# Patient Record
Sex: FEMALE | Race: WHITE
[De-identification: names, ages, dates, MRNs, and addresses within clinical notes are randomized per-mention and may not be internally consistent; named-entity substitution may affect disease eponyms.]

---

## 2017-03-27 ENCOUNTER — HOSPITAL ENCOUNTER (INPATIENT)
Dept: HOSPITAL 74 - FER | Age: 62
LOS: 10 days | Discharge: HOME | DRG: 872 | End: 2017-04-06
Attending: NURSE PRACTITIONER | Admitting: INTERNAL MEDICINE
Payer: COMMERCIAL

## 2017-03-27 VITALS — BODY MASS INDEX: 26.2 KG/M2

## 2017-03-27 DIAGNOSIS — R63.1: ICD-10-CM

## 2017-03-27 DIAGNOSIS — E86.0: ICD-10-CM

## 2017-03-27 DIAGNOSIS — G43.809: ICD-10-CM

## 2017-03-27 DIAGNOSIS — E83.39: ICD-10-CM

## 2017-03-27 DIAGNOSIS — D69.6: ICD-10-CM

## 2017-03-27 DIAGNOSIS — E11.65: ICD-10-CM

## 2017-03-27 DIAGNOSIS — R65.20: ICD-10-CM

## 2017-03-27 DIAGNOSIS — B96.29: ICD-10-CM

## 2017-03-27 DIAGNOSIS — N39.0: ICD-10-CM

## 2017-03-27 DIAGNOSIS — J40: ICD-10-CM

## 2017-03-27 DIAGNOSIS — A41.59: Primary | ICD-10-CM

## 2017-03-27 DIAGNOSIS — E83.42: ICD-10-CM

## 2017-03-27 DIAGNOSIS — R35.8: ICD-10-CM

## 2017-03-27 DIAGNOSIS — K59.09: ICD-10-CM

## 2017-03-27 DIAGNOSIS — C20: ICD-10-CM

## 2017-03-27 DIAGNOSIS — N17.9: ICD-10-CM

## 2017-03-27 DIAGNOSIS — E87.1: ICD-10-CM

## 2017-03-27 DIAGNOSIS — K21.9: ICD-10-CM

## 2017-03-27 LAB
ALBUMIN SERPL-MCNC: 2.7 G/DL (ref 3.5–5)
ALP SERPL-CCNC: 168 U/L (ref 32–92)
ALT SERPL-CCNC: 28 U/L (ref 10–40)
ANION GAP SERPL CALC-SCNC: 10 MMOL/L (ref 8–16)
AST SERPL-CCNC: 53 U/L (ref 10–42)
BACTERIA #/AREA URNS HPF: (no result) /HPF
BILIRUB SERPL-MCNC: 0.8 MG/DL (ref 0.2–1)
CALCIUM SERPL-MCNC: 8.7 MG/DL (ref 8.4–10.2)
CK SERPL-CCNC: 19 IU/L (ref 26–140)
CO2 SERPL-SCNC: 18 MMOL/L (ref 22–28)
COLOR UR: YELLOW
CREAT SERPL-MCNC: 2.3 MG/DL (ref 0.6–1.3)
DEPRECATED RDW RBC AUTO: 14.5 % (ref 11.6–15.6)
GLUCOSE SERPL-MCNC: 270 MG/DL (ref 74–106)
LEUKOCYTE ESTERASE UR QL STRIP.AUTO: (no result)
MAGNESIUM SERPL-MCNC: 1.8 MG/DL (ref 1.8–2.4)
MCH RBC QN AUTO: 30.4 PG (ref 25.7–33.7)
MCHC RBC AUTO-ENTMCNC: 33.5 G/DL (ref 32–36)
MCV RBC: 90.6 FL (ref 80–96)
NEUTROPHILS # BLD: 77 % (ref 42.8–82.8)
PH UR: 7 [PH] (ref 4.5–8)
PHOSPHATE SERPL-MCNC: 2.9 MG/DL (ref 2.5–4.6)
PLATELET # BLD AUTO: 42 K/MM3 (ref 134–434)
PLATELET # BLD EST: (no result) 10*3/UL
PLATELET COMMENT2: (no result)
PLATELET COMMENT3: (no result)
PMV BLD: 9.4 FL (ref 7.5–11.1)
PROT SERPL-MCNC: 6.5 G/DL (ref 6.4–8.3)
PROT UR QL STRIP: (no result)
RBC # BLD AUTO: (no result) /HPF (ref 0–3)
RBC # UR STRIP: (no result) /UL
SP GR UR: 1.01 (ref 1–1.02)
TROPONIN I SERPL-MCNC: < 0.03 NG/ML (ref 0.03–0.5)
UROBILINOGEN UR STRIP-MCNC: (no result) MG/DL (ref 0.2–1)
WBC # BLD AUTO: 17.6 K/MM3 (ref 4–10)
WBC # UR AUTO: (no result) /UL (ref 3–5)

## 2017-03-27 RX ADMIN — POLYETHYLENE GLYCOL 3350 SCH GRAMS: 17 POWDER, FOR SOLUTION ORAL at 23:16

## 2017-03-27 NOTE — HP
CHIEF COMPLAINT: polydipsia, polyuria, fevers





PCP: Dr. Mora





HISTORY OF PRESENT ILLNESS:


62 y/o F w/PMH of GERD, prediabetes, rectal carcinoid ca, migraines presents to 

ER w/ c/o polydipsia, polyuria, intermittent fevers for last 1 month.  She also 

c/o intermittent chills, non-productive cough from allergies, and 

intermittently has night sweats.  She saw her PCP one week ago who hydrated her 

with 2 L NS in the office. She also c/o some heaviness in breathing and 

weakness today.  She denies any sick contacts, any recent travel. She denies CP

, SOB, abd pain, N/V, diarrhea.  She was diagnosed with pre-diabetes last 

summer and was trying to work on it with lifestyle modifications.  She had 

recently seen neuro and diagnosed with brachial plexus inflammation and was 

started on prednisone 10mg bid for 7 days. Today is day 4/7 of prednisone.   

She has also c/o darker urine and change in smell of urine over last few days.  

She denies any dysuria.  She has not had a normal BM in 1 week, she feels 

constipated.





Sprinolactone listed as home med but she uses lasix instead but is unsure of 

dose.








ER course was notable for:


(1) vanc, zosyn, levaquin, cxr


(2)


(3)





Recent Travel: denies





PAST MEDICAL HISTORY: GERD, prediabetes, rectal carcinoid ca, migraines





PAST SURGICAL HISTORY: knee surgery





Social History:


Smoking: denies


Alcohol: denies


Drugs: denies





Family History: Mother: CLL.   Father: "heart issues" but currently alive at 

age 95


Allergies





everolimus [From Afinitor] Adverse Reaction (Severe, Verified 03/27/17 11:58)


 SOB, Myalgias, Arthralgias,Edema


erythromycin base [Erythromycin Base] Adverse Reaction (Verified 03/27/17 11:58)


 


 NAUSEA 








HOME MEDICATIONS:


 Home Medications











 Medication  Instructions  Recorded


 


  


 


Montelukast Na [Singulair -] 10 mg PO HS 03/27/17


 


Octreotide Acetate,Mi-Spheres 30 mg IM MONTHLY 03/27/17





[Sandostatin Lar Depot]  


 


Prednisone [Deltasone] 20 mg PO DAILY 03/27/17


 


Topiramate [Topamax] 50 mg PO DAILY 03/27/17








REVIEW OF SYSTEMS


CONSTITUTIONAL: 


fever, chills, night sweats, weakness


Absent: malaise, loss of appetite, weight change


HEENT: 


Absent:  rhinorrhea, nasal congestion, throat pain, throat swelling, difficulty 

swallowing, mouth swelling, ear pain, eye pain, visual changes


CARDIOVASCULAR: 


Absent: chest pain, syncope, palpitations, irregular heart rate, lightheadedness

, peripheral edema


RESPIRATORY: 


cough


Absent: shortness of breath, dyspnea with exertion, orthopnea, wheezing, stridor

, hemoptysis


GASTROINTESTINAL:


constipation


Absent: abdominal pain, abdominal distension, nausea, vomiting, diarrhea, melena

, hematochezia


GENITOURINARY: 


Absent: dysuria, frequency, urgency, hesitancy, hematuria, flank pain, genital 

pain


MUSCULOSKELETAL: 


Absent: myalgia, arthralgia, joint swelling, back pain, neck pain


SKIN: 


Absent: rash, itching, pallor


HEMATOLOGIC/IMMUNOLOGIC: 


Absent: easy bleeding, easy bruising, lymphadenopathy, frequent infections


ENDOCRINE:


Absent: unexplained weight gain, unexplained weight loss, heat intolerance, 

cold intolerance


NEUROLOGIC: 


Absent: headache, focal weakness or paresthesias, dizziness, unsteady gait, 

seizure, mental status changes, bladder or bowel incontinence


PSYCHIATRIC: 


Absent: anxiety, depression, suicidal or homicidal ideation, hallucinations.








PHYSICAL EXAMINATION


 Vital Signs - 24 hr











  03/27/17 03/27/17 03/27/17





  15:45 15:50 16:24


 


Temperature 100.8 F H 101.8 F H 


 


Pulse Rate [  108 H 142 H





Apical]   


 


Respiratory  30 H 34 H





Rate   


 


Blood Pressure  139/71 151/89





[Right Arm]   


 


O2 Sat by Pulse  96 99





Oximetry (%)   














  03/27/17 03/27/17





  16:55 18:16


 


Temperature 102.1 F H 102.7 F H


 


Pulse Rate [ 134 H 





Apical]  


 


Respiratory 20 





Rate  


 


Blood Pressure  





[Right Arm]  


 


O2 Sat by Pulse 99 





Oximetry (%)  











GENERAL: Awake, alert, and fully oriented, in no acute distress.


HEAD: Normal with no signs of trauma.


EYES: extraocular movements intact, sclera anicteric, conjunctiva clear. No lid 

lag.


EARS, NOSE, THROAT: Ears normal, nares patent, Moist mucous membranes.


NECK: Normal range of motion, supple without lymphadenopathy, JVD, or masses.


LUNGS: Breath sounds equal, clear to auscultation bilaterally. No wheezes, and 

no crackles. No accessory muscle use.


HEART: Tachycardic, normal S1 and S2 without murmur, rub or gallop.


ABDOMEN: Soft, mild LLQ tenderness, not distended, normoactive bowel sounds, no 

guarding, no rebound, no masses.  No hepatomegaly or  splenomegaly. 


MUSCULOSKELETAL: No CVA tenderness.


LOWER EXTREMITIES: 2+ pulses, warm, well-perfused. No calf tenderness. Trace 

pitting edema.


NEUROLOGICAL: Normal speech. Gait not observed.


PSYCHIATRIC: Cooperative. Good eye contact. Appropriate mood and affect.


SKIN: Warm, dry, normal turgor, no rashes or lesions noted, normal capillary 

refill. 








 Laboratory Results - last 24 hr








 CBCD











WBC  17.6 K/mm3 (4.0-10.0)  H D 03/27/17  12:30    


 


RBC  3.60 M/mm3 (3.60-5.2)   03/27/17  12:30    


 


Hgb  10.9 GM/dl (10.7-15.3)  D 03/27/17  12:30    


 


Hct  32.6 % (32.4-45.2)   03/27/17  12:30    


 


MCV  90.6 fl (80-96)   03/27/17  12:30    


 


MCHC  33.5 g/dl (32.0-36.0)   03/27/17  12:30    


 


RDW  14.5 % (11.6-15.6)  D 03/27/17  12:30    


 


Plt Count  42 K/MM3 (134-434)  L* D 03/27/17  12:30    


 


MPV  9.4 fl (7.5-11.1)  D 03/27/17  12:30    








 CMP











Sodium  128 mmol/L (136-145)  L D 03/27/17  12:30    


 


Potassium  4.2 mmol/L (3.5-5.1)   03/27/17  12:30    


 


Chloride  100 mmol/L ()  D 03/27/17  12:30    


 


Carbon Dioxide  18 mmol/L (22-28)  L D 03/27/17  12:30    


 


Anion Gap  10  (8-16)   03/27/17  12:30    


 


BUN  53 mg/dl (7-18)  H D 03/27/17  12:30    


 


Creatinine  2.3 mg/dl (0.6-1.3)  H D 03/27/17  12:30    


 


Creat Clearance w eGFR  21.56  (>60)   03/27/17  12:30    


 


Random Glucose  270 mg/dl ()  H D 03/27/17  12:30    


 


Calcium  8.7 mg/dl (8.4-10.2)   03/27/17  12:30    


 


Total Bilirubin  0.8 mg/dl (0.2-1.0)  D 03/27/17  12:30    


 


AST  53 U/L (10-42)  H D 03/27/17  12:30    


 


ALT  28 U/L (10-40)  D 03/27/17  12:30    


 


Alkaline Phosphatase  168 U/L (32-92)  H D 03/27/17  12:30    


 


Total Protein  6.5 g/dl (6.4-8.3)   03/27/17  12:30    


 


Albumin  2.7 g/dl (3.5-5.0)  L D 03/27/17  12:30    








 CARDIAC ENZYMES











Creatine Kinase  19 IU/L ()  L  03/27/17  12:30    


 


Troponin I  < 0.03 ng/ml (0.03-0.50)  L  03/27/17  12:30    




















  03/27/17





  16:10


 


Lactic Acid  5.594 H*








 Laboratory Tests











  03/27/17





  21:00


 


Lactic Acid  2.093 H*








 Urine Test Results











Urine Color  Yellow   03/27/17  12:05    


 


Urine Appearance  Slightly   03/27/17  12:05    


 


Urine pH  7.0  (4.5-8)   03/27/17  12:05    


 


Ur Specific Gravity  1.010  (1.005-1.025)   03/27/17  12:05    


 


Urine Protein  1+  (NEGATIVE)  H  03/27/17  12:05    


 


Urine Glucose (UA)  Negative  (NEGATIVE)   03/27/17  12:05    


 


Urine Ketones  Negative  (NEGATIVE)   03/27/17  12:05    


 


Urine Blood  3+  (NEGATIVE)  H  03/27/17  12:05    


 


Urine Nitrite  Negative  (NEGATIVE)   03/27/17  12:05    


 


Urine Bilirubin  Negative  (NEGATIVE)   03/27/17  12:05    


 


Ur Leukocyte Esterase  3+  (NEGATIVE)  H  03/27/17  12:05    


 


Urine RBC  5-10 /hpf (0-3)   03/27/17  12:05    


 


Urine WBC  30-50  (3-5)   03/27/17  12:05    


 


Ur Epithelial Cells  3-5 /HPF  03/27/17  12:05    


 


Urine Bacteria  Moderate /hpf (NEGATIVE)   03/27/17  12:05    











Imaging:


CXR: no evidence of active pulm disease








ASSESSMENT/PLAN:


62 y/o F w/PMH of rectal ca, GERD, migraines, pre-diabetes presented to ER w/ c/

o polydipsia, polyuria, and intermittent fevers for 4 weeks. Admitted for 

severe sepsis secondary to UTI.





-Severe sepsis secondary to UTI


   -SIRS 4/4, UA 3+ LE


   -c/w zosyn, ID consulted (Bobde)


   -NS @ 100 ml/hr, bolused intially with 250ml/hr x 2 L after being brought up 

to floors.


   -trend LA,  5.594 -> 2


   -f/u BCx, UCx


   -Tylenol 650 mg PO q6h PRN for fever, monitor LFTs


   -part of leukocytosis may be from recent prednisone use





-ADAM secondary to severe sepsis


   -BUN/Cr 53/2.3, baseline Cr 0.8


   -NS @ 125 ml/hr


   -monitor BUN/Cr


   -urine lytes to calculate FeNa





-Thrombocytopenia secondary to severe sepsis


   -monitor, no signs of active bleeding at this time





-Abd pain secondary to constipation


   -Miralax, colace





-Hyperglycemia, polydipsia, polyuria


   -f/u A1C, BGM, ISS BIDAC


   -recent prednisone use





-Brachial plexus inflammation


   -today is day 4/7 of prednisone, will taper off prednisone


   -20 mg today, 10 tomorrow, 10 the day after for final dose.





-Rectal Ca, carcinoid


   -octreotide monthly, follows up with specialist as outpatient





-migraines


   -c/w topamax





-DVT ppx


   -SCDs





-FEN


   -NS @ 100 ml/hr


   -Hyponatremia - c/w NS @ 100 ml/hr, monitor, do not increase by more than 10 

in 24 hours


   -Diabetic diet





-Dispo:


   -Admit to m/s





Problem List





- Problem


(1) Acute kidney injury


Code(s): N17.9 - ACUTE KIDNEY FAILURE, UNSPECIFIED





(2) Diabetes mellitus, new onset


Code(s): E11.9 - TYPE 2 DIABETES MELLITUS WITHOUT COMPLICATIONS





(3) Sepsis


Code(s): A41.9 - SEPSIS, UNSPECIFIED ORGANISM   





(4) UTI (urinary tract infection)


Code(s): N39.0 - URINARY TRACT INFECTION, SITE NOT SPECIFIED   





(5) Migraines


Code(s): G43.909 - MIGRAINE, UNSP, NOT INTRACTABLE, WITHOUT STATUS MIGRAINOSUS 

  








Visit type





- Emergency Visit


Emergency Visit: Yes


ED Registration Date: 03/27/17


Care time: The patient presented to the Emergency Department on the above date 

and was hospitalized for further evaluation of their emergent condition.





- New Patient


This patient is new to me today: Yes


Date on this admission: 03/28/17





- Critical Care


Critical Care patient: No

## 2017-03-27 NOTE — PN
<Humble Shirleybrandee - Last Filed: 03/27/17 19:22>





Teaching Attending Note


Name of Resident: Cornelio Kaur





ATTENDING PHYSICIAN STATEMENT





I saw and evaluated the patient.


I reviewed the resident's note and discussed the case with the resident.


I agree with the resident's findings and plan as documented.








SUBJECTIVE:








OBJECTIVE:








ASSESSMENT AND PLAN:








<Js Moraleske - Last Filed: 03/27/17 21:36>





Teaching Attending Note


ATTENDING PHYSICIAN STATEMENT





I saw and evaluated the patient.


I reviewed the resident's note and discussed the case with the resident.


I agree with the resident's findings and plan as documented.





SUBJECTIVE:


61 year old female with a past medical history of rectal cancer, gerd, pre-

diabetes, and migraines  presents with excessive thirst and urination for four 

weeks. The patient noticed associated night sweats and intermittent fevers. 

Patient stated that she recently started a regimen of prednisone for a 

bacterial plexus inflammation. Patient denies shortness of breath and chest pain





OBJECTIVE:


Vital Signs:


 Last Vital Signs











Temp Pulse Resp BP Pulse Ox


 


 102.7 F H  134 H  20   151/89   99 


 


 03/27/17 18:16  03/27/17 16:55  03/27/17 16:55  03/27/17 16:24  03/27/17 16:55











Physical Exam:


GEN: NAD 


HEENT: NCAT, PERRL


CARD: RRR, S1 S2


RESP: CTAB


ABD: NT, BWS x4


EXT: - CCE





Labs:


 CBCD











WBC  17.6 K/mm3 (4.0-10.0)  H D 03/27/17  12:30    


 


RBC  3.60 M/mm3 (3.60-5.2)   03/27/17  12:30    


 


Hgb  10.9 GM/dl (10.7-15.3)  D 03/27/17  12:30    


 


Hct  32.6 % (32.4-45.2)   03/27/17  12:30    


 


MCV  90.6 fl (80-96)   03/27/17  12:30    


 


MCHC  33.5 g/dl (32.0-36.0)   03/27/17  12:30    


 


RDW  14.5 % (11.6-15.6)  D 03/27/17  12:30    


 


Plt Count  42 K/MM3 (134-434)  L* D 03/27/17  12:30    


 


MPV  9.4 fl (7.5-11.1)  D 03/27/17  12:30    








 CMP











Sodium  128 mmol/L (136-145)  L D 03/27/17  12:30    


 


Potassium  4.2 mmol/L (3.5-5.1)   03/27/17  12:30    


 


Chloride  100 mmol/L ()  D 03/27/17  12:30    


 


Carbon Dioxide  18 mmol/L (22-28)  L D 03/27/17  12:30    


 


Anion Gap  10  (8-16)   03/27/17  12:30    


 


BUN  53 mg/dl (7-18)  H D 03/27/17  12:30    


 


Creatinine  2.3 mg/dl (0.6-1.3)  H D 03/27/17  12:30    


 


Creat Clearance w eGFR  21.56  (>60)   03/27/17  12:30    


 


Calcium  8.7 mg/dl (8.4-10.2)   03/27/17  12:30    


 


Total Bilirubin  0.8 mg/dl (0.2-1.0)  D 03/27/17  12:30    


 


AST  53 U/L (10-42)  H D 03/27/17  12:30    


 


ALT  28 U/L (10-40)  D 03/27/17  12:30    


 


Alkaline Phosphatase  168 U/L (32-92)  H D 03/27/17  12:30    


 


Total Protein  6.5 g/dl (6.4-8.3)   03/27/17  12:30    


 


Albumin  2.7 g/dl (3.5-5.0)  L D 03/27/17  12:30    











Imaging:


EXAM#: CHEST X-RAY 


History: Cough and fever. 


Comparison study: May 13, 2015 


Findings: Patient slightly rotated toward the left side. The trachea is normal 

in size. Unremarkable contour of the cardiomediastinal silhouette. The lungs 

are well aerated. No evidence of airspace opacities, atelectasis,  pleural 

effusion, or pneumothorax. No bulky hilar adenopathy is noted. No lung mass is 

seen within the limitation of examination Demineralized osseous structures. 

Mild dextroscoliosis is seen at  the thoracolumbar junction region. Loss of 

vertical height of T12 unchanged from 2015. Intact visualized osseous rectal 

cancer, gerd, pre-diabetes, and migraine structures Impression: No evidence of 

active pulmonary disease. Reported By: Willian Nguyen MD 03/27/17 [id number]    





ASSESSMENT AND PLAN:


61 year old female with a past medical history of rectal cancer, gerd, pre-

diabetes, and migraines who presents with polyuria, polydipsia, and 

intermittent fever, being admitted for sepsis secondary to UTI





1. Sepsis- secondary to UTI


-Continue Zosyn


-Blood Cultures 


-Urine Cultures


-Repeat Lactic Acid


-IVF


-ID consult


-Acetaminophen PRN fever 





2. Acute renal failure


-Urine lytes


-Trend creatinine 


-IVF





3. Hiponatremia-Most likely due to hypovolemia


-Avoid inc sodium >10 in 24 hr





4. Thrombocytopenia- most likely due to sepsis


-Trend 





5. Rectal cancer


-Carcinoid 


-On Octreotide monthly 





6. Migraine


-Continue Topamax 





7. Polyuria/Polydipsia  


Check A1C


Insulin Sliding scale 


Possibly due to prednisone use.





8 Brachial Plexus Inflammation 


-On day 4 out 7 of prednisone 20 mg 


-Since short term course will hold off on stress course steroids





9. DVT PPX -low risk


-SCDs





Admit to med surg. 


Documentation prepared by Alejandro Morales, acting as medical scribe for Dr. Greer Shirley MD.

## 2017-03-27 NOTE — PDOC
History of Present Illness





- General


Chief Complaint: Respiratory


Stated Complaint: FLU LIKE ILLNESS X 4 WEEKS


Time Seen by Provider: 03/27/17 12:06


History Source: Patient, Old Records


Exam Limitations: No Limitations





- History of Present Illness


Initial Comments: 


03/27/17 12:10


62 y/o female with h/o rectal CA, GERD and migraine headaches presents to the 

ED with c/o excessive thirst and urination and feeling dehydrated.  The patient 

states that these symptoms have been present for about 4 weeks.  She has also 

had intermittent fevers, chills and coughing (non-productive), as well as night 

sweats.  She denies chest pain, SOB, abdominal pain, nausea, vomiting and 

diarrhea.  She has no prior h/o DM but says that she was told that she has "pre-

diabetes."  The patient states that she recently started a regimen of 

prednisone for a brachial plexus inflammation.








Past History





- Past Medical History


Allergies/Adverse Reactions: 


 Allergies











Allergy/AdvReac Type Severity Reaction Status Date / Time


 


everolimus [From Afinitor] AdvReac Severe SOB, Verified 03/27/17 11:58





   Myalgias,  





   Arthralgias,Edema  


 


erythromycin base AdvReac   Verified 03/27/17 11:58





[Erythromycin Base]     











Home Medications: 


Ambulatory Orders





Spironolactone [Aldactone] 25 mg PO ASDIR PRN 05/13/15 


Montelukast Na [Singulair -] 10 mg PO HS 03/27/17 


Octreotide Acetate,Mi-Spheres [Sandostatin Lar Depot] 30 mg IM MONTHLY 03/27/17 


Prednisone [Deltasone] 20 mg PO DAILY 03/27/17 


Topiramate [Topamax] 50 mg PO DAILY 03/27/17 








Cancer: Yes (RECTAL)


GI Disorders: Yes (GERD)





- Psycho/Social/Smoking Cessation Hx


Anxiety: No


Suicidal Ideation: No


Smoking History: Never smoked


Have you smoked in the past 12 months: No


Hx Alcohol Use: Yes (SOCIAL)


Drug/Substance Use Hx: No


Substance Use Type: None





**Review of Systems





- Review of Systems


Able to Perform ROS?: Yes


Is the patient limited English proficient: No


Constitutional: Yes: See HPI


HEENTM: Yes: See HPI


Respiratory: Yes: See HPI


Cardiac (ROS): No: Symptoms Reported


ABD/GI: No: Symptoms Reported


: Yes: See HPI, Frequency.  No: Burning, Dysuria, Discharge, Hematuria


Musculoskeletal: Yes: Joint Pain


Integumentary: No: Symptoms Reported


Neurological: No: Symptoms reported





*Physical Exam





- Vital Signs


 Last Vital Signs











Temp Pulse Resp BP Pulse Ox


 


 97.6 F   100 H  16   104/62   100 


 


 03/27/17 11:56  03/27/17 11:56  03/27/17 11:56  03/27/17 11:56  03/27/17 11:56














- Physical Exam


Comments: 





03/27/17 12:13


GENERAL:


Well developed, well nourished. Awake and alert. No acute distress.


HEENT:


Normocephalic, atraumatic. PERRLA, EOMI. No conjunctival pallor. Sclera are non-

icteric. Dry oral mucosa. Oropharynx is clear.


NECK: 


Supple. Full ROM. No JVD.  No lymphadenopathy.


CARDIOVASCULAR:


Regular rate and rhythm. No murmurs, rubs, or gallops. Distal pulses are 2+ and 

symmetric. 


PULMONARY: 


No evidence of respiratory distress. Lungs clear to auscultation bilaterally. 

No wheezing, rales or rhonchi.


ABDOMINAL:


Soft. Non-tender. Non-distended. No rebound or guarding. No organomegaly. 

Normoactive bowel sounds. 


MUSCULOSKELETAL 


Normal range of motion at all joints. No bony deformities or tenderness. No CVA 

tenderness.


EXTREMITIES: 


No cyanosis. No clubbing. No edema. No calf tenderness.


SKIN: 


Warm and dry. Normal capillary refill. No rashes. No jaundice. 


NEUROLOGICAL: 


Alert, awake, appropriate. Cranial nerves 2-12 intact. Grossly non-focal exam.


PSYCHIATRIC: 


Cooperative. Good eye contact. Appropriate mood and affect.





ED Treatment Course





- LABORATORY


CBC & Chemistry Diagram: 


 03/27/17 12:30





 03/27/17 12:30





Medical Decision Making





- Medical Decision Making


03/27/17 12:14


62 y/o female with rectal CA, migraines and pre-diabetes who presents to the ED 

with c/o excessive thirst and urination x 4 weeks and intermittent cough and 

fevers/chills.  DDx includes but is not loimited to:  new onset diabetes, DKA, 

electrolyte abnormality, dehydration, toxic/metabolic derangement, infection (

PNA, influenza, UTI), ACS .


Plan:


1. Labs


2. Urine analysis


3. EKG


4. CXR


5. IVF for hydration


6. Observe and re-evaluate





03/27/17 13:27


Addendum:  All labs were reviewed and are noted in the EMR.  The WBC is 17.4k 

with 18% bandemia, BUN/Cr are markedly elevated from baseline and the glucose 

is 270 (AG is 10).  The urine also shows +3 LE.  Will obtain blood and urine 

cultures as well as lactate.  Continue IVF hydration, IV abx for UTI/urosepsis 

and will admit to medicine service for IVF hydration and work-up of ADAM, UTI, 

sepsis.














*DC/Admit/Observation/Transfer


Diagnosis at time of Disposition: 


 Acute kidney injury, UTI (urinary tract infection), Diabetes mellitus, new 

onset, Sepsis





- Discharge Dispostion


Condition at time of disposition: Stable


Admit: Yes





- Referrals


Referrals: 


Jorge Taylor MD [Primary Care Provider] -

## 2017-03-28 LAB
ALBUMIN SERPL-MCNC: 2 G/DL (ref 3.4–5)
ALP SERPL-CCNC: 180 U/L (ref 45–117)
ALT SERPL-CCNC: 29 U/L (ref 12–78)
ANION GAP SERPL CALC-SCNC: 11 MMOL/L (ref 8–16)
AST SERPL-CCNC: 24 U/L (ref 15–37)
BASOPHILS # BLD: 0 % (ref 0–2)
BILIRUB SERPL-MCNC: 0.6 MG/DL (ref 0.2–1)
CALCIUM SERPL-MCNC: 7.7 MG/DL (ref 8.5–10.1)
CHLORIDE,RANDOM URINE: 31 MMOL/L
CO2 SERPL-SCNC: 17 MMOL/L (ref 21–32)
CREAT SERPL-MCNC: 1.7 MG/DL (ref 0.55–1.02)
DEPRECATED RDW RBC AUTO: 15.6 % (ref 11.6–15.6)
EOSINOPHIL # BLD: 1.6 % (ref 0–4.5)
GLUCOSE SERPL-MCNC: 181 MG/DL (ref 74–106)
MCH RBC QN AUTO: 30.1 PG (ref 25.7–33.7)
MCHC RBC AUTO-ENTMCNC: 32.7 G/DL (ref 32–36)
MCV RBC: 92.3 FL (ref 80–96)
NEUTROPHILS # BLD: 92.8 % (ref 42.8–82.8)
PLATELET # BLD AUTO: 37 K/MM3 (ref 134–434)
PMV BLD: 9.6 FL (ref 7.5–11.1)
PROT SERPL-MCNC: 5.6 G/DL (ref 6.4–8.2)
SODIUM UR-SCNC: 36 MMOL/L
WBC # BLD AUTO: 14.8 K/MM3 (ref 4–10)

## 2017-03-28 RX ADMIN — MEROPENEM SCH MLS/HR: 1 INJECTION, POWDER, FOR SOLUTION INTRAVENOUS at 18:33

## 2017-03-28 RX ADMIN — TOPIRAMATE SCH MG: 25 TABLET, FILM COATED ORAL at 10:10

## 2017-03-28 RX ADMIN — SODIUM CHLORIDE SCH MLS/HR: 9 INJECTION, SOLUTION INTRAVENOUS at 17:23

## 2017-03-28 RX ADMIN — SALINE NASAL SPRAY PRN SPRAYS: 1.5 SOLUTION NASAL at 10:13

## 2017-03-28 RX ADMIN — MONTELUKAST SODIUM SCH MG: 10 TABLET, COATED ORAL at 21:38

## 2017-03-28 RX ADMIN — INSULIN ASPART SCH UNITS: 100 INJECTION, SOLUTION INTRAVENOUS; SUBCUTANEOUS at 17:22

## 2017-03-28 RX ADMIN — POLYETHYLENE GLYCOL 3350 SCH: 17 POWDER, FOR SOLUTION ORAL at 10:10

## 2017-03-28 RX ADMIN — SALINE NASAL SPRAY PRN SPRAYS: 1.5 SOLUTION NASAL at 06:35

## 2017-03-28 RX ADMIN — SALINE NASAL SPRAY PRN SPRAYS: 1.5 SOLUTION NASAL at 21:39

## 2017-03-28 RX ADMIN — POLYETHYLENE GLYCOL 3350 SCH GRAMS: 17 POWDER, FOR SOLUTION ORAL at 22:00

## 2017-03-28 RX ADMIN — ALBUTEROL SULFATE SCH AMP: 2.5 SOLUTION RESPIRATORY (INHALATION) at 17:13

## 2017-03-28 RX ADMIN — INSULIN ASPART SCH UNITS: 100 INJECTION, SOLUTION INTRAVENOUS; SUBCUTANEOUS at 06:35

## 2017-03-28 RX ADMIN — ALBUTEROL SULFATE SCH AMP: 2.5 SOLUTION RESPIRATORY (INHALATION) at 23:05

## 2017-03-28 RX ADMIN — SODIUM CHLORIDE SCH MLS/HR: 9 INJECTION, SOLUTION INTRAVENOUS at 05:15

## 2017-03-28 NOTE — PN
Physical Exam: 


SUBJECTIVE: Patient seen and examined. She says her body feels sore. She ate 

breakfast today for first time. She denies urinary hesitancy and dysuria. She 

did note to being thirsty. 





Events: 


- No fevers overnight to this AM 





OBJECTIVE:





 Vital Signs











 Period  Temp  Pulse  Resp  BP Sys/Bethea  Pulse Ox


 


 Last 24 Hr  97.6 F-102.7 F    20-34  /48-89  90-99





PE


Neuro: alert, awake, cn 2-12intact


Pulm: bibasilar crackles + nc


CV: s1 s2 rrr no mrg


Abd: mild abd tenderness to palpation, s + bs


Ext: warm, no le edema 


 CBCD











WBC  14.8 K/mm3 (4.0-10.0)  H  03/28/17  06:55    


 


RBC  3.17 M/mm3 (3.60-5.2)  L  03/28/17  06:55    


 


Hgb  9.6 GM/dL (10.7-15.3)  L  03/28/17  06:55    


 


Hct  29.3 % (32.4-45.2)  L  03/28/17  06:55    


 


MCV  92.3 fl (80-96)   03/28/17  06:55    


 


MCHC  32.7 g/dl (32.0-36.0)   03/28/17  06:55    


 


RDW  15.6 % (11.6-15.6)   03/28/17  06:55    


 


Plt Count  37 K/MM3 (134-434)  L  03/28/17  06:55    


 


MPV  9.6 fl (7.5-11.1)   03/28/17  06:55    








 CMP











Sodium  143 mmol/L (136-145)   03/28/17  06:55    


 


Potassium  4.2 mmol/L (3.5-5.1)   03/28/17  06:55    


 


Chloride  115 mmol/L ()  H  03/28/17  06:55    


 


Carbon Dioxide  17 mmol/L (21-32)  L  03/28/17  06:55    


 


Anion Gap  11  (8-16)   03/28/17  06:55    


 


BUN  33 mg/dL (7-18)  H  03/28/17  06:55    


 


Creatinine  1.7 mg/dL (0.55-1.02)  H  03/28/17  06:55    


 


Creat Clearance w eGFR  30.56  (>60)   03/28/17  06:55    


 


Calcium  7.7 mg/dL (8.5-10.1)  L  03/28/17  06:55    


 


Total Bilirubin  0.6 mg/dL (0.2-1.0)   03/28/17  06:55    


 


AST  24 U/L (15-37)   03/28/17  06:55    


 


ALT  29 U/L (12-78)   03/28/17  06:55    


 


Alkaline Phosphatase  180 U/L ()  H  03/28/17  06:55    


 


Total Protein  5.6 g/dl (6.4-8.2)  L  03/28/17  06:55    


 


Albumin  2.0 g/dl (3.4-5.0)  L  03/28/17  06:55    














  03/27/17 03/27/17 03/27/17





  12:30 12:30 21:00


 


Hemoglobin A1c %    Pending


 


Lactic Acid   


 


Phosphorus  2.9  


 


Magnesium  1.8  


 


Creatine Kinase   19 L 


 


Troponin I   < 0.03 L 














  03/27/17 03/28/17





  21:00 06:55


 


Hemoglobin A1c %  


 


Lactic Acid  2.093 H*  1.083


 


Phosphorus  


 


Magnesium  


 


Creatine Kinase  


 


Troponin I  








Active Medications











Generic Name Dose Route Start Last Admin





  Trade Name Freq  PRN Reason Stop Dose Admin


 


Acetaminophen  650 mg 03/27/17 21:00  





  Tylenol -  PO   





  Q6H PRN   





  FEVER OR PAIN   


 


Docusate Sodium  100 mg 03/27/17 22:02  





  Colace -  PO   





  BID PRN   





  CONSTIPATION   


 


Sodium Chloride  1,000 mls @ 100 mls/hr 03/28/17 05:15 03/28/17 05:15





  Normal Saline -  IV   100 mls/hr





  ASDIR RAJ   Administration


 


Meropenem 1 gm/ Dextrose  100 mls @ 100 mls/hr 03/28/17 12:00  





  IVPB   





  Q8H-IV RAJ   





  Protocol   


 


Insulin Aspart  1 vial 03/28/17 07:00 03/28/17 06:35





  Novolog Vial Sliding Scale -  SQ   2 units





  BIDAC RAJ   Administration





  Protocol   


 


Montelukast Sodium  10 mg 03/28/17 22:00  





  Singulair -  PO   





  HS RAJ   


 


Polyethylene Glycol  17 gm 03/27/17 22:03 03/28/17 10:10





  Miralax (For Daily Use) -  PO   Not Given





  DAILY RAJ   


 


Prednisone  10 mg 03/28/17 14:00  





  Deltasone -  PO 03/28/17 14:01  





  ONCE ONE   


 


Prednisone  10 mg 03/29/17 14:00  





  Deltasone -  PO 03/29/17 14:01  





  ONCE ONE   


 


Sodium Chloride  2 spray 03/28/17 05:08 03/28/17 10:13





  [place] Spray Nasal Spray -  NS   2 sprays





  TID PRN   Administration





  NASAL CONGESTION   


 


Topiramate  50 mg 03/28/17 10:00 03/28/17 10:10





  Topamax -  PO   50 mg





  DAILY RAJ   Administration








 Microbiology











 03/27/17 13:25 Urine Culture - Preliminary





 Urine - Urine Clean Catch    Non Lactose Fermenting Gnb


 


 03/27/17 13:25 Blood Culture - Preliminary





 Blood - Peripheral Venous    Pending Organism


 


 03/27/17 13:30 Blood Culture - Preliminary





 Blood - Peripheral Venous    Pending Organism


 


 03/27/17 12:25 Influenza Types A,B Antigen (TRICIA) - Final





 Nasopharyngeal Swab  - Final











Assessment: 61 year old female with PMHx of GERD, prediabetes, rectal carcinoid 

ca, migraines admitted with polydipsia, polyuria, intermittent fevers/chills 

for 4 weeks, treated with tamiflu. 





Plan: 





1. Severe sepsis likely gram negative bacteremia and ESBL UTI 


- Start Meropenem 1gm q8 


- Lactic acid wnl 


- Continue IVF 


- Repeat BC tomorrow


- ID aware 





2. UTI 


- Follow cultures


- Abx as above 





3. Hyperlgycemia/?DM II


- Hgb a1c pending 


- ISS, BGM ACHS 


- On short course of prednisone per neuro 





4. Hyponatrmia


- Likely dehydration from polyuria from uncontrolled DM, and sepsis 


- Now resolved


- No acute mental changes 





5. ADAM 


- FENa 1.11, indicative of ATN, however may need to repeat urine electrolytes 

as pt states she takes lasix/aldacton prn 


- Will ask if takes NSAIDs 


- Cr down trending


- BMP in AM 


- Continue IVF 





6. Thrombocytopenia 


- Likely secondary to severe sepsis


- No active signs of bleeding


- Hold chemical AC


- Will monitor 





7. Brachial plexus inflammation


- 7 day course of pednisone, taper ordered 





8. Rectal Ca, carcinoid


- Octreotide monthly, follows up with specialist as outpatient





9. Migraines


- Continue Topamax





10. DVT ppx


- Hold chemical AC d/t above


- B/l SCDs








Visit type





- Emergency Visit


Emergency Visit: Yes


ED Registration Date: 03/27/17


Care time: The patient presented to the Emergency Department on the above date 

and was hospitalized for further evaluation of their emergent condition.





- New Patient


This patient is new to me today: Yes


Date on this admission: 03/28/17





- Critical Care


Critical Care patient: No

## 2017-03-28 NOTE — CONSULT
Consult


Consult Specialty:: infectious diseases


Reason for Consultation:: bacteremia





- History of Present Illness


Chief Complaint: weakness fatigue,fever


History of Present Illness: 


60 y/o female with h/o rectal CA, GERD and migraine headaches  c/o excessive 

thirst and urination and feeling dehydrated.  The patient states that these 

symptoms have been present for about 4 weeks.  She has also had intermittent 

fevers, chills and coughing (non-productive), as well as night sweats.  She 

denies chest pain, SOB, abdominal pain, nausea, vomiting and diarrhea.  She has 

no prior h/o DM but says that she was told that she has "pre-diabetes."  The 

patient states that she recently started a regimen of prednisone for a brachial 

plexus inflammation.


she was not able to move her arm and was diagnosed with brachial plexus 

inflammation


patient could not finally take the weakness and came to the hospital


patient is a teacher


patient was worked up and found to have bacteremia and uti





- History Source


History Provided By: Patient


Limitations to Obtaining History: No Limitations





- Alcohol/Substance Use


Hx Alcohol Use: Yes (SOCIAL)





- Smoking History


Smoking history: Never smoked


Have you smoked in the past 12 months: No





Home Medications





- Allergies


Allergies/Adverse Reactions: 


 Allergies











Allergy/AdvReac Type Severity Reaction Status Date / Time


 


everolimus [From Afinitor] AdvReac Severe SOB, Verified 03/27/17 11:58





   Myalgias,  





   Arthralgias,Edema  


 


erythromycin base AdvReac   Verified 03/27/17 11:58





[Erythromycin Base]     














- Home Medications


Home Medications: 


Ambulatory Orders





Spironolactone [Aldactone] 25 mg PO ASDIR PRN 05/13/15 


Montelukast Na [Singulair -] 10 mg PO HS 03/27/17 


Octreotide Acetate,Mi-Spheres [Sandostatin [place]] 30 mg IM MONTHLY 03/27/17 


Prednisone [Deltasone] 20 mg PO DAILY 03/27/17 


Topiramate [Topamax] 50 mg PO DAILY 03/27/17 











Review of Systems





- Review of Systems


Constitutional: reports: Fever, Lethargy, Loss of Appetite, Weakness


Eyes: reports: No Symptoms


HENT: reports: No Symptoms


Neck: reports: No Symptoms


Cardiovascular: reports: No Symptoms


Respiratory: reports: Cough


Gastrointestinal: reports: Nausea, Other (increased thirst)


Genitourinary: reports: No Symptoms


Musculoskeletal: reports: No Symptoms


Integumentary: reports: No Symptoms


Neurological: reports: No Symptoms


Endocrine: reports: No Symptoms


Hematology/Lymphatic: reports: No Symptoms


Psychiatric: reports: No Symptoms





Physical Exam


Vital Signs: 


 Vital Signs











Temperature  98.2 F   03/28/17 14:09


 


Pulse Rate  81   03/28/17 14:09


 


Respiratory Rate  20   03/28/17 14:09


 


Blood Pressure  122/71   03/28/17 14:09


 


O2 Sat by Pulse Oximetry (%)  97   03/28/17 10:14











Constitutional: Yes: No Distress, Calm


Eyes: Yes: Conjunctiva Clear


HENT: Yes: Atraumatic


Neck: Yes: Supple, Trachea Midline


Cardiovascular: Yes: Regular Rate and Rhythm


Respiratory: Yes: Regular


Gastrointestinal: Yes: Normal Bowel Sounds, Soft


Musculoskeletal: Yes: WNL


Extremities: Yes: WNL


Integumentary: Yes: WNL


Neurological: Yes: Alert, Oriented


Psychiatric: Yes: Alert, Oriented


Labs: 


 CBC, BMP





 03/28/17 06:55 





 03/28/17 06:55 











Imaging





- Results


Chest X-ray: Report Reviewed, Image Reviewed





Assessment/Plan


60 y/o F w/PMH of rectal ca, GERD, migraines, pre-diabetes presented to ER w/ c/

o polydipsia, polyuria, and intermittent fevers for 4 weeks. Admitted for 

severe sepsis secondary to UTI.





- sepsis secondary to UTI


-ADAM secondary to severe sepsis


-Thrombocytopenia 


-Abd pain 


-Hyperglycemia, polydipsia, polyuria


-Brachial plexus inflammation


-Rectal Ca, carcinoid


-migraines





plan


will start patient on abx


will recheck blood cx tomorrow


rest ct hydration


watch for fevers

## 2017-03-28 NOTE — EKG
Test Reason : 

Blood Pressure : ***/*** mmHG

Vent. Rate : 087 BPM     Atrial Rate : 087 BPM

   P-R Int : 162 ms          QRS Dur : 082 ms

    QT Int : 364 ms       P-R-T Axes : 060 063 028 degrees

   QTc Int : 438 ms

 

NORMAL SINUS RHYTHM

NORMAL ECG

NO PREVIOUS ECGS AVAILABLE

Confirmed by EILEEN CHOUDHARY MD ([id number]) on 3/28/2017 1:40:24 PM

 

Referred By:  CANDIS           Confirmed By:EILEEN CHOUDHARY MD

## 2017-03-29 LAB
ALBUMIN SERPL-MCNC: 1.8 G/DL (ref 3.4–5)
ALP SERPL-CCNC: 161 U/L (ref 45–117)
ALT SERPL-CCNC: 25 U/L (ref 12–78)
ANION GAP SERPL CALC-SCNC: 13 MMOL/L (ref 8–16)
AST SERPL-CCNC: 12 U/L (ref 15–37)
BASOPHILS # BLD: 0.3 % (ref 0–2)
BILIRUB SERPL-MCNC: 0.5 MG/DL (ref 0.2–1)
CALCIUM SERPL-MCNC: 7.7 MG/DL (ref 8.5–10.1)
CO2 SERPL-SCNC: 17 MMOL/L (ref 21–32)
CREAT SERPL-MCNC: 1.4 MG/DL (ref 0.55–1.02)
DEPRECATED RDW RBC AUTO: 15.2 % (ref 11.6–15.6)
EOSINOPHIL # BLD: 0.5 % (ref 0–4.5)
GLUCOSE SERPL-MCNC: 187 MG/DL (ref 74–106)
MAGNESIUM SERPL-MCNC: 1.7 MG/DL (ref 1.8–2.4)
MCH RBC QN AUTO: 30.2 PG (ref 25.7–33.7)
MCHC RBC AUTO-ENTMCNC: 33.1 G/DL (ref 32–36)
MCV RBC: 91.2 FL (ref 80–96)
NEUTROPHILS # BLD: 88.2 % (ref 42.8–82.8)
PHOSPHATE SERPL-MCNC: 1.9 MG/DL (ref 2.5–4.9)
PLATELET # BLD AUTO: 42 K/MM3 (ref 134–434)
PMV BLD: 9.1 FL (ref 7.5–11.1)
PROT SERPL-MCNC: 5.1 G/DL (ref 6.4–8.2)
WBC # BLD AUTO: 11.3 K/MM3 (ref 4–10)

## 2017-03-29 RX ADMIN — SODIUM CHLORIDE SCH MLS/HR: 9 INJECTION, SOLUTION INTRAVENOUS at 05:42

## 2017-03-29 RX ADMIN — ALBUTEROL SULFATE SCH AMP: 2.5 SOLUTION RESPIRATORY (INHALATION) at 11:47

## 2017-03-29 RX ADMIN — TOPIRAMATE SCH MG: 25 TABLET, FILM COATED ORAL at 09:58

## 2017-03-29 RX ADMIN — INSULIN ASPART SCH UNITS: 100 INJECTION, SOLUTION INTRAVENOUS; SUBCUTANEOUS at 05:42

## 2017-03-29 RX ADMIN — MEROPENEM SCH MLS/HR: 1 INJECTION, POWDER, FOR SOLUTION INTRAVENOUS at 17:37

## 2017-03-29 RX ADMIN — POTASSIUM & SODIUM PHOSPHATES POWDER PACK 280-160-250 MG SCH PACKET: 280-160-250 PACK at 14:19

## 2017-03-29 RX ADMIN — POLYETHYLENE GLYCOL 3350 SCH: 17 POWDER, FOR SOLUTION ORAL at 21:38

## 2017-03-29 RX ADMIN — INSULIN ASPART SCH UNIT: 100 INJECTION, SOLUTION INTRAVENOUS; SUBCUTANEOUS at 12:28

## 2017-03-29 RX ADMIN — ALBUTEROL SULFATE SCH AMP: 2.5 SOLUTION RESPIRATORY (INHALATION) at 06:35

## 2017-03-29 RX ADMIN — ALBUTEROL SULFATE SCH AMP: 2.5 SOLUTION RESPIRATORY (INHALATION) at 18:30

## 2017-03-29 RX ADMIN — MEROPENEM SCH MLS/HR: 1 INJECTION, POWDER, FOR SOLUTION INTRAVENOUS at 09:58

## 2017-03-29 RX ADMIN — INSULIN ASPART SCH UNIT: 100 INJECTION, SOLUTION INTRAVENOUS; SUBCUTANEOUS at 17:42

## 2017-03-29 RX ADMIN — POTASSIUM & SODIUM PHOSPHATES POWDER PACK 280-160-250 MG SCH PACKET: 280-160-250 PACK at 21:40

## 2017-03-29 RX ADMIN — INSULIN ASPART SCH UNIT: 100 INJECTION, SOLUTION INTRAVENOUS; SUBCUTANEOUS at 21:38

## 2017-03-29 RX ADMIN — INSULIN ASPART SCH UNITS: 100 INJECTION, SOLUTION INTRAVENOUS; SUBCUTANEOUS at 06:13

## 2017-03-29 RX ADMIN — MONTELUKAST SODIUM SCH MG: 10 TABLET, COATED ORAL at 21:38

## 2017-03-29 RX ADMIN — ACETAMINOPHEN PRN MG: 325 TABLET ORAL at 03:09

## 2017-03-29 RX ADMIN — MEROPENEM SCH MLS/HR: 1 INJECTION, POWDER, FOR SOLUTION INTRAVENOUS at 02:08

## 2017-03-29 NOTE — PN
Progress Note, Physician


History of Present Illness: 


today patient feels weak and tired


but otherwise doing well


no fevers





- Current Medication List


Current Medications: 


Active Medications





Acetaminophen (Tylenol -)  650 mg PO Q6H PRN


   PRN Reason: FEVER OR PAIN


   Last Admin: 03/29/17 03:09 Dose:  650 mg


Albuterol Sulfate (Ventolin 0.083% Nebulizer Soln -)  1 amp NEB QIDR RAJ


   Last Admin: 03/29/17 11:47 Dose:  1 amp


Docusate Sodium (Colace -)  100 mg PO BID PRN


   PRN Reason: CONSTIPATION


Sodium Chloride (Normal Saline -)  1,000 mls @ 100 mls/hr IV ASDIR RAJ


   Last Admin: 03/29/17 05:42 Dose:  100 mls/hr


Meropenem 1 gm/ Dextrose  100 mls @ 100 mls/hr IVPB Q8H-IV RAJ


   PRN Reason: Protocol


   Last Admin: 03/29/17 09:58 Dose:  100 mls/hr


Insulin Aspart (Novolog Vial Sliding Scale -)  1 vial SQ ACHS RAJ


   PRN Reason: Protocol


   Last Admin: 03/29/17 12:28 Dose:  2 unit


Montelukast Sodium (Singulair -)  10 [facility] PO [facility]


   Last Admin: 03/28/17 21:38 Dose:  10 mg


Polyethylene Glycol (Miralax (For Daily Use) -)  17 gm PO HS [facility]


Potassium Phos/Sodium Phos (Phos-Nak Packet -)  1 packet PO TID RAJ


   Stop: 03/31/17 13:59


   Last Admin: 03/29/17 14:19 Dose:  1 packet


Sodium Chloride ([place] Spray Nasal Spray -)  2 spray NS TID PRN


   PRN Reason: NASAL CONGESTION


   Last Admin: 03/28/17 21:39 Dose:  2 sprays


Topiramate (Topamax -)  50 mg PO DAILY [facility]


   Last Admin: 03/29/17 09:58 Dose:  50 mg











- Objective


Vital Signs: 


 Vital Signs











Temperature  98 F   03/29/17 14:08


 


Pulse Rate  68   03/29/17 14:08


 


Respiratory Rate  16   03/29/17 14:08


 


Blood Pressure  111/63   03/29/17 14:08


 


O2 Sat by Pulse Oximetry (%)  97   03/29/17 10:00











Constitutional: Yes: No Distress, Calm


Cardiovascular: Yes: Regular Rate and Rhythm


Respiratory: Yes: Regular, CTA Bilaterally


Gastrointestinal: Yes: Normal Bowel Sounds, Soft


Musculoskeletal: Yes: WNL


Extremities: Yes: WNL


Neurological: Yes: Alert, Oriented


Psychiatric: Yes: Alert, Oriented


Labs: 


 CBC, BMP





 03/29/17 07:06 





 03/29/17 07:06 











Assessment/Plan


60 y/o F w/PMH of rectal ca, GERD, migraines, pre-diabetes presented to ER w/ c/

o polydipsia, polyuria, and intermittent fevers for 4 weeks. Admitted for 

severe sepsis secondary to UTI.





- sepsis secondary to UTI


-ADAM secondary to severe sepsis


-Thrombocytopenia 


-Abd pain 


-Hyperglycemia, polydipsia, polyuria


-Brachial plexus inflammation


-Rectal Ca, carcinoid


-migraines





plan


continue abx


await for identification of the organism


await for repeat blood cx report


hydration

## 2017-03-29 NOTE — PN
Physical Exam: 


SUBJECTIVE: Patient seen and examined. Afebrile overnight. She feels tired. She 

denies fever, sob, cp, chills. She sat in the chair and ambulated. 








OBJECTIVE:





 Vital Signs











 Period  Temp  Pulse  Resp  BP Sys/Bethea  Pulse Ox


 


 Last 24 Hr  96.3 F-98.5 F    16-20  106-130/61-71  95-97








PE


Neuro: alert, awake, cn 2-12intact


Pulm: CTAB, mild tachypnea 


CV: s1 s2 rrr no mrg


Abd: s nt nd + bs


Ext: warm, no le edema 


 CBCD











WBC  11.3 K/mm3 (4.0-10.0)  H  03/29/17  07:06    


 


RBC  2.97 M/mm3 (3.60-5.2)  L  03/29/17  07:06    


 


Hgb  9.0 GM/dL (10.7-15.3)  L  03/29/17  07:06    


 


Hct  27.1 % (32.4-45.2)  L  03/29/17  07:06    


 


MCV  91.2 fl (80-96)   03/29/17  07:06    


 


MCHC  33.1 g/dl (32.0-36.0)   03/29/17  07:06    


 


RDW  15.2 % (11.6-15.6)   03/29/17  07:06    


 


Plt Count  42 K/MM3 (134-434)  L  03/29/17  07:06    


 


MPV  9.1 fl (7.5-11.1)   03/29/17  07:06    








 CMP











Sodium  141 mmol/L (136-145)   03/29/17  07:06    


 


Potassium  3.5 mmol/L (3.5-5.1)   03/29/17  07:06    


 


Chloride  111 mmol/L ()  H  03/29/17  07:06    


 


Carbon Dioxide  17 mmol/L (21-32)  L  03/29/17  07:06    


 


Anion Gap  13  (8-16)   03/29/17  07:06    


 


BUN  31 mg/dL (7-18)  H  03/29/17  07:06    


 


Creatinine  1.4 mg/dL (0.55-1.02)  H  03/29/17  07:06    


 


Creat Clearance w eGFR  38.23  (>60)   03/29/17  07:06    


 


Calcium  7.7 mg/dL (8.5-10.1)  L  03/29/17  07:06    


 


Total Bilirubin  0.5 mg/dL (0.2-1.0)   03/29/17  07:06    


 


AST  12 U/L (15-37)  L D 03/29/17  07:06    


 


ALT  25 U/L (12-78)   03/29/17  07:06    


 


Alkaline Phosphatase  161 U/L ()  H  03/29/17  07:06    


 


Total Protein  5.1 g/dl (6.4-8.2)  L  03/29/17  07:06    


 


Albumin  1.8 g/dl (3.4-5.0)  L  03/29/17  07:06    














  03/27/17 03/28/17





  21:00 06:55


 


Hemoglobin A1c %  6.6 H D 


 


Lactic Acid   1.083








Active Medications











Generic Name Dose Route Start Last Admin





  Trade Name Freq  PRN Reason Stop Dose Admin


 


Acetaminophen  650 mg 03/27/17 21:00 03/29/17 03:09





  Tylenol -  PO   650 mg





  Q6H PRN   Administration





  FEVER OR PAIN   


 


Albuterol Sulfate  1 amp 03/28/17 18:00 03/29/17 11:47





  Ventolin 0.083% Nebulizer Soln -  NEB   1 amp





  QIDR RAJ   Administration


 


Docusate Sodium  100 mg 03/27/17 22:02  





  Colace -  PO   





  BID PRN   





  CONSTIPATION   


 


Sodium Chloride  1,000 mls @ 100 mls/hr 03/28/17 05:15 03/29/17 05:42





  Normal Saline -  IV   100 mls/hr





  ASDIR RAJ   Administration


 


Meropenem 1 gm/ Dextrose  100 mls @ 100 mls/hr 03/28/17 19:00 03/29/17 09:58





  IVPB   100 mls/hr





  Q8H-IV RAJ   Administration





  Protocol   


 


Insulin Aspart  1 vial 03/29/17 11:00 03/29/17 12:28





  Novolog Vial Sliding Scale -  SQ   2 unit





  ACHS RAJ   Administration





  Protocol   


 


Montelukast Sodium  10 mg 03/28/17 22:00 03/28/17 21:38





  Singulair -  PO   10 mg





  HS RAJ   Administration


 


Polyethylene Glycol  17 gm 03/29/17 00:15  





  Miralax (For Daily Use) -  PO   





  HS RAJ   


 


Potassium Phos/Sodium Phos  1 packet 03/29/17 14:00 03/29/17 14:19





  Phos-Nak Packet -  PO 03/31/17 13:59  1 packet





  TID RAJ   Administration


 


Sodium Chloride  2 spray 03/28/17 05:08 03/28/17 21:39





  [place] Spray Nasal Spray -  NS   2 sprays





  TID PRN   Administration





  NASAL CONGESTION   


 


Topiramate  50 mg 03/28/17 10:00 03/29/17 09:58





  Topamax -  PO   50 mg





  DAILY RAJ   Administration








 Microbiology











 03/27/17 13:25 Blood Culture - Preliminary





 Blood - Peripheral Venous    Non Lactose Fermenting Gnb


 


 03/27/17 13:30 Blood Culture - Preliminary





 Blood - Peripheral Venous    Non Lactose Fermenting Gnb


 


 03/27/17 13:25 Urine Culture - Preliminary





 Urine - Urine Clean Catch    Proteus Mirabilis


 


 03/27/17 12:25 Respiratory Virus Panel - Preliminary





 Nasopharyngeal Swab 














Assessment: 61 year old female with PMHx of GERD, prediabetes, rectal carcinoid 

ca, migraines admitted with polydipsia, polyuria, intermittent fevers/chills 

for 4 weeks, treated with tamiflu. 





Plan: 





1. Severe sepsis likely gram negative bacteremia and UTI 


- Meropenem 1gm q8 (day 2) 


- BC non lactose permenting GBn


- Repeat BC today are pending


- Continue IVF 





2. UTI d/t Proteus Mirabilis


- Final speciation resulted


- Abx per ID, currently on meropenem (sensitive)





3. Hyperlgycemia/borderline uncontrolled DM II 


- Hgba1c  6.6 


- ISS, BGM ACHS 


- Prednisone taper completed today  





4. Hyponatrmia


- Likely dehydration from polyuria from uncontrolled DM, and sepsis 


- Now resolved 





5. ADAM 


- Continues to improve 


- Continue IVF 





6. Thrombocytopenia 


- Improving slowly 


- Likely secondary to severe sepsis


- No active signs of bleeding


- Hold chemical AC





7. Brachial plexus inflammation


- 7 day course of pednisone, completed today 





8. Rectal Ca, carcinoid


- Octreotide monthly, follows up with specialist as outpatient





9. Migraines


- Continue Topamax





10. DVT ppx


- Hold chemical AC d/t above


- B/l SCDs 





Visit type





- Emergency Visit


Emergency Visit: Yes


ED Registration Date: 03/27/17


Care time: The patient presented to the Emergency Department on the above date 

and was hospitalized for further evaluation of their emergent condition.





- New Patient


This patient is new to me today: No





- Critical Care


Critical Care patient: No

## 2017-03-30 LAB
ALBUMIN SERPL-MCNC: 1.8 G/DL (ref 3.4–5)
ALP SERPL-CCNC: 180 U/L (ref 45–117)
ALT SERPL-CCNC: 28 U/L (ref 12–78)
ANION GAP SERPL CALC-SCNC: 11 MMOL/L (ref 8–16)
AST SERPL-CCNC: 12 U/L (ref 15–37)
BASOPHILS # BLD: 0.3 % (ref 0–2)
BILIRUB SERPL-MCNC: 0.4 MG/DL (ref 0.2–1)
CALCIUM SERPL-MCNC: 7.7 MG/DL (ref 8.5–10.1)
CO2 SERPL-SCNC: 19 MMOL/L (ref 21–32)
CREAT SERPL-MCNC: 1.2 MG/DL (ref 0.55–1.02)
DEPRECATED RDW RBC AUTO: 15.3 % (ref 11.6–15.6)
EOSINOPHIL # BLD: 0.5 % (ref 0–4.5)
GLUCOSE SERPL-MCNC: 161 MG/DL (ref 74–106)
MAGNESIUM SERPL-MCNC: 2.2 MG/DL (ref 1.8–2.4)
MCH RBC QN AUTO: 30.5 PG (ref 25.7–33.7)
MCHC RBC AUTO-ENTMCNC: 33.8 G/DL (ref 32–36)
MCV RBC: 90.4 FL (ref 80–96)
NEUTROPHILS # BLD: 86.4 % (ref 42.8–82.8)
PHOSPHATE SERPL-MCNC: 3.4 MG/DL (ref 2.5–4.9)
PLATELET # BLD AUTO: 45 K/MM3 (ref 134–434)
PMV BLD: 9.1 FL (ref 7.5–11.1)
PROT SERPL-MCNC: 5.3 G/DL (ref 6.4–8.2)
WBC # BLD AUTO: 10.7 K/MM3 (ref 4–10)

## 2017-03-30 RX ADMIN — POTASSIUM & SODIUM PHOSPHATES POWDER PACK 280-160-250 MG SCH PACKET: 280-160-250 PACK at 21:05

## 2017-03-30 RX ADMIN — ALBUTEROL SULFATE SCH AMP: 2.5 SOLUTION RESPIRATORY (INHALATION) at 00:02

## 2017-03-30 RX ADMIN — ALBUTEROL SULFATE SCH AMP: 2.5 SOLUTION RESPIRATORY (INHALATION) at 11:55

## 2017-03-30 RX ADMIN — ALBUTEROL SULFATE SCH AMP: 2.5 SOLUTION RESPIRATORY (INHALATION) at 06:18

## 2017-03-30 RX ADMIN — SODIUM CHLORIDE SCH: 9 INJECTION, SOLUTION INTRAVENOUS at 06:14

## 2017-03-30 RX ADMIN — MONTELUKAST SODIUM SCH MG: 10 TABLET, COATED ORAL at 21:04

## 2017-03-30 RX ADMIN — MEROPENEM SCH MLS/HR: 1 INJECTION, POWDER, FOR SOLUTION INTRAVENOUS at 13:14

## 2017-03-30 RX ADMIN — SODIUM CHLORIDE SCH MLS/HR: 9 INJECTION, SOLUTION INTRAVENOUS at 09:56

## 2017-03-30 RX ADMIN — POLYETHYLENE GLYCOL 3350 SCH: 17 POWDER, FOR SOLUTION ORAL at 21:05

## 2017-03-30 RX ADMIN — INSULIN ASPART SCH: 100 INJECTION, SOLUTION INTRAVENOUS; SUBCUTANEOUS at 13:15

## 2017-03-30 RX ADMIN — INSULIN ASPART SCH: 100 INJECTION, SOLUTION INTRAVENOUS; SUBCUTANEOUS at 17:32

## 2017-03-30 RX ADMIN — ALBUTEROL SULFATE SCH AMP: 2.5 SOLUTION RESPIRATORY (INHALATION) at 17:15

## 2017-03-30 RX ADMIN — MEROPENEM SCH MLS/HR: 1 INJECTION, POWDER, FOR SOLUTION INTRAVENOUS at 17:51

## 2017-03-30 RX ADMIN — ALBUTEROL SULFATE SCH: 2.5 SOLUTION RESPIRATORY (INHALATION) at 23:15

## 2017-03-30 RX ADMIN — MEROPENEM SCH MLS/HR: 1 INJECTION, POWDER, FOR SOLUTION INTRAVENOUS at 01:46

## 2017-03-30 RX ADMIN — POTASSIUM & SODIUM PHOSPHATES POWDER PACK 280-160-250 MG SCH PACKET: 280-160-250 PACK at 06:23

## 2017-03-30 RX ADMIN — TOPIRAMATE SCH MG: 25 TABLET, FILM COATED ORAL at 09:55

## 2017-03-30 RX ADMIN — SODIUM CHLORIDE SCH MLS/HR: 9 INJECTION, SOLUTION INTRAVENOUS at 21:08

## 2017-03-30 RX ADMIN — POTASSIUM & SODIUM PHOSPHATES POWDER PACK 280-160-250 MG SCH PACKET: 280-160-250 PACK at 16:02

## 2017-03-30 RX ADMIN — INSULIN ASPART SCH UNIT: 100 INJECTION, SOLUTION INTRAVENOUS; SUBCUTANEOUS at 06:23

## 2017-03-30 RX ADMIN — INSULIN ASPART SCH: 100 INJECTION, SOLUTION INTRAVENOUS; SUBCUTANEOUS at 21:05

## 2017-03-30 NOTE — PN
Progress Note, Physician


History of Present Illness: 


patient feeling much better


no new issues


still very tired








- Current Medication List


Current Medications: 


Active Medications





Acetaminophen (Tylenol -)  650 mg PO Q6H PRN


   PRN Reason: FEVER OR PAIN


   Last Admin: 03/29/17 03:09 Dose:  650 mg


Albuterol Sulfate (Ventolin 0.083% Nebulizer Soln -)  1 amp NEB QIDR RAJ


   Last Admin: 03/30/17 11:55 Dose:  1 amp


Docusate Sodium (Colace -)  100 mg PO BID PRN


   PRN Reason: CONSTIPATION


Sodium Chloride (Normal Saline -)  1,000 mls @ 100 mls/hr IV ASDIR RAJ


   Last Admin: 03/30/17 09:56 Dose:  100 mls/hr


Meropenem 1 gm/ Dextrose  100 mls @ 100 mls/hr IVPB Q8H-IV RAJ


   PRN Reason: Protocol


   Last Admin: 03/30/17 13:14 Dose:  100 mls/hr


Insulin Aspart (Novolog Vial Sliding Scale -)  1 vial SQ ACHS RAJ


   PRN Reason: Protocol


   Last Admin: 03/30/17 13:15 Dose:  Not Given


Montelukast Sodium (Singulair -)  10 mg PO HS [facility]


   Last Admin: 03/29/17 21:38 Dose:  10 mg


Polyethylene Glycol (Miralax (For Daily Use) -)  17 gm PO HS [facility]


   Last Admin: 03/29/17 21:38 Dose:  Not Given


Potassium Phos/Sodium Phos (Phos-Nak Packet -)  1 packet PO TID RAJ


   Stop: 03/31/17 13:59


   Last Admin: 03/30/17 06:23 Dose:  1 packet


Sodium Chloride ([place] Spray Nasal Spray -)  2 spray NS TID PRN


   PRN Reason: NASAL CONGESTION


   Last Admin: 03/28/17 21:39 Dose:  2 sprays


Topiramate (Topamax -)  50 mg PO DAILY [facility]


   Last Admin: 03/30/17 09:55 Dose:  50 mg











- Objective


Vital Signs: 


 Vital Signs











Temperature  98.2 F   03/30/17 14:17


 


Pulse Rate  100 H  03/30/17 14:17


 


Respiratory Rate  20   03/30/17 14:17


 


Blood Pressure  100/63   03/30/17 14:17


 


O2 Sat by Pulse Oximetry (%)  95   03/30/17 10:00











Constitutional: Yes: No Distress, Calm


Cardiovascular: Yes: Regular Rate and Rhythm


Respiratory: Yes: Regular, CTA Bilaterally


Gastrointestinal: Yes: Normal Bowel Sounds, Soft


Musculoskeletal: Yes: WNL


Extremities: Yes: WNL


Neurological: Yes: Alert, Oriented


Psychiatric: Yes: Alert


Labs: 


 CBC, BMP





 03/30/17 06:00 





 03/30/17 06:00 











Assessment/Plan


62 y/o F w/PMH of rectal ca, GERD, migraines, pre-diabetes presented to ER w/ c/

o polydipsia, polyuria, and intermittent fevers for 4 weeks. Admitted for 

severe sepsis secondary to UTI.





- sepsis secondary to UTI


-ADAM secondary to severe sepsis


-Thrombocytopenia 


-Abd pain 


-Hyperglycemia, polydipsia, polyuria


-Brachial plexus inflammation


-Rectal Ca, carcinoid


-migraines





plan


continue abx


await for identification of the organism


repeat blood cx not growing yet


hydration

## 2017-03-30 NOTE — HOSP
Subjective





- Review of Symptoms


Events since last encounter: 


called to see pt by nurse for redness on left hand.


Subjective: 


pt reports pain and redness to left mid dorsal hand.





Physical Examination


Vital Signs: 


 Vital Signs











Temperature  99.2 F   03/30/17 18:00


 


Pulse Rate  99 H  03/30/17 18:00


 


Respiratory Rate  18   03/30/17 18:00


 


Blood Pressure  136/76   03/30/17 18:00


 


O2 Sat by Pulse Oximetry (%)  95   03/30/17 10:00











Integumentary: Yes: Other (erythema left wrist, middle, dorsal aspect. IV site 

in place left hand, approx 1-2 cm medial to area of redness. IV site without 

redness, infusing freeely with no swelling.)


Labs: 


 CBC, BMP





 03/30/17 06:00 





 03/30/17 06:00 











Hospitalist Encounter


Assessment: 


redness to left hand, near iv site


   - will remove IV, although no s/s acute site infection or phlebitis


   - ice to site


   - monitor for increasing redness or s/s abscess formation.

## 2017-03-30 NOTE — PN
Physical Exam: 


SUBJECTIVE: Patient seen and examined.  States she feels week.  On 2 liters of 

nasal cannula, states her breathing is labored with minimal exertion.








OBJECTIVE:


On exam bilateral lungs clear, no accessory muscle use.


bilateral lower extremity with non pitting equal edema.


Pt states she has Lasix at home that she uses when "her legs get swollen but 

only as needed"


Denies any pain/discomfort other than "feeling weak"


 Vital Signs











 Period  Temp  Pulse  Resp  BP Sys/Bethea  Pulse Ox


 


 Last 24 Hr  97.7 F-99.2 F  68-98  16-20  111-146/63-76  97-97











GENERAL: The patient is awake, alert, and fully oriented, in no acute distress.


HEAD: Normal with no signs of trauma.


EYES: PERRL, extraocular movements intact, sclera anicteric, conjunctiva clear. 

No ptosis. 


ENT: Ears normal, nares patent, oropharynx clear without exudates, moist mucous 

membranes.


NECK: Trachea midline, full range of motion, supple. 


LUNGS: Breath sounds equal, clear to auscultation bilaterally, no wheezes, no 

crackles, no accessory muscle use. 


HEART: Regular rate and rhythm, S1, S2 without murmur, rub or gallop.


ABDOMEN: Soft, nontender, nondistended, normoactive bowel sounds, no guarding, 

no rebound, no hepatosplenomegaly, no masses.


EXTREMITIES: 2+ pulses, warm, well-perfused, no edema. 


NEUROLOGICAL:  Normal speech, gait not observed.


PSYCH: Normal mood, normal affect.


SKIN: Warm, dry, normal turgor, no rashes or lesions noted








 Laboratory Results - last 24 hr











  03/29/17 03/29/17 03/29/17





  11:02 16:43 21:26


 


WBC   


 


RBC   


 


Hgb   


 


Hct   


 


MCV   


 


MCHC   


 


RDW   


 


Plt Count   


 


MPV   


 


Neutrophils %   


 


Lymphocytes %   


 


Monocytes %   


 


Eosinophils %   


 


Basophils %   


 


Sodium   


 


Potassium   


 


Chloride   


 


Carbon Dioxide   


 


Anion Gap   


 


BUN   


 


Creatinine   


 


Creat Clearance w eGFR   


 


POC Glucometer  193  172  218


 


Random Glucose   


 


Calcium   


 


Phosphorus   


 


Magnesium   


 


Total Bilirubin   


 


AST   


 


ALT   


 


Alkaline Phosphatase   


 


Total Protein   


 


Albumin   














  03/30/17 03/30/17 03/30/17





  05:36 06:00 06:00


 


WBC   10.7 H 


 


RBC   2.95 L 


 


Hgb   9.0 L 


 


Hct   26.7 L 


 


MCV   90.4 


 


MCHC   33.8 


 


RDW   15.3 


 


Plt Count   45 L 


 


MPV   9.1 


 


Neutrophils %   86.4 H 


 


Lymphocytes %   7.7 L 


 


Monocytes %   5.1 


 


Eosinophils %   0.5 


 


Basophils %   0.3 


 


Sodium    141


 


Potassium    4.1


 


Chloride    111 H


 


Carbon Dioxide    19 L


 


Anion Gap    11


 


BUN    23 H D


 


Creatinine    1.2 H


 


Creat Clearance w eGFR    45.67


 


POC Glucometer  169  


 


Random Glucose    161 H


 


Calcium    7.7 L


 


Phosphorus    3.4  D


 


Magnesium    2.2  D


 


Total Bilirubin    0.4


 


AST    12 L


 


ALT    28


 


Alkaline Phosphatase    180 H


 


Total Protein    5.3 L


 


Albumin    1.8 L








Active Medications











Generic Name Dose Route Start Last Admin





  Trade Name Freq  PRN Reason Stop Dose Admin


 


Acetaminophen  650 mg 03/27/17 21:00 03/29/17 03:09





  Tylenol -  PO   650 mg





  Q6H PRN   Administration





  FEVER OR PAIN   


 


Albuterol Sulfate  1 amp 03/28/17 18:00 03/30/17 06:18





  Ventolin 0.083% Nebulizer Soln -  NEB   1 amp





  QIDR RAJ   Administration


 


Docusate Sodium  100 mg 03/27/17 22:02  





  Colace -  PO   





  BID PRN   





  CONSTIPATION   


 


Sodium Chloride  1,000 mls @ 100 mls/hr 03/28/17 05:15 03/30/17 09:56





  Normal Saline -  IV   100 mls/hr





  ASDIR RAJ   Administration


 


Meropenem 1 gm/ Dextrose  100 mls @ 100 mls/hr 03/28/17 19:00 03/30/17 01:46





  IVPB   100 mls/hr





  Q8H-IV RAJ   Administration





  Protocol   


 


Insulin Aspart  1 vial 03/29/17 11:00 03/30/17 06:23





  Novolog Vial Sliding Scale -  SQ   2 unit





  ACHS RAJ   Administration





  Protocol   


 


Montelukast Sodium  10 mg 03/28/17 22:00 03/29/17 21:38





  Singulair -  PO   10 mg





  HS RAJ   Administration


 


Polyethylene Glycol  17 gm 03/29/17 00:15 03/29/17 21:38





  Miralax (For Daily Use) -  PO   Not Given





  HS RAJ   


 


Potassium Phos/Sodium Phos  1 packet 03/29/17 14:00 03/30/17 06:23





  Phos-Nak Packet -  PO 03/31/17 13:59  1 packet





  TID RAJ   Administration


 


Sodium Chloride  2 spray 03/28/17 05:08 03/28/17 21:39





  [place] Spray Nasal Spray -  NS   2 sprays





  TID PRN   Administration





  NASAL CONGESTION   


 


Topiramate  50 mg 03/28/17 10:00 03/30/17 09:55





  Topamax -  PO   50 mg





  DAILY RAJ   Administration











ASSESSMENT/PLAN:


Patient is a 61 year old female with PMHx of GERD, pre-diabetes, rectal 

carcinoid ca, migraines admitted with polydipsia, polyuria, intermittent fevers/

chills for 4 weeks.





ID:


Severe sepsis likely gram negative bacteremia and UTI - acute


Assessment/Plan: On Meropenem 1gm as per ID started on 3/28


Blood cultures with non lactose grub


On IVF of NS @100


Repeat blood cultures pending


ID following


 


Hematology:


Thrombocytopenia - improving


Assessment/Plan:  Likely secondary to sepsis


trending up, monitor, No signs of bleeding


No anticoags secondary to thrombocytopenia





Oncology:


Assessment/Plan:  Rectal Ca, carcinoid tumor


Octreotide monthly, follows up with specialist as outpatient





:


Acute Kidney Injury


Assessment/Plan:  likely secondary to dehydration


improving with IVF, monitor





Endocrine:


Hyperglycemia - DM II


hmga1c 6.6


monitor BGMs ac/hs


Was on prednisone taper - completed for brachial plexus inflammation





F.E.N.


Fluids: NS @ 100cc/hr


Electrolytes: hyponatremia resolved, bmp in a.m.


Nutrition: tolerating diet





DVT px: SCDs 














Visit type





- Emergency Visit


Emergency Visit: Yes


ED Registration Date: 03/27/17


Care time: The patient presented to the Emergency Department on the above date 

and was hospitalized for further evaluation of their emergent condition.





- New Patient


This patient is new to me today: Yes


Date on this admission: 04/18/17





- Critical Care


Critical Care patient: No





- Discharge Referral


Referred to [facility] No

## 2017-03-31 LAB
ALBUMIN SERPL-MCNC: 2.1 G/DL (ref 3.4–5)
ALP SERPL-CCNC: 189 U/L (ref 45–117)
ALT SERPL-CCNC: 30 U/L (ref 12–78)
ANION GAP SERPL CALC-SCNC: 9 MMOL/L (ref 8–16)
AST SERPL-CCNC: 14 U/L (ref 15–37)
BASOPHILS # BLD: 0.5 % (ref 0–2)
BILIRUB SERPL-MCNC: 0.5 MG/DL (ref 0.2–1)
CALCIUM SERPL-MCNC: 7.8 MG/DL (ref 8.5–10.1)
CO2 SERPL-SCNC: 22 MMOL/L (ref 21–32)
CREAT SERPL-MCNC: 1 MG/DL (ref 0.55–1.02)
DEPRECATED RDW RBC AUTO: 15.1 % (ref 11.6–15.6)
EOSINOPHIL # BLD: 0.8 % (ref 0–4.5)
GLUCOSE SERPL-MCNC: 115 MG/DL (ref 74–106)
MCH RBC QN AUTO: 30 PG (ref 25.7–33.7)
MCHC RBC AUTO-ENTMCNC: 33.2 G/DL (ref 32–36)
MCV RBC: 90.2 FL (ref 80–96)
NEUTROPHILS # BLD: 88 % (ref 42.8–82.8)
PLATELET # BLD AUTO: 79 K/MM3 (ref 134–434)
PMV BLD: 8.9 FL (ref 7.5–11.1)
PROT SERPL-MCNC: 5.9 G/DL (ref 6.4–8.2)
WBC # BLD AUTO: 12.8 K/MM3 (ref 4–10)

## 2017-03-31 RX ADMIN — MEROPENEM SCH MLS/HR: 1 INJECTION, POWDER, FOR SOLUTION INTRAVENOUS at 09:16

## 2017-03-31 RX ADMIN — ALBUTEROL SULFATE SCH: 2.5 SOLUTION RESPIRATORY (INHALATION) at 23:10

## 2017-03-31 RX ADMIN — MEROPENEM SCH MLS/HR: 1 INJECTION, POWDER, FOR SOLUTION INTRAVENOUS at 01:08

## 2017-03-31 RX ADMIN — SODIUM CHLORIDE SCH: 9 INJECTION, SOLUTION INTRAVENOUS at 06:17

## 2017-03-31 RX ADMIN — INSULIN ASPART SCH: 100 INJECTION, SOLUTION INTRAVENOUS; SUBCUTANEOUS at 12:24

## 2017-03-31 RX ADMIN — ALBUTEROL SULFATE SCH: 2.5 SOLUTION RESPIRATORY (INHALATION) at 06:58

## 2017-03-31 RX ADMIN — MEROPENEM SCH MLS/HR: 1 INJECTION, POWDER, FOR SOLUTION INTRAVENOUS at 17:45

## 2017-03-31 RX ADMIN — ALBUTEROL SULFATE SCH AMP: 2.5 SOLUTION RESPIRATORY (INHALATION) at 23:06

## 2017-03-31 RX ADMIN — INSULIN ASPART SCH: 100 INJECTION, SOLUTION INTRAVENOUS; SUBCUTANEOUS at 22:10

## 2017-03-31 RX ADMIN — INSULIN ASPART SCH: 100 INJECTION, SOLUTION INTRAVENOUS; SUBCUTANEOUS at 06:06

## 2017-03-31 RX ADMIN — POTASSIUM & SODIUM PHOSPHATES POWDER PACK 280-160-250 MG SCH PACKET: 280-160-250 PACK at 06:16

## 2017-03-31 RX ADMIN — ACETAMINOPHEN PRN MG: 325 TABLET ORAL at 21:56

## 2017-03-31 RX ADMIN — ALBUTEROL SULFATE SCH: 2.5 SOLUTION RESPIRATORY (INHALATION) at 11:48

## 2017-03-31 RX ADMIN — ALBUTEROL SULFATE SCH: 2.5 SOLUTION RESPIRATORY (INHALATION) at 17:28

## 2017-03-31 RX ADMIN — INSULIN ASPART SCH: 100 INJECTION, SOLUTION INTRAVENOUS; SUBCUTANEOUS at 17:44

## 2017-03-31 RX ADMIN — TOPIRAMATE SCH MG: 25 TABLET, FILM COATED ORAL at 10:25

## 2017-03-31 RX ADMIN — ACETAMINOPHEN PRN MG: 325 TABLET ORAL at 15:00

## 2017-03-31 RX ADMIN — ACETAMINOPHEN PRN MG: 325 TABLET ORAL at 01:05

## 2017-03-31 RX ADMIN — MONTELUKAST SODIUM SCH MG: 10 TABLET, COATED ORAL at 21:56

## 2017-03-31 RX ADMIN — POLYETHYLENE GLYCOL 3350 SCH: 17 POWDER, FOR SOLUTION ORAL at 22:00

## 2017-03-31 NOTE — PN
Progress Note, Physician


History of Present Illness: 


patient feeling much better


no new issues


still very tired


patient fatigued





- Current Medication List


Current Medications: 


Active Medications





Acetaminophen (Tylenol -)  650 mg PO Q6H PRN


   PRN Reason: FEVER OR PAIN


   Last Admin: 03/31/17 15:00 Dose:  650 mg


Albuterol Sulfate (Ventolin 0.083% Nebulizer Soln -)  1 amp NEB QIDR [facility]


   Last Admin: 03/31/17 11:48 Dose:  Not Given


Docusate Sodium (Colace -)  100 mg PO BID PRN


   PRN Reason: CONSTIPATION


Meropenem 1 gm/ Dextrose  100 mls @ 100 mls/hr IVPB Q8H-IV RAJ


   PRN Reason: Protocol


   Last Admin: 03/31/17 09:16 Dose:  100 mls/hr


Insulin Aspart (Novolog Vial Sliding Scale -)  1 vial SQ ACHS RAJ


   PRN Reason: Protocol


   Last Admin: 03/31/17 12:24 Dose:  Not Given


Lorazepam (Ativan -)  1 mg PO BID PRN


   PRN Reason: ANXIETY


Montelukast Sodium (Singulair -)  10 mg PO [facility]


   Last Admin: 03/30/17 21:04 Dose:  10 mg


Polyethylene Glycol (Miralax (For Daily Use) -)  17 gm PO [facility]


   Last Admin: 03/30/17 21:05 Dose:  Not Given


Sodium Chloride ([place] Spray Nasal Spray -)  2 spray NS TID PRN


   PRN Reason: NASAL CONGESTION


   Last Admin: 03/28/17 21:39 Dose:  2 sprays


Topiramate (Topamax -)  50 mg PO DAILY [facility]


   Last Admin: 03/31/17 10:25 Dose:  50 mg











- Objective


Vital Signs: 


 Vital Signs











Temperature  97.9 F   03/31/17 13:44


 


Pulse Rate  88   03/31/17 13:44


 


Respiratory Rate  20   03/31/17 13:44


 


Blood Pressure  119/72   03/31/17 13:44


 


O2 Sat by Pulse Oximetry (%)  97   03/31/17 09:00











Constitutional: Yes: No Distress, Calm


Cardiovascular: Yes: Regular Rate and Rhythm


Respiratory: Yes: Regular, CTA Bilaterally


Gastrointestinal: Yes: Normal Bowel Sounds, Soft


Musculoskeletal: Yes: WNL


Extremities: Yes: WNL


Wound/Incision: Yes: Well Approximated


Neurological: Yes: Alert, Oriented


Psychiatric: Yes: Alert, Oriented


Labs: 


 CBC, BMP





 03/31/17 11:09 





 03/31/17 11:09 











Assessment/Plan


62 y/o F w/PMH of rectal ca, GERD, migraines, pre-diabetes presented to ER w/ c/

o polydipsia, polyuria, and intermittent fevers for 4 weeks. Admitted for 

severe sepsis secondary to UTI.





- sepsis secondary to UTI


-ADAM secondary to severe sepsis


-Thrombocytopenia 


-Abd pain 


-Hyperglycemia, polydipsia, polyuria


-Brachial plexus inflammation


-Rectal Ca, carcinoid


-migraines





plan


continue abx


await for identification of the organism


repeat blood cx one bottle growing


will repeat blood culture

## 2017-03-31 NOTE — PN
Physical Exam: 


SUBJECTIVE: Patient seen and examined. States she is feeling tired and weak.


Has not slept, verbalizes feeling exhausted


Overnight notes reviewed








OBJECTIVE:


Lorazapem 1mg BID ordered for insomnia


Will order PT for weakness


 Vital Signs











 Period  Temp  Pulse  Resp  BP Sys/Bethea  Pulse Ox


 


 Last 24 Hr  97.7 F-99.8 F  74-96  18-20  118-130/59-72  95-97











GENERAL: The patient is awake, alert, and fully oriented, in no acute distress.


HEAD: Normal with no signs of trauma.


EYES: PERRL, extraocular movements intact, sclera anicteric, conjunctiva clear. 

No ptosis. 


ENT: Ears normal, nares patent, oropharynx clear without exudates, moist mucous 

membranes.


NECK: Trachea midline, full range of motion, supple. 


LUNGS: Breath sounds equal, clear to auscultation bilaterally, no wheezes, no 

crackles, no accessory muscle use. 


HEART: Regular rate and rhythm, S1, S2 without murmur, rub or gallop.


ABDOMEN: Soft, nontender, nondistended, normoactive bowel sounds, no guarding, 

no rebound, no hepatosplenomegaly, no masses.


EXTREMITIES: 2+ pulses, warm, well-perfused, no edema. 


NEUROLOGICAL:  Normal speech, gait not observed.


PSYCH: Normal mood, normal affect.


SKIN: Warm, dry, normal turgor, no rashes or lesions noted

















 Laboratory Results - last 24 hr











  03/31/17 03/31/17 03/31/17





  11:09 11:09 12:22


 


WBC  12.8 H  


 


RBC  3.01 L  


 


Hgb  9.0 L  


 


Hct  27.1 L  


 


MCV  90.2  


 


MCHC  33.2  


 


RDW  15.1  


 


Plt Count  79 L D  


 


MPV  8.9  


 


Neutrophils %  88.0 H  


 


Lymphocytes %  6.6 L  


 


Monocytes %  4.1  


 


Eosinophils %  0.8  


 


Basophils %  0.5  


 


Sodium   140 


 


Potassium   4.2 


 


Chloride   109 H 


 


Carbon Dioxide   22 


 


Anion Gap   9 


 


BUN   19 H 


 


Creatinine   1.0 


 


Creat Clearance w eGFR   56.37 


 


POC Glucometer    101


 


Random Glucose   115 H D 


 


Calcium   7.8 L 


 


Total Bilirubin   0.5  D 


 


AST   14 L 


 


ALT   30 


 


Alkaline Phosphatase   189 H 


 


Total Protein   5.9 L 


 


Albumin   2.1 L 








Active Medications











Generic Name Dose Route Start Last Admin





  Trade Name Freq  PRN Reason Stop Dose Admin


 


Acetaminophen  650 mg 03/27/17 21:00 03/31/17 15:00





  Tylenol -  PO   650 mg





  Q6H PRN   Administration





  FEVER OR PAIN   


 


Albuterol Sulfate  1 amp 03/28/17 18:00 03/31/17 17:28





  Ventolin 0.083% Nebulizer Soln -  NEB   Not Given





  QIDR RAJ   


 


Docusate Sodium  100 mg 03/27/17 22:02  





  Colace -  PO   





  BID PRN   





  CONSTIPATION   


 


Meropenem 1 gm/ Dextrose  100 mls @ 100 mls/hr 03/28/17 19:00 03/31/17 17:45





  IVPB   100 mls/hr





  Q8H-IV RAJ   Administration





  Protocol   


 


Insulin Aspart  1 vial 03/29/17 11:00 03/31/17 17:44





  Novolog Vial Sliding Scale -  SQ   Not Given





  ACHS RAJ   





  Protocol   


 


Lorazepam  1 mg 03/31/17 13:42  





  Ativan -  PO   





  BID PRN   





  ANXIETY   


 


Montelukast Sodium  10 mg 03/28/17 22:00 03/30/17 21:04





  Singulair -  PO   10 mg





  HS RAJ   Administration


 


Polyethylene Glycol  17 gm 03/29/17 00:15 03/30/17 21:05





  Miralax (For Daily Use) -  PO   Not Given





  HS RAJ   


 


Sodium Chloride  2 spray 03/28/17 05:08 03/28/17 21:39





  [place] Spray Nasal Spray -  NS   2 sprays





  TID PRN   Administration





  NASAL CONGESTION   


 


Topiramate  50 mg 03/28/17 10:00 03/31/17 10:25





  Topamax -  PO   50 mg





  DAILY RAJ   Administration











ASSESSMENT/PLAN:





Patient is a 61 year old female with PMHx of GERD, pre-diabetes, rectal 

carcinoid ca, migraines admitted with polydipsia, polyuria, intermittent fevers/

chills for 4 weeks.





ID:


Severe sepsis likely gram negative bacteremia and UTI - acute


Assessment/Plan: On Meropenem 1gm as per ID started on 3/28


blood cultures and urine cultures with proteus mirabilis


repeat blood culture pending organism


ID repeating blood cultures in a.m.


afebrile, hemodynamically stable


Monitor vitals


ID following


 


Hematology:


Thrombocytopenia - improving, 79,000 today


Assessment/Plan:  Likely secondary to sepsis


trending up, monitor, No signs of bleeding


No anticoags secondary to thrombocytopenia





Oncology:


Assessment/Plan:  Rectal Ca, carcinoid tumor


Octreotide monthly, follows up with specialist as outpatient





:


Acute Kidney Injury - resolved


Assessment/Plan:  likely secondary to dehydration


tolerating PO fluids, will stop IVF and monitor


IF worsening renal function in a.m. restart IVF





Endocrine:


Hyperglycemia - DM II - boderline


hmga1c 6.6


monitor BGMs BID (breakfast and dinner)


Was on prednisone taper - completed for brachial plexus inflammation





F.E.N.


Fluids: tolerating PO


Electrolytes: hyponatremia resolved, bmp in a.m.


Nutrition: tolerating diet





DVT px: SCDs, no chemical AC secondary to thrombocytopenia.  Full Code. 




















Visit type





- Emergency Visit


Emergency Visit: Yes


ED Registration Date: 03/27/17


Care time: The patient presented to the Emergency Department on the above date 

and was hospitalized for further evaluation of their emergent condition.





- New Patient


This patient is new to me today: No





- Critical Care


Critical Care patient: No





- Discharge Referral


Referred to [facility] No

## 2017-04-01 LAB
ALBUMIN SERPL-MCNC: 2.3 G/DL (ref 3.4–5)
ALP SERPL-CCNC: 166 U/L (ref 45–117)
ALT SERPL-CCNC: 27 U/L (ref 12–78)
ANION GAP SERPL CALC-SCNC: 8 MMOL/L (ref 8–16)
AST SERPL-CCNC: 9 U/L (ref 15–37)
BASOPHILS # BLD: 0.6 % (ref 0–2)
BILIRUB SERPL-MCNC: 0.5 MG/DL (ref 0.2–1)
CALCIUM SERPL-MCNC: 8.2 MG/DL (ref 8.5–10.1)
CO2 SERPL-SCNC: 25 MMOL/L (ref 21–32)
CREAT SERPL-MCNC: 0.8 MG/DL (ref 0.55–1.02)
DEPRECATED RDW RBC AUTO: 15.6 % (ref 11.6–15.6)
EOSINOPHIL # BLD: 1.7 % (ref 0–4.5)
GLUCOSE SERPL-MCNC: 140 MG/DL (ref 74–106)
MCH RBC QN AUTO: 30.3 PG (ref 25.7–33.7)
MCHC RBC AUTO-ENTMCNC: 33.4 G/DL (ref 32–36)
MCV RBC: 90.6 FL (ref 80–96)
NEUTROPHILS # BLD: 83.5 % (ref 42.8–82.8)
PLATELET # BLD AUTO: 127 K/MM3 (ref 134–434)
PMV BLD: 8.7 FL (ref 7.5–11.1)
PROT SERPL-MCNC: 6.3 G/DL (ref 6.4–8.2)
WBC # BLD AUTO: 9.1 K/MM3 (ref 4–10)

## 2017-04-01 RX ADMIN — INSULIN ASPART SCH: 100 INJECTION, SOLUTION INTRAVENOUS; SUBCUTANEOUS at 06:06

## 2017-04-01 RX ADMIN — MEROPENEM SCH MLS/HR: 1 INJECTION, POWDER, FOR SOLUTION INTRAVENOUS at 03:00

## 2017-04-01 RX ADMIN — TOPIRAMATE SCH MG: 25 TABLET, FILM COATED ORAL at 09:12

## 2017-04-01 RX ADMIN — POLYETHYLENE GLYCOL 3350 SCH: 17 POWDER, FOR SOLUTION ORAL at 21:22

## 2017-04-01 RX ADMIN — POLYETHYLENE GLYCOL 3350 SCH GM: 17 POWDER, FOR SOLUTION ORAL at 21:17

## 2017-04-01 RX ADMIN — ALBUTEROL SULFATE SCH: 2.5 SOLUTION RESPIRATORY (INHALATION) at 23:25

## 2017-04-01 RX ADMIN — MEROPENEM SCH MLS/HR: 1 INJECTION, POWDER, FOR SOLUTION INTRAVENOUS at 09:12

## 2017-04-01 RX ADMIN — ALBUTEROL SULFATE SCH AMP: 2.5 SOLUTION RESPIRATORY (INHALATION) at 23:24

## 2017-04-01 RX ADMIN — INSULIN ASPART SCH: 100 INJECTION, SOLUTION INTRAVENOUS; SUBCUTANEOUS at 11:01

## 2017-04-01 RX ADMIN — MEROPENEM SCH MLS/HR: 1 INJECTION, POWDER, FOR SOLUTION INTRAVENOUS at 18:58

## 2017-04-01 RX ADMIN — ALBUTEROL SULFATE SCH: 2.5 SOLUTION RESPIRATORY (INHALATION) at 11:30

## 2017-04-01 RX ADMIN — ALBUTEROL SULFATE SCH: 2.5 SOLUTION RESPIRATORY (INHALATION) at 06:15

## 2017-04-01 RX ADMIN — ACETAMINOPHEN PRN MG: 325 TABLET ORAL at 09:13

## 2017-04-01 RX ADMIN — MONTELUKAST SODIUM SCH MG: 10 TABLET, COATED ORAL at 21:17

## 2017-04-01 RX ADMIN — ALBUTEROL SULFATE SCH: 2.5 SOLUTION RESPIRATORY (INHALATION) at 18:02

## 2017-04-01 NOTE — PN
Progress Note, Physician


History of Present Illness: 


feeling much better


still weak





- Current Medication List


Current Medications: 


Active Medications





Acetaminophen (Tylenol -)  650 mg PO Q6H PRN


   PRN Reason: FEVER OR PAIN


   Last Admin: 04/01/17 09:13 Dose:  650 mg


Albuterol Sulfate (Ventolin 0.083% Nebulizer Soln -)  1 amp NEB QIDR [facility]


   Last Admin: 04/01/17 11:30 Dose:  Not Given


Docusate Sodium (Colace -)  100 mg PO BID PRN


   PRN Reason: CONSTIPATION


Meropenem 1 gm/ Dextrose  100 mls @ 100 mls/hr IVPB Q8H-IV RAJ


   PRN Reason: Protocol


   Last Admin: 04/01/17 09:12 Dose:  100 mls/hr


Lorazepam (Ativan -)  1 mg PO BID PRN


   PRN Reason: ANXIETY


   Last Admin: 03/31/17 21:56 Dose:  1 mg


Montelukast Sodium (Singulair -)  10 mg PO [facility]


   Last Admin: 03/31/17 21:56 Dose:  10 mg


Polyethylene Glycol (Miralax (For Daily Use) -)  17 gm PO [facility]


   Last Admin: 03/31/17 22:00 Dose:  Not Given


Sodium Chloride ([place] Spray Nasal Spray -)  2 spray NS TID PRN


   PRN Reason: NASAL CONGESTION


   Last Admin: 03/28/17 21:39 Dose:  2 sprays


Topiramate (Topamax -)  50 mg PO DAILY [facility]


   Last Admin: 04/01/17 09:12 Dose:  50 mg











- Objective


Vital Signs: 


 Vital Signs











Temperature  98.2 F   04/01/17 14:10


 


Pulse Rate  96 H  04/01/17 14:10


 


Respiratory Rate  20   04/01/17 14:10


 


Blood Pressure  111/57   04/01/17 14:10


 


O2 Sat by Pulse Oximetry (%)  97   04/01/17 10:00











Constitutional: Yes: No Distress, Calm


Cardiovascular: Yes: Regular Rate and Rhythm


Respiratory: Yes: Regular, CTA Bilaterally


Gastrointestinal: Yes: Normal Bowel Sounds, Soft


Musculoskeletal: Yes: WNL


Extremities: Yes: WNL


Neurological: Yes: Alert, Oriented


Psychiatric: Yes: Alert, Oriented


Labs: 


 CBC, BMP





 04/01/17 07:05 





 04/01/17 07:05 











Assessment/Plan


60 y/o F w/PMH of rectal ca, GERD, migraines, pre-diabetes presented to ER w/ c/

o polydipsia, polyuria, and intermittent fevers for 4 weeks. Admitted for 

severe sepsis secondary to UTI.





- sepsis secondary to UTI


-ADAM secondary to severe sepsis


-Thrombocytopenia 


-Abd pain 


-Hyperglycemia, polydipsia, polyuria


-Brachial plexus inflammation


-Rectal Ca, carcinoid


-migraines





plan


continue abx


repeat blood cx pending

## 2017-04-01 NOTE — PN
Progress Note (short form)





- Note


Progress Note: 


SUBJECTIVE: The patient was seen exercising at the bedside. She reports feeling 

good today. She denies any complaints at this time. 





 Current Medications











Generic Name Dose Route Start Last Admin





  Trade Name Yaya  PRN Reason Stop Dose Admin


 


Acetaminophen  650 mg 03/27/17 21:00 04/01/17 09:13





  Tylenol -  PO   650 mg





  Q6H PRN   Administration





  FEVER OR PAIN   


 


Albuterol Sulfate  1 amp 03/28/17 18:00 04/01/17 11:30





  Ventolin 0.083% Nebulizer Soln -  NEB   Not Given





  QIDR RAJ   


 


Docusate Sodium  100 mg 03/27/17 22:02  





  Colace -  PO   





  BID PRN   





  CONSTIPATION   


 


Meropenem 1 gm/ Dextrose  100 mls @ 100 mls/hr 03/28/17 19:00 04/01/17 09:12





  IVPB   100 mls/hr





  Q8H-IV RAJ   Administration





  Protocol   


 


Insulin Aspart  1 vial 03/29/17 11:00 04/01/17 11:01





  Novolog Vial Sliding Scale -  SQ   Not Given





  ACHS RAJ   





  Protocol   


 


Lorazepam  1 mg 03/31/17 13:42 03/31/17 21:56





  Ativan -  PO   1 mg





  BID PRN   Administration





  ANXIETY   


 


Montelukast Sodium  10 mg 03/28/17 22:00 03/31/17 21:56





  Singulair -  PO   10 mg





  HS RAJ   Administration


 


Polyethylene Glycol  17 gm 03/29/17 00:15 03/31/17 22:00





  Miralax (For Daily Use) -  PO   Not Given





  HS RAJ   


 


Sodium Chloride  2 spray 03/28/17 05:08 03/28/17 21:39





  [place] Spray Nasal Spray -  NS   2 sprays





  TID PRN   Administration





  NASAL CONGESTION   


 


Topiramate  50 mg 03/28/17 10:00 04/01/17 09:12





  Topamax -  PO   50 mg





  DAILY RAJ   Administration








OBJECTIVE:





 


 Vital Signs











 Period  Temp  Pulse  Resp  BP Sys/Bethea  Pulse Ox


 


 Last 24 Hr  97.9 F-99.1 F  79-91  18-20  114-135/62-82  96








Physical Exam: 


General: NAD, A&Ox3


Lungs: CTA bilaterally


Heart: RRR, S1S2


Abd: Soft, non-tender, non-distended. Normoactive bowel sounds


Ext: Warm, well-perfused. 2+ DP/PT bilaterally


Neuro: CN 2-12 intact








 CBCD











WBC  9.1 K/mm3 (4.0-10.0)   04/01/17  07:05    


 


RBC  3.14 M/mm3 (3.60-5.2)  L  04/01/17  07:05    


 


Hgb  9.5 GM/dL (10.7-15.3)  L  04/01/17  07:05    


 


Hct  28.4 % (32.4-45.2)  L  04/01/17  07:05    


 


MCV  90.6 fl (80-96)   04/01/17  07:05    


 


MCHC  33.4 g/dl (32.0-36.0)   04/01/17  07:05    


 


RDW  15.6 % (11.6-15.6)   04/01/17  07:05    


 


Plt Count  127 K/MM3 (134-434)  L D 04/01/17  07:05    


 


MPV  8.7 fl (7.5-11.1)   04/01/17  07:05    








 CMP











Sodium  142 mmol/L (136-145)   04/01/17  07:05    


 


Potassium  3.8 mmol/L (3.5-5.1)   04/01/17  07:05    


 


Chloride  109 mmol/L ()  H  04/01/17  07:05    


 


Carbon Dioxide  25 mmol/L (21-32)   04/01/17  07:05    


 


Anion Gap  8  (8-16)   04/01/17  07:05    


 


BUN  17 mg/dL (7-18)   04/01/17  07:05    


 


Creatinine  0.8 mg/dL (0.55-1.02)   04/01/17  07:05    


 


Creat Clearance w eGFR  > 60  (>60)   04/01/17  07:05    


 


Random Glucose  140 mg/dL ()  H D 04/01/17  07:05    


 


Calcium  8.2 mg/dL (8.5-10.1)  L  04/01/17  07:05    


 


Total Bilirubin  0.5 mg/dL (0.2-1.0)   04/01/17  07:05    


 


AST  9 U/L (15-37)  L D 04/01/17  07:05    


 


ALT  27 U/L (12-78)   04/01/17  07:05    


 


Alkaline Phosphatase  166 U/L ()  H  04/01/17  07:05    


 


Total Protein  6.3 g/dl (6.4-8.2)  L  04/01/17  07:05    


 


Albumin  2.3 g/dl (3.4-5.0)  L  04/01/17  07:05    








 CARDIAC ENZYMES











Creatine Kinase  19 IU/L ()  L  03/27/17  12:30    


 


Troponin I  < 0.03 ng/ml (0.03-0.50)  L  03/27/17  12:30    











 Microbiology





03/31/17 11:15   Urine - Urine Clean Catch   Urine Culture - Final


                            NO GROWTH OBTAINED


03/29/17 07:06   Blood - Peripheral Venous   Blood Culture - Final


                            Proteus Mirabilis


03/29/17 05:42   Blood - Peripheral Venous   Blood Culture - Preliminary


                            NO GROWTH OBTAINED AFTER 72 HOURS, INCUBATION TO 

CONTINUE


                            FOR 2 DAYS.


03/27/17 13:25   Blood - Peripheral Venous   Blood Culture - Final


                            Proteus Mirabilis


03/27/17 13:30   Blood - Peripheral Venous   Blood Culture - Final


                            Proteus Mirabilis


03/27/17 13:25   Urine - Urine Clean Catch   Urine Culture - Final


                            Proteus Mirabilis


03/27/17 12:25   Nasopharyngeal Swab   Respiratory Virus Panel - Preliminary


03/27/17 12:25   Nasopharyngeal Swab   Influenza Types A,B Antigen (TRICIA) - Final


03/27/17 12:25   Nasopharyngeal Swab    - Final








Assessment: This is a 61 year old female with PMHx of GERD, prediabetes, rectal 

carcinoid ca, migraines admitted with polydipsia, polyuria, intermittent fevers/

chills for 4 weeks, treated with tamiflu. 





Plan: 


1) ID: Severe sepsis 2/2 proteus mirabilis bacteremia, UTI


- Continue Meropenem (3/28- )


- F/u repeat blood cultures this AM as one out of two vials positive for 

proteus mirabilis on 3/29


- WBC wnl


- Afebrile


- Appreciate ID consult





2) Endocrine: Hyperglycemia


- Improving after prednisone taper completed


- Patient refusing fingersticks


- Hgb A1c 6.6


- Diabetic diet and teaching





3) Nephrology: ADAM


- Resolved





4) Heme: Thrombocytopenia


- Likely 2/2 sepsis


- Continues to improve 79->127





5) Onc: rectal cancer


- Octreotide monthly, follows up with specialist as outpatient





6) Neuro: Migraines


- Continue Topamax





7) F/E/N:


- Diabetic diet


- Monitor electrolytes





8) Prophylaxis: 


- SCDs bilaterally


- OOB ambulating





9) Dispo:


- Requires continued inpatient care





CODE STATUS: FULL CODE





Visit type





- Emergency Visit


Emergency Visit: Yes


ED Registration Date: 03/27/17


Care time: The patient presented to the Emergency Department on the above date 

and was hospitalized for further evaluation of their emergent condition.





- New Patient


This patient is new to me today: Yes


Date on this admission: 04/01/17





- Critical Care


Critical Care patient: No

## 2017-04-02 LAB
ALBUMIN SERPL-MCNC: 2.1 G/DL (ref 3.4–5)
ALP SERPL-CCNC: 137 U/L (ref 45–117)
ALT SERPL-CCNC: 20 U/L (ref 12–78)
ANION GAP SERPL CALC-SCNC: 9 MMOL/L (ref 8–16)
AST SERPL-CCNC: 6 U/L (ref 15–37)
BILIRUB SERPL-MCNC: 0.4 MG/DL (ref 0.2–1)
CALCIUM SERPL-MCNC: 8.1 MG/DL (ref 8.5–10.1)
CO2 SERPL-SCNC: 24 MMOL/L (ref 21–32)
CREAT SERPL-MCNC: 0.8 MG/DL (ref 0.55–1.02)
DEPRECATED RDW RBC AUTO: 15.5 % (ref 11.6–15.6)
GLUCOSE SERPL-MCNC: 130 MG/DL (ref 74–106)
MCH RBC QN AUTO: 30.2 PG (ref 25.7–33.7)
MCHC RBC AUTO-ENTMCNC: 33.5 G/DL (ref 32–36)
MCV RBC: 90.1 FL (ref 80–96)
PLATELET # BLD AUTO: 161 K/MM3 (ref 134–434)
PMV BLD: 8.2 FL (ref 7.5–11.1)
PROT SERPL-MCNC: 5.8 G/DL (ref 6.4–8.2)
WBC # BLD AUTO: 6.4 K/MM3 (ref 4–10)

## 2017-04-02 RX ADMIN — ALBUTEROL SULFATE SCH: 2.5 SOLUTION RESPIRATORY (INHALATION) at 17:40

## 2017-04-02 RX ADMIN — ALBUTEROL SULFATE SCH: 2.5 SOLUTION RESPIRATORY (INHALATION) at 06:18

## 2017-04-02 RX ADMIN — ALBUTEROL SULFATE SCH: 2.5 SOLUTION RESPIRATORY (INHALATION) at 11:41

## 2017-04-02 RX ADMIN — MONTELUKAST SODIUM SCH MG: 10 TABLET, COATED ORAL at 20:59

## 2017-04-02 RX ADMIN — MEROPENEM SCH MLS/HR: 1 INJECTION, POWDER, FOR SOLUTION INTRAVENOUS at 17:14

## 2017-04-02 RX ADMIN — MEROPENEM SCH MLS/HR: 1 INJECTION, POWDER, FOR SOLUTION INTRAVENOUS at 01:44

## 2017-04-02 RX ADMIN — ACETAMINOPHEN PRN MG: 325 TABLET ORAL at 14:02

## 2017-04-02 RX ADMIN — TOPIRAMATE SCH MG: 25 TABLET, FILM COATED ORAL at 10:20

## 2017-04-02 RX ADMIN — POLYETHYLENE GLYCOL 3350 SCH: 17 POWDER, FOR SOLUTION ORAL at 21:00

## 2017-04-02 RX ADMIN — MEROPENEM SCH MLS/HR: 1 INJECTION, POWDER, FOR SOLUTION INTRAVENOUS at 10:20

## 2017-04-02 NOTE — PN
Progress Note, Physician


History of Present Illness: 


says she is better


but extremely weak


no other issues








- Current Medication List


Current Medications: 


Active Medications





Acetaminophen (Tylenol -)  650 mg PO Q6H PRN


   PRN Reason: FEVER OR PAIN


   Last Admin: 04/02/17 14:02 Dose:  650 mg


Albuterol Sulfate (Ventolin 0.083% Nebulizer Soln -)  1 amp NEB QIDR [facility]


   Last Admin: 04/02/17 11:41 Dose:  Not Given


Docusate Sodium (Colace -)  100 mg PO BID PRN


   PRN Reason: CONSTIPATION


Meropenem 1 gm/ Dextrose  100 mls @ 100 mls/hr IVPB Q8H-IV RAJ


   PRN Reason: Protocol


   Last Admin: 04/02/17 17:14 Dose:  100 mls/hr


Lorazepam (Ativan -)  1 mg PO BID PRN


   PRN Reason: ANXIETY


   Last Admin: 04/01/17 21:23 Dose:  1 mg


Montelukast Sodium (Singulair -)  10 mg PO [facility]


   Last Admin: 04/01/17 21:17 Dose:  10 mg


Polyethylene Glycol (Miralax (For Daily Use) -)  17 gm PO [facility]


   Last Admin: 04/01/17 21:22 Dose:  Not Given


Sodium Chloride ([place] Spray Nasal Spray -)  2 spray NS TID PRN


   PRN Reason: NASAL CONGESTION


   Last Admin: 03/28/17 21:39 Dose:  2 sprays


Topiramate (Topamax -)  50 mg PO DAILY [facility]


   Last Admin: 04/02/17 10:20 Dose:  50 mg











- Objective


Vital Signs: 


 Vital Signs











Temperature  98.4 F   04/02/17 17:36


 


Pulse Rate  84   04/02/17 17:36


 


Respiratory Rate  20   04/02/17 17:36


 


Blood Pressure  120/64   04/02/17 17:36


 


O2 Sat by Pulse Oximetry (%)  98   04/02/17 10:00











Constitutional: Yes: No Distress, Calm


HENT: Yes: Atraumatic


Cardiovascular: Yes: Regular Rate and Rhythm


Respiratory: Yes: Regular, CTA Bilaterally


Gastrointestinal: Yes: Normal Bowel Sounds, Soft


Musculoskeletal: Yes: WNL


Extremities: Yes: WNL


Neurological: Yes: Alert, Oriented


Psychiatric: Yes: Alert, Oriented


Labs: 


 CBC, BMP





 04/02/17 06:30 





 04/02/17 06:30 











Assessment/Plan


60 y/o F w/PMH of rectal ca, GERD, migraines, pre-diabetes presented to ER w/ c/

o polydipsia, polyuria, and intermittent fevers for 4 weeks. Admitted for 

severe sepsis secondary to UTI.





- sepsis secondary to UTI


-ADAM secondary to severe sepsis


-Thrombocytopenia 


-Abd pain 


-Hyperglycemia, polydipsia, polyuria


-Brachial plexus inflammation


-Rectal Ca, carcinoid


-migraines





plan


continue abx


repeat blood cx negative so far

## 2017-04-02 NOTE — PN
Progress Note (short form)





- Note


Progress Note: 


SUBJECTIVE: The patient was seen and examined at the bedside. She reports 

feeling tired today, she did not sleep well last night


Most recent blood cultures with NGTD 





 


 Current Medications











Generic Name Dose Route Start Last Admin





  Trade Name Yaya  PRN Reason Stop Dose Admin


 


Acetaminophen  650 mg 03/27/17 21:00 04/01/17 09:13





  Tylenol -  PO   650 mg





  Q6H PRN   Administration





  FEVER OR PAIN   


 


Albuterol Sulfate  1 amp 03/28/17 18:00 04/02/17 11:41





  Ventolin 0.083% Nebulizer Soln -  NEB   Not Given





  QIDR RAJ   


 


Docusate Sodium  100 mg 03/27/17 22:02  





  Colace -  PO   





  BID PRN   





  CONSTIPATION   


 


Meropenem 1 gm/ Dextrose  100 mls @ 100 mls/hr 03/28/17 19:00 04/02/17 10:20





  IVPB   100 mls/hr





  Q8H-IV RAJ   Administration





  Protocol   


 


Lorazepam  1 mg 03/31/17 13:42 04/01/17 21:23





  Ativan -  PO   1 mg





  BID PRN   Administration





  ANXIETY   


 


Montelukast Sodium  10 mg 03/28/17 22:00 04/01/17 21:17





  Singulair -  PO   10 mg





  HS RAJ   Administration


 


Polyethylene Glycol  17 gm 03/29/17 00:15 04/01/17 21:22





  Miralax (For Daily Use) -  PO   Not Given





  HS RAJ   


 


Sodium Chloride  2 spray 03/28/17 05:08 03/28/17 21:39





  [place] Spray Nasal Spray -  NS   2 sprays





  TID PRN   Administration





  NASAL CONGESTION   


 


Topiramate  50 mg 03/28/17 10:00 04/02/17 10:20





  Topamax -  PO   50 mg





  DAILY RAJ   Administration








OBJECTIVE:








 Vital Signs











 Period  Temp  Pulse  Resp  BP Sys/Bethea  Pulse Ox


 


 Last 24 Hr  98.0 F-99.2 F  79-96  18-20  /55-74  98








Physical Exam: 


General: NAD, A&Ox3


Lungs: CTA bilaterally


Heart: RRR, S1S2


Abd: Soft, non-tender, non-distended. Normoactive bowel sounds


Ext: Warm, well-perfused. 2+ DP/PT bilaterally


Neuro: CN 2-12 intact








 CBCD











WBC  6.4 K/mm3 (4.0-10.0)   04/02/17  06:30    


 


RBC  2.94 M/mm3 (3.60-5.2)  L  04/02/17  06:30    


 


Hgb  8.9 GM/dL (10.7-15.3)  L  04/02/17  06:30    


 


Hct  26.5 % (32.4-45.2)  L  04/02/17  06:30    


 


MCV  90.1 fl (80-96)   04/02/17  06:30    


 


MCHC  33.5 g/dl (32.0-36.0)   04/02/17  06:30    


 


RDW  15.5 % (11.6-15.6)   04/02/17  06:30    


 


Plt Count  161 K/MM3 (134-434)  D 04/02/17  06:30    


 


MPV  8.2 fl (7.5-11.1)   04/02/17  06:30    








 CMP











Sodium  143 mmol/L (136-145)   04/02/17  06:30    


 


Potassium  4.0 mmol/L (3.5-5.1)   04/02/17  06:30    


 


Chloride  110 mmol/L ()  H  04/02/17  06:30    


 


Carbon Dioxide  24 mmol/L (21-32)   04/02/17  06:30    


 


Anion Gap  9  (8-16)   04/02/17  06:30    


 


BUN  18 mg/dL (7-18)   04/02/17  06:30    


 


Creatinine  0.8 mg/dL (0.55-1.02)   04/02/17  06:30    


 


Creat Clearance w eGFR  > 60  (>60)   04/02/17  06:30    


 


Random Glucose  130 mg/dL ()  H  04/02/17  06:30    


 


Calcium  8.1 mg/dL (8.5-10.1)  L  04/02/17  06:30    


 


Total Bilirubin  0.4 mg/dL (0.2-1.0)   04/02/17  06:30    


 


AST  6 U/L (15-37)  L D 04/02/17  06:30    


 


ALT  20 U/L (12-78)  D 04/02/17  06:30    


 


Alkaline Phosphatase  137 U/L ()  H  04/02/17  06:30    


 


Total Protein  5.8 g/dl (6.4-8.2)  L  04/02/17  06:30    


 


Albumin  2.1 g/dl (3.4-5.0)  L  04/02/17  06:30    








 CARDIAC ENZYMES











Creatine Kinase  19 IU/L ()  L  03/27/17  12:30    


 


Troponin I  < 0.03 ng/ml (0.03-0.50)  L  03/27/17  12:30    











 Microbiology





03/29/17 05:42   Blood - Peripheral Venous   Blood Culture - Preliminary


                            NO GROWTH OBTAINED AFTER 96 HOURS, INCUBATION TO 

CONTINUE


                            FOR 1 DAYS.


04/01/17 07:10   Blood - Peripheral Venous   Blood Culture - Preliminary


                            NO GROWTH OBTAINED AFTER 24 HOURS, INCUBATION TO 

CONTINUE


                            FOR 4 DAYS.


04/01/17 07:05   Blood - Peripheral Venous   Blood Culture - Preliminary


                            NO GROWTH OBTAINED AFTER 24 HOURS, INCUBATION TO 

CONTINUE


                            FOR 4 DAYS.


03/31/17 11:15   Urine - Urine Clean Catch   Urine Culture - Final


                            NO GROWTH OBTAINED


03/29/17 07:06   Blood - Peripheral Venous   Blood Culture - Final


                            Proteus Mirabilis


03/27/17 13:25   Blood - Peripheral Venous   Blood Culture - Final


                            Proteus Mirabilis


03/27/17 13:30   Blood - Peripheral Venous   Blood Culture - Final


                            Proteus Mirabilis


03/27/17 13:25   Urine - Urine Clean Catch   Urine Culture - Final


                            Proteus Mirabilis


03/27/17 12:25   Nasopharyngeal Swab   Respiratory Virus Panel - Preliminary


03/27/17 12:25   Nasopharyngeal Swab   Influenza Types A,B Antigen (TRICIA) - Final


03/27/17 12:25   Nasopharyngeal Swab    - Final








Assessment: This is a 61 year old female with PMHx of GERD, prediabetes, rectal 

carcinoid ca, migraines admitted with polydipsia, polyuria, intermittent fevers/

chills for 4 weeks, treated with tamiflu. 





Plan: 


1) ID: Severe sepsis 2/2 proteus mirabilis bacteremia, UTI


- Continue Meropenem (3/28- )


- Most recent blood cultures with NGTD


- WBC wnl


- Afebrile


- Awaiting final ID recommendation for duration 


- Appreciate ID consult





2) Endocrine: Hyperglycemia


- Improving after prednisone taper completed


- Patient refusing fingersticks


- Hgb A1c 6.6


- Diabetic diet and teaching





3) Nephrology: ADAM


- Resolved





4) Heme: Thrombocytopenia


- Likely 2/2 sepsis


- Resolved





5) Onc: rectal cancer


- Octreotide monthly, follows up with specialist as outpatient





6) Neuro: Migraines


- Continue Topamax





7) F/E/N:


- Diabetic diet


- Monitor electrolytes





8) Prophylaxis: 


- SCDs bilaterally


- OOB ambulating





9) Dispo:


- Requires continued inpatient care





CODE STATUS: FULL CODE





Visit type





- Emergency Visit


Emergency Visit: Yes


ED Registration Date: 03/27/17


Care time: The patient presented to the Emergency Department on the above date 

and was hospitalized for further evaluation of their emergent condition.





- New Patient


This patient is new to me today: No





- Critical Care


Critical Care patient: No

## 2017-04-03 RX ADMIN — MEROPENEM SCH MLS/HR: 1 INJECTION, POWDER, FOR SOLUTION INTRAVENOUS at 10:07

## 2017-04-03 RX ADMIN — MEROPENEM SCH MLS/HR: 1 INJECTION, POWDER, FOR SOLUTION INTRAVENOUS at 19:51

## 2017-04-03 RX ADMIN — SALINE NASAL SPRAY PRN SPRAYS: 1.5 SOLUTION NASAL at 10:10

## 2017-04-03 RX ADMIN — POLYETHYLENE GLYCOL 3350 SCH: 17 POWDER, FOR SOLUTION ORAL at 21:24

## 2017-04-03 RX ADMIN — MONTELUKAST SODIUM SCH MG: 10 TABLET, COATED ORAL at 21:24

## 2017-04-03 RX ADMIN — TOPIRAMATE SCH MG: 25 TABLET, FILM COATED ORAL at 10:09

## 2017-04-03 RX ADMIN — MEROPENEM SCH MLS/HR: 1 INJECTION, POWDER, FOR SOLUTION INTRAVENOUS at 01:00

## 2017-04-03 NOTE — PN
Progress Note, Physician


History of Present Illness: 


patient doing better


no new issues


starting to feel the strength coming back


was able to walk today





- Current Medication List


Current Medications: 


Active Medications





Acetaminophen (Tylenol -)  650 mg PO Q6H PRN


   PRN Reason: FEVER OR PAIN


   Last Admin: 04/02/17 14:02 Dose:  650 mg


Docusate Sodium (Colace -)  100 mg PO BID PRN


   PRN Reason: CONSTIPATION


Meropenem 1 gm/ Dextrose  100 mls @ 100 mls/hr IVPB Q8H-IV RAJ


   PRN Reason: Protocol


   Last Admin: 04/03/17 10:07 Dose:  100 mls/hr


Montelukast Sodium (Singulair -)  10 mg PO HS [facility]


   Last Admin: 04/02/17 20:59 Dose:  10 mg


Polyethylene Glycol (Miralax (For Daily Use) -)  17 gm PO HS [facility]


   Last Admin: 04/02/17 21:00 Dose:  Not Given


Sodium Chloride (Ocean Spray Nasal Spray -)  2 spray NS TID PRN


   PRN Reason: NASAL CONGESTION


   Last Admin: 04/03/17 10:10 Dose:  2 sprays


Topiramate (Topamax -)  50 mg PO DAILY [facility]


   Last Admin: 04/03/17 10:09 Dose:  50 mg











- Objective


Vital Signs: 


 Vital Signs











Temperature  97.8 F   04/03/17 06:00


 


Pulse Rate  78   04/03/17 06:00


 


Respiratory Rate  20   04/03/17 06:00


 


Blood Pressure  112/62   04/03/17 06:00


 


O2 Sat by Pulse Oximetry (%)  96   04/02/17 21:00











Constitutional: Yes: No Distress, Calm


Eyes: Yes: Conjunctiva Clear


HENT: Yes: Atraumatic


Cardiovascular: Yes: Regular Rate and Rhythm


Respiratory: Yes: Regular, CTA Bilaterally


Gastrointestinal: Yes: Normal Bowel Sounds, Soft


Musculoskeletal: Yes: WNL


Extremities: Yes: WNL


Neurological: Yes: Alert, Oriented


Psychiatric: Yes: Alert


Labs: 


 CBC, BMP





 04/02/17 06:30 





 04/02/17 06:30 











Assessment/Plan


62 y/o F w/PMH of rectal ca, GERD, migraines, pre-diabetes presented to ER w/ c/

o polydipsia, polyuria, and intermittent fevers for 4 weeks. Admitted for 

severe sepsis secondary to UTI.





- sepsis secondary to UTI


-ADAM secondary to severe sepsis


-Thrombocytopenia 


-Abd pain 


-Hyperglycemia, polydipsia, polyuria


-Brachial plexus inflammation


-Rectal Ca, carcinoid


-migraines





plan


continue abx


repeat blood cx negative so far


patient will need to complete abx till the 5th and morning dose of 6th


patient can then be discharged with no abx

## 2017-04-03 NOTE — PN
Progress Note (short form)





- Note


Progress Note: 


SUBJECTIVE: The patient was seen and examined at the bedside. 


Per ID will require several more days of IV abx





 Current Medications











Generic Name Dose Route Start Last Admin





  Trade Name Yaya  PRN Reason Stop Dose Admin


 


Acetaminophen  650 mg 03/27/17 21:00 04/02/17 14:02





  Tylenol -  PO   650 mg





  Q6H PRN   Administration





  FEVER OR PAIN   


 


Docusate Sodium  100 mg 03/27/17 22:02  





  Colace -  PO   





  BID PRN   





  CONSTIPATION   


 


Meropenem 1 gm/ Dextrose  100 mls @ 100 mls/hr 03/28/17 19:00 04/03/17 01:00





  IVPB   100 mls/hr





  Q8H-IV RAJ   Administration





  Protocol   


 


Lorazepam  1 mg 03/31/17 13:42 04/02/17 20:59





  Ativan -  PO   1 mg





  BID PRN   Administration





  ANXIETY   


 


Montelukast Sodium  10 mg 03/28/17 22:00 04/02/17 20:59





  Singulair -  PO   10 mg





  HS RAJ   Administration


 


Polyethylene Glycol  17 gm 03/29/17 00:15 04/02/17 21:00





  Miralax (For Daily Use) -  PO   Not Given





  HS ARJ   


 


Sodium Chloride  2 spray 03/28/17 05:08 03/28/17 21:39





  [place] Spray Nasal Spray -  NS   2 sprays





  TID PRN   Administration





  NASAL CONGESTION   


 


Topiramate  50 mg 03/28/17 10:00 04/02/17 10:20





  Topamax -  PO   50 mg





  DAILY RAJ   Administration








OBJECTIVE:








 


 Vital Signs











 Period  Temp  Pulse  Resp  BP Sys/Bethea  Pulse Ox


 


 Last 24 Hr  97.8 F-98.4 F  77-98  18-20  103-120/61-74  96-98








Physical Exam: 


General: NAD, A&Ox3


Lungs: CTA bilaterally


Heart: RRR, S1S2


Abd: Soft, non-tender, non-distended. Normoactive bowel sounds


Ext: Warm, well-perfused. 2+ DP/PT bilaterally


Neuro: CN 2-12 intact





 CBCD











WBC  6.4 K/mm3 (4.0-10.0)   04/02/17  06:30    


 


RBC  2.94 M/mm3 (3.60-5.2)  L  04/02/17  06:30    


 


Hgb  8.9 GM/dL (10.7-15.3)  L  04/02/17  06:30    


 


Hct  26.5 % (32.4-45.2)  L  04/02/17  06:30    


 


MCV  90.1 fl (80-96)   04/02/17  06:30    


 


MCHC  33.5 g/dl (32.0-36.0)   04/02/17  06:30    


 


RDW  15.5 % (11.6-15.6)   04/02/17  06:30    


 


Plt Count  161 K/MM3 (134-434)  D 04/02/17  06:30    


 


MPV  8.2 fl (7.5-11.1)   04/02/17  06:30    








 CMP











Sodium  143 mmol/L (136-145)   04/02/17  06:30    


 


Potassium  4.0 mmol/L (3.5-5.1)   04/02/17  06:30    


 


Chloride  110 mmol/L ()  H  04/02/17  06:30    


 


Carbon Dioxide  24 mmol/L (21-32)   04/02/17  06:30    


 


Anion Gap  9  (8-16)   04/02/17  06:30    


 


BUN  18 mg/dL (7-18)   04/02/17  06:30    


 


Creatinine  0.8 mg/dL (0.55-1.02)   04/02/17  06:30    


 


Creat Clearance w eGFR  > 60  (>60)   04/02/17  06:30    


 


Random Glucose  130 mg/dL ()  H  04/02/17  06:30    


 


Calcium  8.1 mg/dL (8.5-10.1)  L  04/02/17  06:30    


 


Total Bilirubin  0.4 mg/dL (0.2-1.0)   04/02/17  06:30    


 


AST  6 U/L (15-37)  L D 04/02/17  06:30    


 


ALT  20 U/L (12-78)  D 04/02/17  06:30    


 


Alkaline Phosphatase  137 U/L ()  H  04/02/17  06:30    


 


Total Protein  5.8 g/dl (6.4-8.2)  L  04/02/17  06:30    


 


Albumin  2.1 g/dl (3.4-5.0)  L  04/02/17  06:30    








 CARDIAC ENZYMES











Creatine Kinase  19 IU/L ()  L  03/27/17  12:30    


 


Troponin I  < 0.03 ng/ml (0.03-0.50)  L  03/27/17  12:30    








 Microbiology





03/29/17 05:42   Blood - Peripheral Venous   Blood Culture - Preliminary


                            Proteus Species


04/01/17 07:10   Blood - Peripheral Venous   Blood Culture - Preliminary


                            NO GROWTH OBTAINED AFTER 48 HOURS, INCUBATION TO 

CONTINUE


                            FOR 3 DAYS.


04/01/17 07:05   Blood - Peripheral Venous   Blood Culture - Preliminary


                            NO GROWTH OBTAINED AFTER 48 HOURS, INCUBATION TO 

CONTINUE


                            FOR 3 DAYS.


03/31/17 11:15   Urine - Urine Clean Catch   Urine Culture - Final


                            NO GROWTH OBTAINED


03/29/17 07:06   Blood - Peripheral Venous   Blood Culture - Final


                            Proteus Mirabilis


03/27/17 13:25   Blood - Peripheral Venous   Blood Culture - Final


                            Proteus Mirabilis


03/27/17 13:30   Blood - Peripheral Venous   Blood Culture - Final


                            Proteus Mirabilis


03/27/17 13:25   Urine - Urine Clean Catch   Urine Culture - Final


                            Proteus Mirabilis


03/27/17 12:25   Nasopharyngeal Swab   Respiratory Virus Panel - Preliminary


03/27/17 12:25   Nasopharyngeal Swab   Influenza Types A,B Antigen (TRICIA) - Final


03/27/17 12:25   Nasopharyngeal Swab    - Final








Assessment: This is a 61 year old female with PMHx of GERD, prediabetes, rectal 

carcinoid ca, migraines admitted with polydipsia, polyuria, intermittent fevers/

chills for 4 weeks, treated with tamiflu. 





Plan: 


1) ID: Severe sepsis 2/2 proteus mirabilis bacteremia, UTI


- Continue Meropenem (3/28- )


- Most recent blood cultures with NGTD


- WBC wnl


- Afebrile


- Awaiting final ID recommendation for duration 


- Appreciate ID consult





2) Endocrine: Hyperglycemia


- Improving after prednisone taper completed


- Patient refusing fingersticks


- Hgb A1c 6.6


- Diabetic diet and teaching





3) Nephrology: ADAM


- Resolved





4) Heme: Thrombocytopenia


- Likely 2/2 sepsis


- Resolved





5) Onc: rectal cancer


- Octreotide monthly, follows up with specialist as outpatient





6) Neuro: Migraines


- Continue Topamax





7) F/E/N:


- Diabetic diet


- Monitor electrolytes





8) Prophylaxis: 


- SCDs bilaterally


- OOB ambulating





9) Dispo:


- Requires continued inpatient care





CODE STATUS: FULL CODE





Visit type





- Emergency Visit


Emergency Visit: Yes


ED Registration Date: 03/27/17


Care time: The patient presented to the Emergency Department on the above date 

and was hospitalized for further evaluation of their emergent condition.





- New Patient


This patient is new to me today: No





- Critical Care


Critical Care patient: No

## 2017-04-04 LAB
ALBUMIN SERPL-MCNC: 2.4 G/DL (ref 3.4–5)
ALP SERPL-CCNC: 127 U/L (ref 45–117)
ALT SERPL-CCNC: 16 U/L (ref 12–78)
ANION GAP SERPL CALC-SCNC: 10 MMOL/L (ref 8–16)
AST SERPL-CCNC: 8 U/L (ref 15–37)
BILIRUB SERPL-MCNC: 0.4 MG/DL (ref 0.2–1)
CALCIUM SERPL-MCNC: 8.4 MG/DL (ref 8.5–10.1)
CO2 SERPL-SCNC: 25 MMOL/L (ref 21–32)
CREAT SERPL-MCNC: 0.8 MG/DL (ref 0.55–1.02)
DEPRECATED RDW RBC AUTO: 15.6 % (ref 11.6–15.6)
GLUCOSE SERPL-MCNC: 120 MG/DL (ref 74–106)
MCH RBC QN AUTO: 30.4 PG (ref 25.7–33.7)
MCHC RBC AUTO-ENTMCNC: 33.3 G/DL (ref 32–36)
MCV RBC: 91.2 FL (ref 80–96)
PLATELET # BLD AUTO: 253 K/MM3 (ref 134–434)
PMV BLD: 7.6 FL (ref 7.5–11.1)
PROT SERPL-MCNC: 6.6 G/DL (ref 6.4–8.2)
WBC # BLD AUTO: 5.6 K/MM3 (ref 4–10)

## 2017-04-04 RX ADMIN — POLYETHYLENE GLYCOL 3350 SCH: 17 POWDER, FOR SOLUTION ORAL at 21:20

## 2017-04-04 RX ADMIN — MEROPENEM SCH MLS/HR: 1 INJECTION, POWDER, FOR SOLUTION INTRAVENOUS at 11:49

## 2017-04-04 RX ADMIN — TOPIRAMATE SCH MG: 25 TABLET, FILM COATED ORAL at 11:49

## 2017-04-04 RX ADMIN — MONTELUKAST SODIUM SCH MG: 10 TABLET, COATED ORAL at 21:20

## 2017-04-04 RX ADMIN — MEROPENEM SCH MLS/HR: 1 INJECTION, POWDER, FOR SOLUTION INTRAVENOUS at 02:09

## 2017-04-04 RX ADMIN — SALINE NASAL SPRAY PRN SPRAYS: 1.5 SOLUTION NASAL at 17:36

## 2017-04-04 RX ADMIN — MEROPENEM SCH MLS/HR: 1 INJECTION, POWDER, FOR SOLUTION INTRAVENOUS at 17:36

## 2017-04-04 NOTE — PN
Physical Exam: 


SUBJECTIVE: Patient seen and examined. No acute complaints. Pt reports feeling 

tired at present but did ambulate on the unit this AM without incident.








OBJECTIVE:





Vital Signs - 24 hr





 3





  04/03/17 04/03/17 04/03/17





  18:35 21:00 22:00


 


Temperature 98.1 F  98.2 F


 


Pulse Rate 81  81


 


Respiratory 18 20 16





Rate   


 


Blood Pressure 108/61  108/67








 3





  04/04/17 04/04/17 04/04/17





  02:00 06:00 15:00


 


Temperature 98.1 F 98.2 F 98.2 F


 


Pulse Rate 74 73 80


 


Respiratory 20 20 18





Rate   


 


Blood Pressure 97/56 102/60 107/56








GENERAL: The patient is awake, alert, and fully oriented, in no acute distress.


HEAD: Normal with no signs of trauma.


EYES: PERRL, extraocular movements intact, sclera anicteric, conjunctiva clear. 

No ptosis. 


ENT: Ears normal, nares patent, oropharynx clear without exudates, moist mucous 


membranes.


NECK: Trachea midline, full range of motion, supple. 


LUNGS: Breath sounds equal, clear to auscultation bilaterally, no wheezes, no 

crackles, no 


accessory muscle use. 


HEART: Regular rate and rhythm, S1, S2 without murmur, rub or gallop.


ABDOMEN: Soft, nontender, nondistended, normoactive bowel sounds, no guarding, 

no 


rebound, no hepatosplenomegaly, no masses.


EXTREMITIES: 2+ pulses, warm, well-perfused, no edema. 


NEUROLOGICAL: Cranial nerves II through XII grossly intact. Normal speech, gait 

not 


observed.


PSYCH: Normal mood, normal affect.


SKIN: Warm, dry, normal turgor, no rashes or lesions noted





Laboratory Results - last 24 hr





 3





  04/04/17 04/04/17





  05:35 05:35


 


WBC  5.6 


 


RBC  3.06 L 


 


Hgb  9.3 L 


 


Hct  27.9 L 


 


MCV  91.2 


 


MCHC  33.3 


 


RDW  15.6 


 


Plt Count  253  D 


 


MPV  7.6 


 


Sodium   143


 


Potassium   4.2


 


Chloride   108 H


 


Carbon Dioxide   25


 


Anion Gap   10


 


BUN   21 H


 


Creatinine   0.8


 


Creat Clearance w eGFR   > 60


 


Random Glucose   120 H


 


Calcium   8.4 L


 


Total Bilirubin   0.4


 


AST   8 L D


 


ALT   16


 


Alkaline Phosphatase   127 H


 


Total Protein   6.6


 


Albumin   2.4 L








Active Medications





 3





Generic Name Dose Route Start Last Admin





  Trade Name Freq  PRN Reason Stop Dose Admin


 


Acetaminophen  650 mg 03/27/17 21:00 04/02/17 14:02





  Tylenol -  PO   650 mg





  Q6H PRN   Administration





  FEVER OR PAIN   


 


Docusate Sodium  100 mg 03/27/17 22:02  





  Colace -  PO   





  BID PRN   





  CONSTIPATION   


 


Meropenem 1 gm/ Dextrose  100 mls @ 100 mls/hr 03/28/17 19:00 04/04/17 11:49





  IVPB   100 mls/hr





  Q8H-IV RAJ   Administration





  Protocol   


 


Lorazepam  1 mg 04/03/17 22:07 04/03/17 22:27





  Ativan -  PO   1 mg





  BID PRN   Administration





  ANXIETY   


 


Montelukast Sodium  10 mg 03/28/17 22:00 04/03/17 21:24





  Singulair -  PO   10 mg





  HS RAJ   Administration


 


Polyethylene Glycol  17 gm 03/29/17 00:15 04/03/17 21:24





  Miralax (For Daily Use) -  PO   Not Given





  HS RAJ   


 


Sodium Chloride  2 spray 03/28/17 05:08 04/03/17 10:10





  [place] Spray Nasal Spray -  NS   2 sprays





  TID PRN   Administration





  NASAL CONGESTION   


 


Topiramate  50 mg 03/28/17 10:00 04/04/17 11:49





  Topamax -  PO   50 mg





  DAILY RAJ   Administration











ASSESSMENT/PLAN:


This is a 61 year old female with PMHx of GERD, prediabetes, rectal carcinoid ca

, migraines admitted with polydipsia, polyuria, intermittent fevers/chills for 

4 weeks, treated with tamiflu as outpatient. Pt has been admitted for sepsis.





Severe sepsis 2/2 proteus mirabilis bacteremia, UTI


   - Continue Meropenem (3/28-4/6)


   - Most recent blood cultures with NGTD (72H)


   - WBC wnl


   - Afebrile


   - Will need to continue IV antibiotics through 4/6 in the morning then DC 

home 


   - Appreciate ID consult





Hyperglycemia


   - Improved after prednisone taper completed (last dose 3/29)


   - Patient refusing fingersticks


   - Hgb A1c 6.6


   - Diabetic diet and teaching





ADAM


   - Resolved





Thrombocytopenia


   - Likely 2/2 sepsis


   - Resolved





rectal cancer


   - Octreotide monthly, follows up with specialist as outpatient





Migraines


   - Continue Topamax





F/E/N:


   - Diabetic diet


   - Monitor electrolytes, stable at present





Prophylaxis: 


   - SCDs bilaterally


   - OOB ambulating





Dispo:


- Requires continued inpatient care





CODE STATUS: FULL CODE








Visit type





- Emergency Visit


Emergency Visit: Yes


ED Registration Date: 03/27/17


Care time: The patient presented to the Emergency Department on the above date 

and was hospitalized for further evaluation of their emergent condition.





- New Patient


This patient is new to me today: Yes


Date on this admission: 04/04/17





- Critical Care


Critical Care patient: No





- Discharge Referral


Referred to [facility] No

## 2017-04-04 NOTE — PN
Progress Note, Physician


History of Present Illness: 


patient doing well


just feeling tired


still not feeling that her strength is back


remaining stable





- Current Medication List


Current Medications: 


Active Medications





Acetaminophen (Tylenol -)  650 mg PO Q6H PRN


   PRN Reason: FEVER OR PAIN


   Last Admin: 04/02/17 14:02 Dose:  650 mg


Docusate Sodium (Colace -)  100 mg PO BID PRN


   PRN Reason: CONSTIPATION


Meropenem 1 gm/ Dextrose  100 mls @ 100 mls/hr IVPB Q8H-IV RAJ


   PRN Reason: Protocol


   Last Admin: 04/04/17 11:49 Dose:  100 mls/hr


Lorazepam (Ativan -)  1 mg PO BID PRN


   PRN Reason: ANXIETY


   Last Admin: 04/03/17 22:27 Dose:  1 mg


Montelukast Sodium (Singulair -)  10 mg PO HS RAJ


   Last Admin: 04/03/17 21:24 Dose:  10 mg


Polyethylene Glycol (Miralax (For Daily Use) -)  17 gm PO HS [facility]


   Last Admin: 04/03/17 21:24 Dose:  Not Given


Sodium Chloride (Ocean Spray Nasal Spray -)  2 spray NS TID PRN


   PRN Reason: NASAL CONGESTION


   Last Admin: 04/03/17 10:10 Dose:  2 sprays


Topiramate (Topamax -)  50 mg PO DAILY RAJ


   Last Admin: 04/04/17 11:49 Dose:  50 mg











- Objective


Vital Signs: 


 Vital Signs











Temperature  98.2 F   04/04/17 06:00


 


Pulse Rate  73   04/04/17 06:00


 


Respiratory Rate  20   04/04/17 06:00


 


Blood Pressure  102/60   04/04/17 06:00


 


O2 Sat by Pulse Oximetry (%)  97   04/03/17 09:00











Constitutional: Yes: No Distress, Calm


Cardiovascular: Yes: Regular Rate and Rhythm


Respiratory: Yes: Regular, CTA Bilaterally


Gastrointestinal: Yes: Normal Bowel Sounds, Soft


Musculoskeletal: Yes: WNL


Extremities: Yes: WNL


Neurological: Yes: Alert, Oriented


Psychiatric: Yes: Alert, Oriented


Labs: 


 CBC, BMP





 04/04/17 05:35 





 04/04/17 05:35 











Assessment/Plan


60 y/o F w/PMH of rectal ca, GERD, migraines, pre-diabetes presented to ER w/ c/

o polydipsia, polyuria, and intermittent fevers for 4 weeks. Admitted for 

severe sepsis secondary to UTI.





- sepsis secondary to UTI


-ADAM secondary to severe sepsis


-Thrombocytopenia 


-Abd pain 


-Hyperglycemia, polydipsia, polyuria


-Brachial plexus inflammation


-Rectal Ca, carcinoid


-migraines





plan


continue abx


blood cx negative


patient will need to complete abx till the 5th and morning dose of 6th


continue current mgmt

## 2017-04-05 RX ADMIN — MONTELUKAST SODIUM SCH MG: 10 TABLET, COATED ORAL at 21:11

## 2017-04-05 RX ADMIN — MEROPENEM SCH MLS/HR: 1 INJECTION, POWDER, FOR SOLUTION INTRAVENOUS at 01:24

## 2017-04-05 RX ADMIN — MEROPENEM SCH MLS/HR: 1 INJECTION, POWDER, FOR SOLUTION INTRAVENOUS at 09:20

## 2017-04-05 RX ADMIN — POLYETHYLENE GLYCOL 3350 SCH: 17 POWDER, FOR SOLUTION ORAL at 21:39

## 2017-04-05 RX ADMIN — MEROPENEM SCH MLS/HR: 1 INJECTION, POWDER, FOR SOLUTION INTRAVENOUS at 18:00

## 2017-04-05 RX ADMIN — TOPIRAMATE SCH MG: 25 TABLET, FILM COATED ORAL at 09:20

## 2017-04-05 NOTE — PN
Physical Exam: 


SUBJECTIVE: Patient seen and examined. She states she is feeling much better 

today and ambulating w/o issue. 








OBJECTIVE:





 Vital Signs











 Period  Temp  Pulse  Resp  BP Sys/Bethea  Pulse Ox


 


 Last 24 Hr  98.1 F-98.3 F  64-80  18-20  102-112/56-64  95








PE


Neuro: alert, awake, cn 2-12intact


Pulm: CTAB 


CV: s1 s2 rrr no mrg


Abd: s nt nd + bs


Ext: warm, no le edema 





Active Medications











Generic Name Dose Route Start Last Admin





  Trade Name Freq  PRN Reason Stop Dose Admin


 


Acetaminophen  650 mg 03/27/17 21:00 04/02/17 14:02





  Tylenol -  PO   650 mg





  Q6H PRN   Administration





  FEVER OR PAIN   


 


Docusate Sodium  100 mg 03/27/17 22:02  





  Colace -  PO   





  BID PRN   





  CONSTIPATION   


 


Meropenem 1 gm/ Dextrose  100 mls @ 100 mls/hr 03/28/17 19:00 04/05/17 09:20





  IVPB   100 mls/hr





  Q8H-IV RAJ   Administration





  Protocol   


 


Lorazepam  1 mg 04/03/17 22:07 04/04/17 21:19





  Ativan -  PO   1 mg





  BID PRN   Administration





  ANXIETY   


 


Montelukast Sodium  10 mg 03/28/17 22:00 04/04/17 21:20





  Singulair -  PO   10 mg





  HS RAJ   Administration


 


Polyethylene Glycol  17 gm 03/29/17 00:15 04/04/17 21:20





  Miralax (For Daily Use) -  PO   Not Given





  HS RAJ   


 


Sodium Chloride  2 spray 03/28/17 05:08 04/04/17 17:36





  Ocean Spray Nasal Spray -  NS   2 sprays





  TID PRN   Administration





  NASAL CONGESTION   


 


Topiramate  50 mg 03/28/17 10:00 04/05/17 09:20





  Topamax -  PO   50 mg





  DAILY RAJ   Administration








 Microbiology











 04/01/17 07:10 Blood Culture - Preliminary





 Blood - Peripheral Venous    NO GROWTH OBTAINED AFTER 96 HOURS, INCUBATION TO 

CONTINUE





    FOR 1 DAYS.


 


 04/01/17 07:05 Blood Culture - Preliminary





 Blood - Peripheral Venous    NO GROWTH OBTAINED AFTER 96 HOURS, INCUBATION TO 

CONTINUE





    FOR 1 DAYS.


 


 03/29/17 05:42 Blood Culture - Final





 Blood - Peripheral Venous    Proteus Mirabilis











Assessment: 61 year old female with PMHx of GERD, prediabetes, rectal carcinoid 

ca, migraines admitted with polydipsia, polyuria, intermittent fevers/chills 

for 4 weeks, treated with tamiflu. 





Plan: 





1. Severe sepsis d/ proteus mirbili bacteremia and UTI 


- Final dose Meropenem tomorrow AM (3/28-4/6)





2. Hyperlgycemia/borderline uncontrolled DM II


- AM sugars stable 


- Hgba1c  6.6 





3. Hyponatremia


- Resolved  





4. ADAM 


- Likely d/t sepsis; now resolved 





5. Thrombocytopenia 


- Likely d/t sepsis; now resolved 





6. Brachial plexus inflammation


- 7 day course of pednisone 3/29


- Outpt neuro follow up 





7. Rectal Ca, carcinoid


- Octreotide monthly, follows up with specialist as outpatient





8. Migraines


- Continue Topamax





Visit type





- Emergency Visit


Emergency Visit: Yes


ED Registration Date: 03/27/17


Care time: The patient presented to the Emergency Department on the above date 

and was hospitalized for further evaluation of their emergent condition.





- New Patient


This patient is new to me today: No





- Critical Care


Critical Care patient: No

## 2017-04-05 NOTE — PN
Progress Note, Physician


History of Present Illness: 


doing well


no issues


still getting a bit tired





- Current Medication List


Current Medications: 


Active Medications





Acetaminophen (Tylenol -)  650 mg PO Q6H PRN


   PRN Reason: FEVER OR PAIN


   Last Admin: 04/02/17 14:02 Dose:  650 mg


Docusate Sodium (Colace -)  100 mg PO BID PRN


   PRN Reason: CONSTIPATION


Meropenem 1 gm/ Dextrose  100 mls @ 100 mls/hr IVPB Q8H-IV RAJ


   PRN Reason: Protocol


   Last Admin: 04/05/17 09:20 Dose:  100 mls/hr


Lorazepam (Ativan -)  1 mg PO BID PRN


   PRN Reason: ANXIETY


   Last Admin: 04/04/17 21:19 Dose:  1 mg


Montelukast Sodium (Singulair -)  10 mg PO HS RAJ


   Last Admin: 04/04/17 21:20 Dose:  10 mg


Polyethylene Glycol (Miralax (For Daily Use) -)  17 gm PO HS [facility]


   Last Admin: 04/04/17 21:20 Dose:  Not Given


Sodium Chloride (Ocean Spray Nasal Spray -)  2 spray NS TID PRN


   PRN Reason: NASAL CONGESTION


   Last Admin: 04/04/17 17:36 Dose:  2 sprays


Topiramate (Topamax -)  50 mg PO DAILY [facility]


   Last Admin: 04/05/17 09:20 Dose:  50 mg











- Objective


Vital Signs: 


 Vital Signs











Temperature  97.8 F   04/05/17 15:28


 


Pulse Rate  81   04/05/17 15:28


 


Respiratory Rate  20   04/05/17 15:28


 


Blood Pressure  108/68   04/05/17 15:28


 


O2 Sat by Pulse Oximetry (%)  99   04/05/17 11:54











Constitutional: Yes: No Distress, Calm


Cardiovascular: Yes: Regular Rate and Rhythm


Respiratory: Yes: Regular, CTA Bilaterally


Gastrointestinal: Yes: Normal Bowel Sounds, Soft


Musculoskeletal: Yes: WNL


Extremities: Yes: WNL


Neurological: Yes: Alert, Oriented


Psychiatric: Yes: Alert, Oriented


Labs: 


 CBC, BMP





 04/04/17 05:35 





 04/04/17 05:35 











Assessment/Plan


60 y/o F w/PMH of rectal ca, GERD, migraines, pre-diabetes presented to ER w/ c/

o polydipsia, polyuria, and intermittent fevers for 4 weeks. Admitted for 

severe sepsis secondary to UTI.





- sepsis secondary to UTI


-ADAM secondary to severe sepsis


-Thrombocytopenia 


-Abd pain 


-Hyperglycemia, polydipsia, polyuria


-Brachial plexus inflammation


-Rectal Ca, carcinoid


-migraines





plan


continue abx


blood cx negative


patient will need to complete abx till the 5th and morning dose of 6th


continue current mgmt


patient does not need any more abx after tomorrow

## 2017-04-06 VITALS — HEART RATE: 80 BPM | SYSTOLIC BLOOD PRESSURE: 112 MMHG | TEMPERATURE: 97.6 F | DIASTOLIC BLOOD PRESSURE: 57 MMHG

## 2017-04-06 RX ADMIN — TOPIRAMATE SCH MG: 25 TABLET, FILM COATED ORAL at 09:13

## 2017-04-06 RX ADMIN — MEROPENEM SCH MLS/HR: 1 INJECTION, POWDER, FOR SOLUTION INTRAVENOUS at 09:14

## 2017-04-06 RX ADMIN — MEROPENEM SCH MLS/HR: 1 INJECTION, POWDER, FOR SOLUTION INTRAVENOUS at 02:49

## 2017-04-06 NOTE — DS
[id number] Vital Signs











 Period  Temp  Pulse  Resp  BP Sys/Bethea  Pulse Ox


 


 Last 24 Hr  97.8 F-98.7 F  72-82  20-20  102-125/55-68  96-99








PE


Neuro: alert, awake, cn 2-12intact


Pulm: CTAB 


CV: s1 s2 rrr no mrg


Abd: s nt nd + bs


Ext: warm, no le edema 





HOSPITAL COURSE:





Date of Admission:03/27/17





Date of Discharge: 04/06/17


Minutes to complete discharge: 35





Discharge Summary


Reason For Visit: SEPSIS, UTI, KIDNEY INJURY, DM


Current Active Problems





Acute kidney injury (Acute) 


Diabetes mellitus, new onset (Acute) 


Migraines (Acute) 


Sepsis (Acute) 


UTI (urinary tract infection) (Acute) 








Hospital Course: 


Initial Hospital Course: 


Briefly, this 61 year old female w/PMH of GERD, borderline diabetes, rectal 

carcinoid ca, migraines admitted with polydipsia, polyuria, intermittent fevers 

for last 1 month.  She stated intermittent chills, non-productive cough from 

allergies, and intermittent night sweats, sob and heaviness. Additionally, she 

reports dark urine and change in smell of urine over last few days. A week 

prior to admission, she saw her PCP who hydrated her with 2 L NS in the office. 

Diagnosed with pre-diabetes last summer, shes been working on lifestyle 

modifications. Recently, she saw neuro and was diagnosed with brachial plexus 

inflammation and started on prednisone 10mg bid for 7 days.  





Subsequent Hospital Course/Progress Note/Discharge Summary by a/p: 








 Microbiology











 04/01/17 07:10 Blood Culture - Final





 Blood - Peripheral Venous    NO GROWTH AFTER 5 DAYS INCUBATION


 


 04/01/17 07:05 Blood Culture - Final





 Blood - Peripheral Venous    NO GROWTH AFTER 5 DAYS INCUBATION








 Microbiology





04/01/17 07:10   Blood - Peripheral Venous   Blood Culture - Final


                            NO GROWTH AFTER 5 DAYS INCUBATION


04/01/17 07:05   Blood - Peripheral Venous   Blood Culture - Final


                            NO GROWTH AFTER 5 DAYS INCUBATION


03/29/17 05:42   Blood - Peripheral Venous   Blood Culture - Final


                            Proteus Mirabilis


03/31/17 11:15   Urine - Urine Clean Catch   Urine Culture - Final


                            NO GROWTH OBTAINED


03/29/17 07:06   Blood - Peripheral Venous   Blood Culture - Final


                            Proteus Mirabilis


03/27/17 13:25   Blood - Peripheral Venous   Blood Culture - Final


                            Proteus Mirabilis


03/27/17 13:30   Blood - Peripheral Venous   Blood Culture - Final


                            Proteus Mirabilis


03/27/17 13:25   Urine - Urine Clean Catch   Urine Culture - Final


                            Proteus Mirabilis


03/27/17 12:25   Nasopharyngeal Swab   Respiratory Virus Panel - Preliminary


03/27/17 12:25   Nasopharyngeal Swab   Influenza Types A,B Antigen (TRICIA) - Final


03/27/17 12:25   Nasopharyngeal Swab    - Final








Assessment: 61 year old female with PMHx of GERD, prediabetes, rectal carcinoid 

ca, migraines admitted with polydipsia, polyuria, intermittent fevers/chills 

for 4 weeks, treated with tamiflu. 





Plan: 





1. Severe sepsis d/t proteus mirabilis bacteremia and UTI 


- Repeat blood cultures no growth 


- Completed Meropenem 1gm q8h (3/28-4/6) no further po abx 





2. Hyperlgycemia/borderline uncontrolled DM II


- AM sugars stable 


- Hgba1c  6.6 





3. Hyponatremia


- Resolved  





4. ADAM 


- Likely d/t sepsis; now resolved 





5. Thrombocytopenia 


- Likely d/t sepsis; now resolved 





6. Brachial plexus inflammation


- 7 day course of pednisone 3/29


- Outpt neuro follow up 





7. Rectal Ca, carcinoid


- Octreotide monthly, follows up with specialist as outpatient





8. Migraines


- Continue Topamax





Dispo: 


- Home with resuming of home meds and PCP follow up in 1 week 


- Pt aware and agree to above plan 


Condition: Stable





- Instructions


Diet, Activity, Other Instructions: 


Please return to the ED for any new, persistent, or worsening symptoms. Follow 

up with your PCP in 1 week 


Resume home meds as directed 


Referrals: 


Jorge Taylor MD [Staff Physician] - 


Disposition: HOME





- Home Medications


Comprehensive Discharge Medication List: 


Ambulatory Orders





Spironolactone [Aldactone] 25 mg PO ASDIR PRN 05/13/15 


Montelukast Na [Singulair -] 10 mg PO HS 03/27/17 


Octreotide Acetate,Mi-Spheres [Sandostatin Lar Depot] 30 mg IM MONTHLY 03/27/17 


Topiramate [Topamax] 50 mg PO DAILY 03/27/17 








This patient is new to me today: No


Emergency Visit: Yes


ED Registration Date: 03/27/17


Care time: The patient presented to the Emergency Department on the above date 

and was hospitalized for further evaluation of their emergent condition.


Critical Care patient: No





- Discharge Referral


Referred to [facility] Yes


Physician Referral: Jorge Taylor MD (Int Med)

## 2017-04-15 ENCOUNTER — HOSPITAL ENCOUNTER (INPATIENT)
Dept: HOSPITAL 74 - JER | Age: 62
LOS: 6 days | Discharge: HOME | DRG: 473 | End: 2017-04-21
Attending: INTERNAL MEDICINE | Admitting: INTERNAL MEDICINE
Payer: COMMERCIAL

## 2017-04-15 VITALS — BODY MASS INDEX: 26.4 KG/M2

## 2017-04-15 DIAGNOSIS — M81.0: ICD-10-CM

## 2017-04-15 DIAGNOSIS — B96.4: ICD-10-CM

## 2017-04-15 DIAGNOSIS — Z85.048: ICD-10-CM

## 2017-04-15 DIAGNOSIS — M54.2: ICD-10-CM

## 2017-04-15 DIAGNOSIS — K21.9: ICD-10-CM

## 2017-04-15 DIAGNOSIS — M46.22: Primary | ICD-10-CM

## 2017-04-15 DIAGNOSIS — M25.512: ICD-10-CM

## 2017-04-15 DIAGNOSIS — D64.9: ICD-10-CM

## 2017-04-15 DIAGNOSIS — M25.511: ICD-10-CM

## 2017-04-15 DIAGNOSIS — M85.88: ICD-10-CM

## 2017-04-15 DIAGNOSIS — M54.12: ICD-10-CM

## 2017-04-15 DIAGNOSIS — R73.9: ICD-10-CM

## 2017-04-15 DIAGNOSIS — G43.909: ICD-10-CM

## 2017-04-15 LAB
ALBUMIN SERPL-MCNC: 3.1 G/DL (ref 3.4–5)
ALP SERPL-CCNC: 98 U/L (ref 45–117)
ALT SERPL-CCNC: 17 U/L (ref 12–78)
ANION GAP SERPL CALC-SCNC: 10 MMOL/L (ref 8–16)
AST SERPL-CCNC: 25 U/L (ref 15–37)
BASOPHILS # BLD: 1.3 % (ref 0–2)
BILIRUB SERPL-MCNC: 0.4 MG/DL (ref 0.2–1)
CALCIUM SERPL-MCNC: 8.6 MG/DL (ref 8.5–10.1)
CO2 SERPL-SCNC: 23 MMOL/L (ref 21–32)
COCKROFT - GAULT: 80.75
CREAT SERPL-MCNC: 0.8 MG/DL (ref 0.55–1.02)
DEPRECATED RDW RBC AUTO: 16.1 % (ref 11.6–15.6)
EOSINOPHIL # BLD: 1.8 % (ref 0–4.5)
GLUCOSE SERPL-MCNC: 97 MG/DL (ref 74–106)
INR BLD: 1.01 (ref 0.82–1.09)
MCH RBC QN AUTO: 29.8 PG (ref 25.7–33.7)
MCHC RBC AUTO-ENTMCNC: 32.4 G/DL (ref 32–36)
MCV RBC: 91.9 FL (ref 80–96)
NEUTROPHILS # BLD: 66.5 % (ref 42.8–82.8)
PLATELET # BLD AUTO: 189 K/MM3 (ref 134–434)
PMV BLD: 7.1 FL (ref 7.5–11.1)
PROT SERPL-MCNC: 6.9 G/DL (ref 6.4–8.2)
PT PNL PPP: 11.1 SEC (ref 9.98–11.88)
WBC # BLD AUTO: 4 K/MM3 (ref 4–10)

## 2017-04-15 PROCEDURE — C1751 CATH, INF, PER/CENT/MIDLINE: HCPCS

## 2017-04-15 RX ADMIN — MONTELUKAST SODIUM SCH MG: 10 TABLET, COATED ORAL at 23:28

## 2017-04-15 NOTE — PDOC
History of Present Illness





- History of Present Illness


Initial Comments: 


04/15/17 13:02


The patient is a 61 year old female, with a significant past medical history of

, rectal CA, GERD and migraine headaches, who presents to the emergency 

department with neck and bilateral shoulder pain after discharge from the 

hospital for kidney failure and sepsis on 4/6/17. She states Dr. Perez 

called her today with findings from an MRI she took yesterday which was 

positive for osteomyelitis at C4,C5. She reports her neck pain is exacerbated 

with palpation at the midline and with extension of her neck. She states she 

was referred to the ED by Dr. Perez for treatment. 





She denies chest pain, shortness of breath, headache and dizziness. She denies 

fever, chills, nausea, vomit, diarrhea and constipation. She denies dysuria, 

frequency, urgency and hematuria. 





Allergies: adverse stomach reaction to erythromycin


Social history: Denies toxic habits


PCP - Dr. Taylor





<Nilsa Duncan - Last Filed: 04/15/17 13:01>





<Fifi Orona - Last Filed: 04/16/17 07:36>





- General


Chief Complaint: Pain


Stated Complaint: PAIN


Time Seen by Provider: 04/15/17 12:02





Past History





<Nilsa Duncan - Last Filed: 04/15/17 13:01>





- Past Medical History


Cancer: Yes (RECTAL)


GI Disorders: Yes (GERD)





- Psycho/Social/Smoking Cessation Hx


Anxiety: No


Suicidal Ideation: No


Smoking History: Never smoked


Have you smoked in the past 12 months: No


Information on smoking cessation initiated: No


Hx Alcohol Use: Yes (SOCIAL)


Drug/Substance Use Hx: No


Substance Use Type: None





<Fifi Orona - Last Filed: 04/16/17 07:36>





- Past Medical History


Allergies/Adverse Reactions: 


 Allergies











Allergy/AdvReac Type Severity Reaction Status Date / Time


 


everolimus [From Afinitor] AdvReac Severe SOB, Verified 04/15/17 11:17





   Myalgias,  





   Arthralgias,Edema  


 


erythromycin base AdvReac   Verified 04/15/17 11:17





[Erythromycin Base]     











Home Medications: 


Ambulatory Orders





Montelukast Na [Singulair -] 10 mg PO HS 03/27/17 


Topiramate [Topamax] 50 mg PO DAILY 03/27/17 


Lorazepam [Ativan] 2 mg PO HS 04/15/17 











**Review of Systems





- Review of Systems


Able to Perform ROS?: Yes


Comments:: 


04/15/17 13:02


GENERAL/CONSTITUTIONAL: No fever or chills. No weakness.


HEAD, EYES, EARS, NOSE AND THROAT: No change in vision. No ear pain or 

discharge. No sore throat.


CARDIOVASCULAR: No chest pain or shortness of breath.


RESPIRATORY: No cough, wheezing, or hemoptysis.


GASTROINTESTINAL: No nausea, vomiting, diarrhea or constipation.


GENITOURINARY: No dysuria, frequency, or change in urination.


MUSCULOSKELETAL: (+) neck and bilateral shoulder pain. No joint or muscle 

swelling.


SKIN: No rash


NEUROLOGIC: No headache, vertigo, loss of consciousness, or change in strength/

sensation.


ENDOCRINE: No increased thirst. No abnormal weight change.


HEMATOLOGIC/LYMPHATIC: No anemia, easy bleeding, or history of blood clots.


ALLERGIC/IMMUNOLOGIC: No hives or skin allergy.








<Nilsa Duncan - Last Filed: 04/15/17 13:01>





*Physical Exam





- Vital Signs


 Last Vital Signs











Temp Pulse Resp BP Pulse Ox


 


 98 F   94 H  18   126/69   97 


 


 04/15/17 11:14  04/15/17 11:14  04/15/17 11:14  04/15/17 11:14  04/15/17 11:14














- Physical Exam


Comments: 


04/15/17 13:03





GENERAL: Awake, alert, and fully oriented, in no acute distress


HEAD: No signs of trauma


EYES: PERRLA, EOMI, sclera anicteric, conjunctiva clear


ENT: Auricles normal inspection, hearing grossly normal, nares patent, 

oropharynx clear without


exudates. Moist mucosa


NECK: Normal ROM, supple, no lymphadenopathy, JVD, or masses


LUNGS: Breath sounds equal, clear to auscultation bilaterally.  No wheezes, and 

no crackles


HEART: Regular rate and rhythm, normal S1 and S2, no murmurs, rubs or gallops


ABDOMEN: Soft, nontender, normoactive bowel sounds.  No guarding, no rebound.  

No masses


EXTREMITIES: Normal range of motion, no edema.  No clubbing or cyanosis. No 

cords, erythema, or tenderness


NEUROLOGICAL: Cranial nerves II through XII grossly intact.  Normal speech, 

normal gait


SKIN: Warm, Dry, normal turgor, no rashes or lesions noted.


SPINE: (+) Tenderness to palpation over the midline at C6 and C7. Pain elicited 

upon extending C-spine





<Nilsa Duncan - Last Filed: 04/15/17 13:01>





- Vital Signs


 Last Vital Signs











Temp Pulse Resp BP Pulse Ox


 


 98 F   94 H  18   126/69   97 


 


 04/15/17 11:14  04/15/17 11:14  04/15/17 11:14  04/15/17 11:14  04/15/17 11:14














<Fifi Orona - Last Filed: 04/16/17 07:36>





ED Treatment Course





- LABORATORY


CBC & Chemistry Diagram: 


 04/15/17 12:49





 04/15/17 12:49





<Fifi Orona - Last Filed: 04/16/17 07:36>





Medical Decision Making





- Medical Decision Making


04/15/17 12:33


Dr. Olivera, neurologist, was paged st 12:35 via answering service requesting a 

call back for doctor to doctor consult. I have been informed that Dr. Perez 

is covering the practice this afternoon.





04/15/17 12:56


Dr. Perez returned the call at 12:40 and the patient's case was discussed. 





<Nilsa Duncan - Last Filed: 04/15/17 13:01>





- Medical Decision Making


04/15/17 12:50


Case d/w Dr. Perez via phone. I will send labs, admit to hospitalist service 

for consultation with ID, spine. 





04/15/17 15:47


D/w Dr. Monroy at bedside, will defer antibiotics until biopsy is done. 








<Fifi Orona - Last Filed: 04/16/17 07:36>





*DC/Admit/Observation/Transfer





- Attestations


Scribe Attestion: 





04/15/17 13:05





Documentation prepared by Nilsa Duncan, acting as medical scribe for 

Fifi Orona MD, MD





<Nilsa Duncan - Last Filed: 04/15/17 13:01>





- Discharge Dispostion


Admit: Yes





<Fifi Orona - Last Filed: 04/16/17 07:36>


Diagnosis at time of Disposition: 


Osteomyelitis


Qualifiers:


 Osteomyelitis type: subacute Osteomyelitis location: other site Qualified Code(

s): M86.28 - Subacute osteomyelitis, other site





- Discharge Dispostion


Condition at time of disposition: Stable





- Referrals

## 2017-04-15 NOTE — HP
CHIEF COMPLAINT:


cervical osteomylitis


PCP:


Dr. Mora


HISTORY OF PRESENT ILLNESS:


Pt presents to the emergency department with neck and bilateral shoulder pain 

after discharge from the hospital for kidney failure and sepsis on 4/6/17. She 

states Dr. Perez called her today with findings from an MRI she took 

yesterday which was positive for osteomyelitis at C4,C5. She reports her neck 

pain is exacerbated with palpation at the midline and with extension of her 

neck. She states she was referred to the ED by Dr. Perez for treatment. 





ER course was notable for:


(1) pain shoulder with recent finding of OM in cervical c4-5





Recent Travel:


none


PAST MEDICAL HISTORY:


rectal CA, GERD and migraine headaches, 


PAST SURGICAL HISTORY:





Social History:


Smoking:denies


Alcohol:denies


Drugs: denies





Family History:


Allergies





everolimus [From Afinitor] Adverse Reaction (Severe, Verified 04/15/17 11:17)


 SOB, Myalgias, Arthralgias,Edema


erythromycin base [Erythromycin Base] Adverse Reaction (Verified 04/15/17 11:17)


 


 NAUSEA 








HOME MEDICATIONS:


 Home Medications











 Medication  Instructions  Recorded


 


Montelukast Na [Singulair -] 10 mg PO HS 03/27/17


 


Topiramate [Topamax] 50 mg PO DAILY 03/27/17








REVIEW OF SYSTEMS


CONSTITUTIONAL: 


Absent:  fever, chills, diaphoresis, generalized weakness, malaise, loss of 

appetite, weight change


HEENT: 


Absent:  rhinorrhea, nasal congestion, throat pain, throat swelling, difficulty 

swallowing, mouth swelling, ear pain, eye pain, visual changes


CARDIOVASCULAR: 


Absent: chest pain, syncope, palpitations, irregular heart rate, lightheadedness

, peripheral edema


RESPIRATORY: 


Absent: cough, shortness of breath, dyspnea with exertion, orthopnea, wheezing, 

stridor, hemoptysis


GASTROINTESTINAL:


Absent: abdominal pain, abdominal distension, nausea, vomiting, diarrhea, 

constipation, melena, hematochezia


GENITOURINARY: 


Absent: dysuria, frequency, urgency, hesitancy, hematuria, flank pain, genital 

pain


MUSCULOSKELETAL: 


Absent: myalgia, arthralgia, joint swelling, back pain, neck pain


SKIN: 


Absent: rash, itching, pallor


HEMATOLOGIC/IMMUNOLOGIC: 


Absent: easy bleeding, easy bruising, lymphadenopathy, frequent infections


ENDOCRINE:


Absent: unexplained weight gain, unexplained weight loss, heat intolerance, 

cold intolerance


NEUROLOGIC: 


Absent: headache, focal weakness or paresthesias, dizziness, unsteady gait, 

seizure, mental status changes, bladder or bowel incontinence


PSYCHIATRIC: 


Absent: anxiety, depression, suicidal or homicidal ideation, hallucinations.








PHYSICAL EXAMINATION


 Vital Signs - 24 hr











  04/15/17





  11:14


 


Temperature 98 F


 


Pulse Rate 94 H


 


Respiratory 18





Rate 


 


Blood Pressure 126/69


 


O2 Sat by Pulse 97





Oximetry (%) 











GENERAL: Awake, alert, and fully oriented, in no acute distress.


HEAD: Normal with no signs of trauma.


NECK: Normal range of motion, supple without lymphadenopathy, JVD, or masses.


LUNGS: Breath sounds equal, clear to auscultation bilaterally. No wheezes, and 

no crackles. No accessory muscle use.


HEART: Regular rate and rhythm, normal S1 and S2 without murmur, rub or gallop.


ABDOMEN: Soft, nontender, not distended, normoactive bowel sounds, no guarding, 

no rebound, no masses.  No hepatomegaly or  splenomegaly. 


MUSCULOSKELETAL: Normal range of motion at all joints. No bony deformities or 

tenderness. No CVA tenderness.


UPPER EXTREMITIES: 2+ pulses, warm, well-perfused. No cyanosis. No clubbing. No 

peripheral edema.


LOWER EXTREMITIES: 2+ pulses, warm, well-perfused. No calf tenderness. No 

peripheral edema. 


NEUROLOGICAL:  Cranial nerves II-XII intact. Normal speech. Normal gait.


PSYCHIATRIC: Cooperative. Good eye contact. Appropriate mood and affect.


SKIN: Warm, dry, normal turgor, no rashes or lesions noted, normal capillary 

refill. 


 Laboratory Results - last 24 hr











  04/15/17 04/15/17 04/15/17





  12:49 12:49 12:49


 


WBC  4.0  


 


RBC  3.30 L  


 


Hgb  9.8 L  


 


Hct  30.3 L  


 


MCV  91.9  


 


MCHC  32.4  


 


RDW  16.1 H  


 


Plt Count  189  D  


 


MPV  7.1 L  


 


Neutrophils %  66.5  D  


 


Lymphocytes %  21.7  D  


 


Monocytes %  8.7  


 


Eosinophils %  1.8  


 


Basophils %  1.3  


 


INR   1.01 


 


Sodium    143


 


Potassium    4.1


 


Chloride    110 H


 


Carbon Dioxide    23


 


Anion Gap    10


 


BUN    24 H


 


Creatinine    0.8


 


Creat Clearance w eGFR    > 60


 


Random Glucose    97


 


Calcium    8.6


 


Total Bilirubin    0.4


 


AST    25  D


 


ALT    17


 


Alkaline Phosphatase    98  D


 


Total Protein    6.9


 


Albumin    3.1 L D











ASSESSMENT/PLAN:


61 yr female with c/o cervical osteomylitis recently found on MRI


-appreciate Dr. Monroy for ID consult


-will follow cx


-pain medications


-spine surgeon, 


-planned bx with [facility] and hold ABT 





Visit type





- Emergency Visit


Emergency Visit: Yes


ED Registration Date: 04/15/17


Care time: The patient presented to the Emergency Department on the above date 

and was hospitalized for further evaluation of their emergent condition.





- New Patient


This patient is new to me today: Yes


Date on this admission: 04/15/17





- Critical Care


Critical Care patient: No

## 2017-04-15 NOTE — CON.NEURO
Consult


Consult Specialty:: Neurology


Reason for Consultation:: Discitis/Osteomyelitis





- History of Present Illness


Chief Complaint: Pain in neck and right arm weakness for about 6 weeks


History of Present Illness: 


I was called last night by Dr. Burgos about my colleague Dr. Olivera's patient, 

Ms. Garner to alert me to a radiologic finding prompting the current 

admission.


The patient is a 61 year old woman with a history of carcinoid tumor of the 

rectum who was admitted to  last month for UTI/Sepsis and saw my associate 

Dr. Olivera in late March.  He saw her for neck pain and did an EMG which showed 

some deficits which he thought were probably either radicular or brachial 

neuritis, gave her a steroid taper and sent her for the MRI of the brachial 

plexus, which wasnt' done until yesterday.  The steroids helped her pain in the 

arm considerably ,but she continued to have neck pain which then worsened and 

spread tot he other side.  THe pain is aggravated by flexion extension of the 

neck.  


The patient's symptoms started in early March, well before her sepsis, and 

there was no preceding procedure or surgery or open skin injury that she can 

identify or any dental work that might serve as a risk factor.














- History Source


History Provided By: Patient, Medical Record


Limitations to Obtaining History: No Limitations





- Alcohol/Substance Use


Hx Alcohol Use: Yes (SOCIAL)





- Smoking History


Smoking history: Never smoked


Have you smoked in the past 12 months: No





Home Medications





- Allergies


Allergies/Adverse Reactions: 


 Allergies











Allergy/AdvReac Type Severity Reaction Status Date / Time


 


everolimus [From Afinitor] AdvReac Severe SOB, Verified 04/15/17 11:17





   Myalgias,  





   Arthralgias,Edema  


 


erythromycin base AdvReac   Verified 04/15/17 11:17





[Erythromycin Base]     














- Home Medications


Home Medications: 


Ambulatory Orders





Montelukast Na [Singulair -] 10 mg PO HS 03/27/17 


Topiramate [Topamax] 50 mg PO DAILY 03/27/17 











Physical Exam-Neuro


Vital Signs: 


 Vital Signs











Temperature  98 F   04/15/17 11:14


 


Pulse Rate  66   04/15/17 16:57


 


Respiratory Rate  18   04/15/17 16:57


 


Blood Pressure  119/69   04/15/17 16:57


 


O2 Sat by Pulse Oximetry (%)  99   04/15/17 16:57











Constitutional: Yes: Well Nourished, No Distress, Anxious (appropriately so)


Neck: Yes: Tenderness (on flexion or extension)


Labs: 


 INR, PTT











INR  1.01  (0.82-1.09)   04/15/17  12:49    














- Neuro Exam


Level Of Consciousness: Yes: Alert


Eyes: Yes: PERRLA


Speech: WNL


Cranial Nerves II-XII Intact: Yes


DTR's: 2+ Left Bicep, 2+ Right Bicep, 2+ Left Tricep, 2+ Right Tricep, 2+ Left 

Brachioradialis, 2+ Right Brachioradialis, 2+ Left Achilles, 2+ Right Achilles


Babinski: Absent


Response to light touch: Normal


Response to pain prick: Normal


Motor Strength: 4/5: Right Arm (Right deltoid and triceps are weak), 5/5: Left 

Arm, Left Leg, Right Leg





NIH Stroke Scale





- Total Score


NIH Stroke Scale Score: 0





Imaging





- Results


MRI: Report Reviewed, Image Reviewed





Problem List





- Problems


(1) Osteomyelitis


Assessment/Plan: 


Osteomyelitis, discitis, with associated weakness in the right deltoid and 

triceps muscles, not accounted for by pain.  Will need biopsy of area, if not 

debulking followed by IV antibiotics.  Await completion of neurosurgery consult.





Code(s): M86.9 - OSTEOMYELITIS, UNSPECIFIED   Qualifiers: 


     Osteomyelitis type: subacute     Osteomyelitis location: other site        

Qualified Code(s): M86.28 - Subacute osteomyelitis, other site

## 2017-04-15 NOTE — CONSULT
Consult


Consult Specialty:: infectiou diseases


Reason for Consultation:: osteo of cervical vertebra





- History of Present Illness


Chief Complaint: neck pain


History of Present Illness: 


this patient well known to me from last admission who was admitted with uti 

culminating in bacteremia ,who was treated and went home without any issues.


patient about a month before this episode of bactermeia was c/o of pain in the 

cervical region and shoulder and neurologist nadya evalauted her and was 

diagnosed as brachial plexitis and treated with steroids and patient was 

feelign better.


During the last admission patient did not complain of neck pain though she 

mentioned she was having that before and during her hospitaly stay had no neck 

pain.


couple of days after discharge patient again started c/o of neck pain and was 

seen by the neurologist and on the 14th of this month mri was done showing 

cervical osteo with compression on the spinal cord


Patient was resend for admission and further work up and treatment


currently she is having pain while any type of movement of the neck region,

though there is minimal tenderness on palpation 





- History Source


History Provided By: Patient


Limitations to Obtaining History: No Limitations





- Alcohol/Substance Use


Hx Alcohol Use: Yes (SOCIAL)





- Smoking History


Smoking history: Never smoked


Have you smoked in the past 12 months: No





Home Medications





- Allergies


Allergies/Adverse Reactions: 


 Allergies











Allergy/AdvReac Type Severity Reaction Status Date / Time


 


everolimus [From Afinitor] AdvReac Severe SOB, Verified 04/15/17 11:17





   Myalgias,  





   Arthralgias,Edema  


 


erythromycin base AdvReac   Verified 04/15/17 11:17





[Erythromycin Base]     














- Home Medications


Home Medications: 


Ambulatory Orders





Montelukast Na [Singulair -] 10 mg PO HS 03/27/17 


Topiramate [Topamax] 50 mg PO DAILY 03/27/17 


Lorazepam [Ativan] 2 mg PO HS 04/15/17 











Family Disease History





- Family Disease History


Other Family History: not relevant





Review of Systems





- Review of Systems


Constitutional: reports: No Symptoms


Eyes: reports: No Symptoms


HENT: reports: No Symptoms


Neck: reports: Pain on Movement, Tenderness


Cardiovascular: reports: No Symptoms


Respiratory: reports: No Symptoms


Gastrointestinal: reports: No Symptoms


Genitourinary: reports: No Symptoms


Integumentary: reports: No Symptoms


Neurological: reports: No Symptoms


Endocrine: reports: No Symptoms


Hematology/Lymphatic: reports: No Symptoms


Psychiatric: reports: No Symptoms





Physical Exam


Vital Signs: 


 Vital Signs











Temperature  98 F   04/15/17 11:14


 


Pulse Rate  94 H  04/15/17 11:14


 


Respiratory Rate  18   04/15/17 11:14


 


Blood Pressure  126/69   04/15/17 11:14


 


O2 Sat by Pulse Oximetry (%)  97   04/15/17 11:14











Constitutional: Yes: Well Nourished, Calm, Mild Distress


Eyes: Yes: Conjunctiva Clear


HENT: Yes: Atraumatic, Normocephalic


Neck: Yes: Supple, Trachea Midline, Tenderness (on anytype of movement of the 

neck).  No: Rigid


Cardiovascular: Yes: Regular Rate and Rhythm


Respiratory: Yes: Regular, CTA Bilaterally


Gastrointestinal: Yes: Normal Bowel Sounds, Soft


Musculoskeletal: Yes: Muscle Pain


Extremities: Yes: WNL


Integumentary: Yes: WNL


Neurological: Yes: Alert, Oriented


Psychiatric: Yes: Alert, Oriented





Imaging





- Results


MRI: Image Reviewed





Assessment/Plan


after looking at the mri looks like patient c4-c5  diskitits /osteo with 

compression symptoms and destruction of the vertebra





osteo of cervical vertebra


pain





plan


to get cervical mri with contrast


neuro to see


await for bone biopsy 


once we have that then will decide what abx and how long


currently will not start any abx


await for final results of blood cx


once we have biopsy done will initiate empric abx untill final results are 

obtained

## 2017-04-16 LAB
ALBUMIN SERPL-MCNC: 3 G/DL (ref 3.4–5)
ALP SERPL-CCNC: 97 U/L (ref 45–117)
ALT SERPL-CCNC: 17 U/L (ref 12–78)
ANION GAP SERPL CALC-SCNC: 10 MMOL/L (ref 8–16)
APPEARANCE UR: CLEAR
AST SERPL-CCNC: 11 U/L (ref 15–37)
BACTERIA #/AREA URNS HPF: (no result) /HPF
BASOPHILS # BLD: 0.9 % (ref 0–2)
BILIRUB SERPL-MCNC: 0.6 MG/DL (ref 0.2–1)
BILIRUB UR STRIP.AUTO-MCNC: NEGATIVE MG/DL
CALCIUM SERPL-MCNC: 8.7 MG/DL (ref 8.5–10.1)
CO2 SERPL-SCNC: 22 MMOL/L (ref 21–32)
COCKROFT - GAULT: 81.59
COLOR UR: (no result)
CREAT SERPL-MCNC: 0.8 MG/DL (ref 0.55–1.02)
CRP SERPL-MCNC: < 0.3 MG/DL (ref 0–0.3)
DEPRECATED RDW RBC AUTO: 16.3 % (ref 11.6–15.6)
EOSINOPHIL # BLD: 2.9 % (ref 0–4.5)
GLUCOSE SERPL-MCNC: 112 MG/DL (ref 74–106)
KETONES UR QL STRIP: NEGATIVE
LEUKOCYTE ESTERASE UR QL STRIP.AUTO: (no result)
MCH RBC QN AUTO: 29.8 PG (ref 25.7–33.7)
MCHC RBC AUTO-ENTMCNC: 32.3 G/DL (ref 32–36)
MCV RBC: 92.3 FL (ref 80–96)
MUCOUS THREADS URNS QL MICRO: (no result)
NEUTROPHILS # BLD: 64.6 % (ref 42.8–82.8)
NITRITE UR QL STRIP: NEGATIVE
PH UR: 6 [PH] (ref 5–8)
PLATELET # BLD AUTO: 191 K/MM3 (ref 134–434)
PMV BLD: 7.2 FL (ref 7.5–11.1)
PROT SERPL-MCNC: 6.8 G/DL (ref 6.4–8.2)
PROT UR QL STRIP: NEGATIVE
PROT UR QL STRIP: NEGATIVE
RBC # BLD AUTO: 4 /HPF (ref 0–3)
RBC # UR STRIP: NEGATIVE /UL
SP GR UR: 1.01 (ref 1–1.03)
UROBILINOGEN UR STRIP-MCNC: NEGATIVE E.U./DL (ref 0.2–1)
WBC # BLD AUTO: 3.3 K/MM3 (ref 4–10)
WBC # UR AUTO: 47 /HPF (ref 3–5)

## 2017-04-16 RX ADMIN — MONTELUKAST SODIUM SCH MG: 10 TABLET, COATED ORAL at 21:31

## 2017-04-16 RX ADMIN — TOPIRAMATE SCH MG: 25 TABLET, FILM COATED ORAL at 09:54

## 2017-04-16 RX ADMIN — POTASSIUM CHLORIDE AND SODIUM CHLORIDE SCH MLS/HR: 450; 150 INJECTION, SOLUTION INTRAVENOUS at 10:09

## 2017-04-16 RX ADMIN — POTASSIUM CHLORIDE AND SODIUM CHLORIDE SCH MLS/HR: 450; 150 INJECTION, SOLUTION INTRAVENOUS at 21:40

## 2017-04-16 NOTE — PN
Progress Note (short form)





- Note


Progress Note: 


NEUROSURGERY CONSULT DICTATED





H/o carcinoid tumor and borderline DM.  Neck and bilateral shoulder pain x 4 

weeks with R arm weakness.  Seen Dr Perez.  ALso had low grade temp since 1 

month ago with malaise.  Admitted two weeks ago with urosepsis and treated with 

abx.  Pt reports her neck pain is exacerbated with palpation at the midline and 

with extension of her neck. H/o rectal carcinoid tumor on chemo last in August. 


PE: AF, VSS


HEENT- NC/ACT; Neck- decreased ROM, flexion 35 degrees extension 20 degrees, + 

spasm; Cor-RRR; Lungs- CTA B; Abd- benign; Ext- no sign of DVT


CN- intact; Motor- 5/5 except R D/supraspinatus/B/BR 4+; Sensation- intact LT/

vibration; DTR- 1+; B toes upgoing


Neck MRI- C4-5 discitis/osteomyelitis; anterior epidural soft tissue thickening

; posterior cervical ligamentus edema C4-5; prevertebral soft tissue swelling


C4-5 discitis/osteomyelitis with epidural? soft tissue anteriorly


For emergency C spine MRI with rad


Need ant cervical decompression fusion, instrumentation, L iliac graft


Surgical intervention recommended for tissue diagnosis, debridement of infected 

disc, decompression of neurological element, and stabilization of spine


Risks: bleeding, infection, paralysis, nerve injury,  hoarseness, swallowing 

difficulty, non-diagnostic tissue, other risks of GA


Her risks are somewhat higher due to medical conditions


Pt concurs with plan


IVF post IV contrast


Will schedule with OR and arrange implants


ID input


Urine culture

## 2017-04-16 NOTE — EKG
Test Reason : 

Blood Pressure : ***/*** mmHG

Vent. Rate : 065 BPM     Atrial Rate : 065 BPM

   P-R Int : 176 ms          QRS Dur : 074 ms

    QT Int : 402 ms       P-R-T Axes : 063 052 027 degrees

   QTc Int : 418 ms

 

NORMAL SINUS RHYTHM

NORMAL ECG

WHEN COMPARED WITH ECG OF 27-MAR-2017 12:41,

NO SIGNIFICANT CHANGE WAS FOUND

Confirmed by KASHIF ANDERSON MD ([id number]) on 4/16/2017 2:25:11 PM

 

Referred By:             Confirmed By:KASHIF ANDERSON MD

## 2017-04-16 NOTE — PN
Physical Exam: 


SUBJECTIVE: Patient seen and examined. She complains of neck pain. 








OBJECTIVE:





 Vital Signs











 Period  Temp  Pulse  Resp  BP Sys/Bethea  Pulse Ox


 


 Last 24 Hr  97.9 F-98.2 F  66-72  18-18  106-120/51-80  97-99











GENERAL: The patient is awake, alert, and fully oriented, in no acute distress.


NECK: Trachea midline, full range of motion, supple. 


LUNGS: Breath sounds equal, clear to auscultation bilaterally, no wheezes, no 

crackles, no accessory muscle use. 


HEART: Regular rate and rhythm, S1, S2 without murmur, rub or gallop.


ABDOMEN: Soft, nontender, nondistended, normoactive bowel sounds, no guarding, 

no rebound, no hepatosplenomegaly, no masses.


EXTREMITIES: 2+ pulses, warm, well-perfused, no edema. 











 Laboratory Results - last 24 hr











  04/16/17 04/16/17 04/16/17





  03:49 07:30 07:30


 


WBC   3.3 L 


 


RBC   3.33 L 


 


Hgb   9.9 L 


 


Hct   30.7 L 


 


MCV   92.3 


 


MCHC   32.3 


 


RDW   16.3 H 


 


Plt Count   191 


 


MPV   7.2 L 


 


Neutrophils %   64.6 


 


Lymphocytes %   22.8 


 


Monocytes %   8.8 


 


Eosinophils %   2.9 


 


Basophils %   0.9 


 


PTT (Actin FS)   


 


Sodium    143


 


Potassium    4.1


 


Chloride    111 H


 


Carbon Dioxide    22


 


Anion Gap    10


 


BUN    19 H D


 


Creatinine    0.8


 


Creat Clearance w eGFR    > 60


 


Random Glucose    112 H


 


Calcium    8.7


 


Total Bilirubin    0.6  D


 


AST    11 L D


 


ALT    17


 


Alkaline Phosphatase    97


 


C-Reactive Protein    < 0.3


 


Total Protein    6.8


 


Albumin    3.0 L


 


Urine Color  Straw  


 


Urine Appearance  Clear  


 


Urine pH  6.0  


 


Ur Specific Gravity  1.012  


 


Urine Protein  Negative  


 


Urine Glucose (UA)  Negative  


 


Urine Ketones  Negative  


 


Urine Blood  Negative  


 


Urine Nitrite  Negative  


 


Urine Bilirubin  Negative  


 


Urine Urobilinogen  Negative  


 


Ur Leukocyte Esterase  3+ H  


 


Urine RBC  4  


 


Urine WBC  47  


 


Ur Epithelial Cells  Rare  


 


Urine Bacteria  Rare  


 


Urine Mucus  Rare  


 


Blood Type   


 


Antibody Screen   














  04/16/17 04/16/17 04/16/17





  07:30 09:35 09:35


 


WBC   


 


RBC   


 


Hgb   


 


Hct   


 


MCV   


 


MCHC   


 


RDW   


 


Plt Count   


 


MPV   


 


Neutrophils %   


 


Lymphocytes %   


 


Monocytes %   


 


Eosinophils %   


 


Basophils %   


 


PTT (Actin FS)   26.9 


 


Sodium   


 


Potassium   


 


Chloride   


 


Carbon Dioxide   


 


Anion Gap   


 


BUN   


 


Creatinine   


 


Creat Clearance w eGFR   


 


Random Glucose   


 


Calcium   


 


Total Bilirubin   


 


AST   


 


ALT   


 


Alkaline Phosphatase   


 


C-Reactive Protein  Cancelled  


 


Total Protein   


 


Albumin   


 


Urine Color   


 


Urine Appearance   


 


Urine pH   


 


Ur Specific Gravity   


 


Urine Protein   


 


Urine Glucose (UA)   


 


Urine Ketones   


 


Urine Blood   


 


Urine Nitrite   


 


Urine Bilirubin   


 


Urine Urobilinogen   


 


Ur Leukocyte Esterase   


 


Urine RBC   


 


Urine WBC   


 


Ur Epithelial Cells   


 


Urine Bacteria   


 


Urine Mucus   


 


Blood Type    A POSITIVE


 


Antibody Screen    Negative














  04/16/17





  10:07


 


WBC 


 


RBC 


 


Hgb 


 


Hct 


 


MCV 


 


MCHC 


 


RDW 


 


Plt Count 


 


MPV 


 


Neutrophils % 


 


Lymphocytes % 


 


Monocytes % 


 


Eosinophils % 


 


Basophils % 


 


PTT (Actin FS) 


 


Sodium 


 


Potassium 


 


Chloride 


 


Carbon Dioxide 


 


Anion Gap 


 


BUN 


 


Creatinine 


 


Creat Clearance w eGFR 


 


Random Glucose 


 


Calcium 


 


Total Bilirubin 


 


AST 


 


ALT 


 


Alkaline Phosphatase 


 


C-Reactive Protein 


 


Total Protein 


 


Albumin 


 


Urine Color 


 


Urine Appearance 


 


Urine pH 


 


Ur Specific Gravity 


 


Urine Protein 


 


Urine Glucose (UA) 


 


Urine Ketones 


 


Urine Blood 


 


Urine Nitrite 


 


Urine Bilirubin 


 


Urine Urobilinogen 


 


Ur Leukocyte Esterase 


 


Urine RBC 


 


Urine WBC 


 


Ur Epithelial Cells 


 


Urine Bacteria 


 


Urine Mucus 


 


Blood Type  A POSITIVE


 


Antibody Screen 








Active Medications











Generic Name Dose Route Start Last Admin





  Trade Name Freq  PRN Reason Stop Dose Admin


 


Potassium Chloride/Sodium Chloride  1,000 mls @ 75 mls/hr 04/16/17 08:15 04/16/ 17 10:09





  1/2ns+20meq Kcl  IV   75 mls/hr





  ASDIR RAJ   Administration


 


Lorazepam  1 mg 04/16/17 21:00  





  Ativan -  PO 04/16/17 21:01  





  ONCE ONE   


 


Montelukast Sodium  10 mg 04/15/17 22:00 04/15/17 23:28





  Singulair -  PO   10 mg





  HS RAJ   Administration


 


Topiramate  50 mg 04/16/17 10:00 04/16/17 09:54





  Topamax -  PO   50 mg





  DAILY RAJ   Administration











ASSESSMENT/PLAN:


This is a 61-year-old woman with a history of rectal carcinoid, migraine 

headaches, GERD who presented to the ER after being found to have cervical 

discitis/osteomyelitis on MRI.





1. C4-5 discitis and osteomyelitis


   - Plan for surgery tomorrow


   - Pain control


   - C-RP <0.3, ESR pending


   - Blood cultures negative after 24 hours





2. Migraine headaches


   - Continue Topamax





Visit type





- Emergency Visit


Emergency Visit: Yes


ED Registration Date: 04/15/17


Care time: The patient presented to the Emergency Department on the above date 

and was hospitalized for further evaluation of their emergent condition.





- New Patient


This patient is new to me today: Yes


Date on this admission: 04/16/17





- Critical Care


Critical Care patient: No





- Discharge Referral


Referred to [facility] No

## 2017-04-16 NOTE — PN
Progress Note, Physician


History of Present Illness: 


stable


no issues


evaluated by neuro


patient for or tomorrow








- Current Medication List


Current Medications: 


Active Medications





Potassium Chloride/Sodium Chloride (1/2ns+20meq Kcl)  1,000 mls @ 75 mls/hr IV 

ASDIR [facility]


   Last Admin: 04/16/17 10:09 Dose:  75 mls/hr


Lorazepam (Ativan -)  1 mg PO ONCE ONE


   Stop: 04/16/17 21:01


Montelukast Sodium (Singulair -)  10 mg PO HS [facility]


   Last Admin: 04/15/17 23:28 Dose:  10 mg


Oxycodone HCl (Roxicodone -)  5 mg PO Q6H PRN


   PRN Reason: PAIN


Topiramate (Topamax -)  50 mg PO DAILY [facility]


   Last Admin: 04/16/17 09:54 Dose:  50 mg











- Objective


Vital Signs: 


 Vital Signs











Temperature  98.2 F   04/16/17 06:00


 


Pulse Rate  72   04/16/17 10:00


 


Respiratory Rate  18   04/16/17 10:00


 


Blood Pressure  116/80   04/16/17 10:00


 


O2 Sat by Pulse Oximetry (%)  98   04/16/17 09:00











Constitutional: Yes: No Distress, Calm


HENT: Yes: Other (pain on movement)


Cardiovascular: Yes: Regular Rate and Rhythm


Respiratory: Yes: Regular, CTA Bilaterally


Gastrointestinal: Yes: Normal Bowel Sounds, Soft


Musculoskeletal: Yes: WNL


Extremities: Yes: WNL


Neurological: Yes: Alert, Oriented


Psychiatric: Yes: Alert, Oriented


Labs: 


 CBC, BMP





 04/16/17 07:30 





 04/16/17 07:30 





 INR, PTT











INR  1.01  (0.82-1.09)   04/15/17  12:49    














Assessment/Plan


osteo of cervical vertebra


pain





plan


await for surgery


once we have tissue and diagnosis we will manage 


rest no abx until biopsy is done


pain mgmt

## 2017-04-16 NOTE — CONS
DATE OF CONSULTATION:  04/16/2017

 

CHIEF COMPLAINT:  Neck pain, right arm weakness, with fever.

 

HISTORY OF PRESENT ILLNESS:  The patient is a 61-year-old ambidextrous female with a

history of carcinoid tumor, status post chemotherapy treatment, last dose in August

of last year, and borderline diabetes, who complains of approximately 1 month history

of mild neck pain and right arm weakness.  She had a low-grade temperature.  She also

had developed malaise at the time.  The patient attributed to having a flu.  About 2

weeks ago, she had presented to the hospital with 103 fever, and at that time was

treated for urosepsis.  The patient subsequently had defervesced, and went home.  She

was discharged about a week ago.  She now complains of increasing neck pain and right

arm weakness.  She also feels generalized fatigue.  The patient denies Lhermitte

sign, has no bowel or bladder dysfunction.  There is persistent right arm weakness as

well as neck, and left and right scapular pain.  She has no ataxia or bowel or

bladder incontinence.  There is no history of prior infection, by report.

 

Past medical history is significant for carcinoid tumor, borderline diabetes, recent

urosepsis.

 

Current medications include Topamax, Singulair.

 

Allergies to ERYTHROMYCIN and EVEROLIMUS.

 

Review of systems is otherwise negative for other major cardiovascular, pulmonary,

gastrointestinal, genitourinary, endocrinological, neurological, or psychological

problem.

 

Family history is noncontributory.

 

In terms of social history, she works as an  at [employer].  She does not smoke and only drinks alcohol socially.  She denied any

recreational drugs.

 

PHYSICAL EXAMINATION:

Vital Signs:  Temperature is 98.2, blood pressure 106/55, with pulse rate 66, O2

saturation is 97% on room air.

HEENT:  Normocephalic, atraumatic.  Anicteric.

Neck:  Supple, but with decreased range of motion.  Cervical spine flexion is 35

degrees and extension is 20 degrees.  Lateral rotation is 45 degrees in each

direction.  There is mild cervical paraspinal muscle spasm bilaterally.

Coronary:  Regular rhythm.

Lungs:  Clear bilaterally.

Abdomen:  Benign.

Extremities:  No signs of DVT.

Neurologic:  The patient is awake and alert, oriented x4.  Cranial nerves examination

is intact, 2-12.  Motor examination shows 5/5 strength except for right deltoid,

supraspinatus, biceps and brachioradialis, which are 4+.  Sensory examination intact

to light touch and proprioception, as well as vibratory sensation.  Deep tendon

reflexes are 1+ throughout.  There are no pathologic long tract signs.  Bilateral

toes were upgoing in the lower extremities.  Gait is not tested, for safety reasons. 

Cerebellar examination demonstrated normal pronation without tremor.

 

Laboratory examination shows BUN 24 and creatinine 0.8.  Albumin is 3.1.  INR is

1.11.  White blood cell count is 4000, hemoglobin is 9.8, and platelet count is

189,000.  Urinalysis shows 3+ leukocyte esterase with 4 RBCs and 47 WBCs.  Blood

culture is pending.

 

MRI of the neck demonstrated C4-5 diskitis with adjacent edema.  There profuse

prevertebral soft tissue swelling with edema from C2 to C5.  There is anterior

epidural extension of the soft tissue intensity as well.  There is no spinal cord

edema or myelomalacia.

 

IMPRESSION:

1.  C4-5 diskitis/osteomyelitis.

2.  History of carcinoid tumor with history of prior chemotherapy regimen.

3.  Borderline diabetes.

4.  Recent urosepsis.

 

RECOMMENDATIONS:  The patient presents with 4-week history of neck pain, fever,

malaise, and right arm weakness.  On my examination today she has trace weakness of

the right C4, C5, and C6 distribution.  There is no associated numbness.  She does

have bilateral upgoing toes.  MRI of the neck demonstrated C4-5 diskitis with edema

to C4 and C5 vertebral bodies.  There is also prevertebral soft tissue intensity

consistent with inflammation and possible infection.  The patient is scheduled for

emergency MRI shortly, which will better delineate the pathology at C4 and C5.  After

that, she is a candidate for anterior cervical decompression, fusion,

instrumentation, with left anterior iliac crest autologous bone graft.  The patient

has history of borderline osteoporosis and autologous bone graft may or may not be

adequate.  In that case, allograft may need to be used instead.  This will be done

with intraoperative traction as well as SSEP/EMG/MEP monitoring.  The risks of

procedure include but are not limited to bleeding, infection, dural tear with CSF

leak, paralysis, hoarseness, swallowing difficulties, and other risks of general

anesthesia.  The patient understands the indication for the procedure, procedure in

detail, risks and benefits, and alternative modes of treatment for her cervical spine

condition.  She concurred with the plan.  She understands that she is at high risk of

developing medical adverse events because of her underlying medical history.  All

questions were answered.  No guarantee was given for a favorable outcome.  The goal

is to try to obtain a culture diagnosis of the source of infection as well as

identification of the organism, remove the infected tissue at C4-5, especially disk

material, decompress spinal cord cervical roots, as well as provide stabilization of

the unstable infected cervical spine.  The patient was informed of the potential

alternative of IV antibiotic only, but in light of the advantage of the surgical

procedure to confer additional benefits, as well as seriousness of her condition,

surgical intervention is warranted.  All questions are answered at bedside.

 

 

LISANDRA MORA M.D.

 

[id number]

DD: 04/16/2017 08:10

DT: 04/16/2017 12:22

Job #:  [id number]

## 2017-04-17 LAB
ALBUMIN SERPL-MCNC: 3 G/DL (ref 3.4–5)
ALP SERPL-CCNC: 103 U/L (ref 45–117)
ALT SERPL-CCNC: 18 U/L (ref 12–78)
ANION GAP SERPL CALC-SCNC: 8 MMOL/L (ref 8–16)
AST SERPL-CCNC: 15 U/L (ref 15–37)
BILIRUB SERPL-MCNC: 0.4 MG/DL (ref 0.2–1)
CALCIUM SERPL-MCNC: 9.1 MG/DL (ref 8.5–10.1)
CO2 SERPL-SCNC: 27 MMOL/L (ref 21–32)
CREAT SERPL-MCNC: 0.9 MG/DL (ref 0.55–1.02)
DEPRECATED RDW RBC AUTO: 16.6 % (ref 11.6–15.6)
GLUCOSE SERPL-MCNC: 119 MG/DL (ref 74–106)
MCH RBC QN AUTO: 30.4 PG (ref 25.7–33.7)
MCHC RBC AUTO-ENTMCNC: 32.8 G/DL (ref 32–36)
MCV RBC: 92.8 FL (ref 80–96)
PLATELET # BLD AUTO: 185 K/MM3 (ref 134–434)
PMV BLD: 6.9 FL (ref 7.5–11.1)
PROT SERPL-MCNC: 6.8 G/DL (ref 6.4–8.2)
WBC # BLD AUTO: 6.8 K/MM3 (ref 4–10)

## 2017-04-17 PROCEDURE — 0RG1070 FUSION OF CERVICAL VERTEBRAL JOINT WITH AUTOLOGOUS TISSUE SUBSTITUTE, ANTERIOR APPROACH, ANTERIOR COLUMN, OPEN APPROACH: ICD-10-PCS

## 2017-04-17 PROCEDURE — 0RT30ZZ RESECTION OF CERVICAL VERTEBRAL DISC, OPEN APPROACH: ICD-10-PCS

## 2017-04-17 RX ADMIN — HYDROMORPHONE HYDROCHLORIDE PRN MG: 1 INJECTION, SOLUTION INTRAMUSCULAR; INTRAVENOUS; SUBCUTANEOUS at 16:45

## 2017-04-17 RX ADMIN — POTASSIUM CHLORIDE AND SODIUM CHLORIDE SCH MLS/HR: 450; 150 INJECTION, SOLUTION INTRAVENOUS at 21:04

## 2017-04-17 RX ADMIN — POTASSIUM CHLORIDE AND SODIUM CHLORIDE SCH: 450; 150 INJECTION, SOLUTION INTRAVENOUS at 19:12

## 2017-04-17 RX ADMIN — DOCUSATE SODIUM SCH MG: 100 CAPSULE, LIQUID FILLED ORAL at 21:09

## 2017-04-17 RX ADMIN — HYDROMORPHONE HYDROCHLORIDE PRN MG: 1 INJECTION, SOLUTION INTRAMUSCULAR; INTRAVENOUS; SUBCUTANEOUS at 16:35

## 2017-04-17 RX ADMIN — POTASSIUM CHLORIDE AND SODIUM CHLORIDE SCH MLS/HR: 450; 150 INJECTION, SOLUTION INTRAVENOUS at 08:56

## 2017-04-17 RX ADMIN — MONTELUKAST SODIUM SCH MG: 10 TABLET, COATED ORAL at 21:09

## 2017-04-17 RX ADMIN — TOPIRAMATE SCH: 25 TABLET, FILM COATED ORAL at 10:00

## 2017-04-17 NOTE — PN
Progress Note (short form)





- Note


Progress Note: 


NEUROSURGERY 





Neck pain


PE: AF, VSS


HEENT- NC/ACT; Neck- decreased ROM, flexion 35 degrees extension 20 degrees, + 

spasm; Cor-RRR; Lungs- CTA B; Abd- benign; Ext- no sign of DVT


CN- intact; Motor- 5/5 except R D/supraspinatus/B/BR 4+; Sensation- intact LT/

vibration; DTR- 1+; B toes upgoing


Neck MRI- C4-5 discitis/osteomyelitis; anterior epidural soft tissue thickening

; posterior cervical ligamentus edema C4-5; prevertebral soft tissue swelling; 

+ enhancement


C4-5 discitis/osteomyelitis with epidural soft tissue anteriorly


Blood and urine culture pending


Need C4-5 ant cervical decompression, fusion, instrumentation, L iliac graft C4-

5, possible corpectomies and fusion needed C3-6 if C4/5 eroded/affected 

significantly and need to be resected


Surgical intervention recommended for tissue diagnosis, debridement of infected 

disc, decompression of neurological element, and stabilization of spine


Risks: bleeding, infection, paralysis, nerve injury,  hoarseness, swallowing 

difficulty, non-diagnostic tissue (prior course of abx), other risks of GA


Pros and cons of tx approaches discussed; Pt understands indications for the 

procedure, procedure in detail, risks/benefits, alternatives and chooses to 

proceed


All questions answered


Her risks are somewhat higher due to medical conditions including prior CA and 

chemo, borderline DM and osteoporosis


The above discussed with pt again


Pt concurs with plan


Care plan discussed with  by phone


ID f/u- abx held for cultures

## 2017-04-17 NOTE — PN
Physical Exam: 


SUBJECTIVE: Patient seen and examined at bed side this morning. Complaints of 

mild pain over the back of the neck on movement. No other complaints. Denies 

headache, fever, chest pain, sob, cough, palpitation, abdominal pain, nausea or 

vomiting. Bowel/Bladder habit normal. Sleep/Appetite normal. 








OBJECTIVE:





 Vital Signs











 Period  Temp  Pulse  Resp  BP Sys/Bethea  Pulse Ox


 


 Last 24 Hr  97.3 F-98.7 F  70-84  18-20  100-123/59-80  98-98











GENERAL: The patient is awake, alert, and fully oriented, in no acute distress.


HEAD: Normal with no signs of trauma.


EYES: PERRL, extraocular movements intact, sclera anicteric, conjunctiva clear. 

No ptosis. 


ENT: Ears normal, nares patent, oropharynx clear without exudates, moist mucous 


membranes.


NECK: Trachea midline, supple but decreased range of motion, flexion 35 degrees 

extension 20 degrees, + spasm.


LUNGS: Breath sounds equal, clear to auscultation bilaterally, no wheezes, no 

crackles, no accessory muscle use. 


HEART: Regular rate and rhythm, S1, S2 without murmur, rub or gallop.


ABDOMEN: Soft, nontender, nondistended, normoactive bowel sounds, no guarding, 

no 


rebound, no hepatosplenomegaly, no masses.


EXTREMITIES: 2+ pulses, warm, well-perfused, no edema. 


NEUROLOGICAL: Cranial nerves II through XII grossly intact. Normal speech, gait 

not observed.


PSYCH: Normal mood, normal affect.


SKIN: Warm, dry, normal turgor, no rashes or lesions noted














 Laboratory Results - last 24 hr











  04/16/17 04/16/17 04/16/17





  07:30 07:30 07:30


 


WBC  3.3 L  


 


RBC  3.33 L  


 


Hgb  9.9 L  


 


Hct  30.7 L  


 


MCV  92.3  


 


MCHC  32.3  


 


RDW  16.3 H  


 


Plt Count  191  


 


MPV  7.2 L  


 


Neutrophils %  64.6  


 


Lymphocytes %  22.8  


 


Monocytes %  8.8  


 


Eosinophils %  2.9  


 


Basophils %  0.9  


 


ESR   


 


PTT (Actin FS)   


 


Sodium   143 


 


Potassium   4.1 


 


Chloride   111 H 


 


Carbon Dioxide   22 


 


Anion Gap   10 


 


BUN   19 H D 


 


Creatinine   0.8 


 


Creat Clearance w eGFR   > 60 


 


Random Glucose   112 H 


 


Calcium   8.7 


 


Total Bilirubin   0.6  D 


 


AST   11 L D 


 


ALT   17 


 


Alkaline Phosphatase   97 


 


C-Reactive Protein   < 0.3  Cancelled


 


Total Protein   6.8 


 


Albumin   3.0 L 


 


Blood Type   


 


Antibody Screen   














  04/16/17 04/16/17 04/16/17





  07:30 09:35 09:35


 


WBC   


 


RBC   


 


Hgb   


 


Hct   


 


MCV   


 


MCHC   


 


RDW   


 


Plt Count   


 


MPV   


 


Neutrophils %   


 


Lymphocytes %   


 


Monocytes %   


 


Eosinophils %   


 


Basophils %   


 


ESR  75 H  


 


PTT (Actin FS)   26.9 


 


Sodium   


 


Potassium   


 


Chloride   


 


Carbon Dioxide   


 


Anion Gap   


 


BUN   


 


Creatinine   


 


Creat Clearance w eGFR   


 


Random Glucose   


 


Calcium   


 


Total Bilirubin   


 


AST   


 


ALT   


 


Alkaline Phosphatase   


 


C-Reactive Protein   


 


Total Protein   


 


Albumin   


 


Blood Type    A POSITIVE


 


Antibody Screen    Negative














  04/16/17





  10:07


 


WBC 


 


RBC 


 


Hgb 


 


Hct 


 


MCV 


 


MCHC 


 


RDW 


 


Plt Count 


 


MPV 


 


Neutrophils % 


 


Lymphocytes % 


 


Monocytes % 


 


Eosinophils % 


 


Basophils % 


 


ESR 


 


PTT (Actin FS) 


 


Sodium 


 


Potassium 


 


Chloride 


 


Carbon Dioxide 


 


Anion Gap 


 


BUN 


 


Creatinine 


 


Creat Clearance w eGFR 


 


Random Glucose 


 


Calcium 


 


Total Bilirubin 


 


AST 


 


ALT 


 


Alkaline Phosphatase 


 


C-Reactive Protein 


 


Total Protein 


 


Albumin 


 


Blood Type  A POSITIVE


 


Antibody Screen 








Active Medications











Generic Name Dose Route Start Last Admin





  Trade Name Freq  PRN Reason Stop Dose Admin


 


Potassium Chloride/Sodium Chloride  1,000 mls @ 75 mls/hr 04/16/17 08:15 04/16/ 17 21:40





  1/2ns+20meq Kcl  IV   75 mls/hr





  ASDIR RAJ   Administration


 


Montelukast Sodium  10 mg 04/15/17 22:00 04/16/17 21:31





  Singulair -  PO   10 mg





  HS RAJ   Administration


 


Oxycodone HCl  5 mg 04/16/17 12:30 04/16/17 21:31





  Roxicodone -  PO   5 mg





  Q6H PRN   Administration





  PAIN   


 


Topiramate  50 mg 04/16/17 10:00 04/16/17 09:54





  Topamax -  PO   50 mg





  DAILY RAJ   Administration











ASSESSMENT/PLAN:





Patient is a 62 year old female with significant past medical history of 

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 

presented to the ED with the chief complaints of neck and bilateral shoulder 

pain. Recently discharged from the hospital for sepsis secondary to UTI (

proteus mirabilis bateremia) on 4/6/17.





# Osteomyelitis of C4/C5


   Patient presented with neck and bilateral shoulder pain


   MRI of the neck showed: C4-5 discitis/osteomyelitis; anterior epidural soft 

tissue thickening; posterior cervical ligamentus edema C4-5; prevertebral soft 

tissue 


   swelling; + enhancement.


   Admitted in Med-Surg


   Dr. Trotter consult appreciated


   Plan: Surgical intervention recommended for tissue diagnosis, debridement of 

infected disc, decompression of neurological element, and stabilization of spine


   Surgery at 11:00 am today


   NPO for surgery


   IV Ceftriaxone 1gm daily


   IV Fluids @ 75 mls/hr


   


# GERD- no active symptoms


   


# Migraine-


   No complaints of headache





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





# FEN


   IV NS @ 75mls/hr


   Electrolytes to be repeated


   NPO





# Prophylaxis


   For DVT: On SCDs, not on anticoagulation since patient is going in for the 

surgery this morning.


   For GI: Not indicated





# Code status: Full Code





# Dispo: Admitted in Med-Surg. Duration of stay unknown. 





Illness, Investigation and Plan of care explained to the patient. She 

verbalized understanding.





Case discussed with Dr. Braswell.


   


   





Visit type





- Emergency Visit


Emergency Visit: Yes


ED Registration Date: 04/15/17


Care time: The patient presented to the Emergency Department on the above date 

and was hospitalized for further evaluation of their emergent condition.





- New Patient


This patient is new to me today: Yes


Date on this admission: 04/17/17





- Critical Care


Critical Care patient: No

## 2017-04-17 NOTE — SURG
Surgery First Assist Note


First Assist: Jessica Espino PA-C


Date of Service: 04/17/17


Diagnosis: 


 C4-5 discitis/osteomyelitis; bony erosion; cervical stenosis, radiculopathy





Procedure: 





C4-5 discectomy, partial C4 and C5 corpectomies, interbody fusion C4-5, L 

anterior iliac crest tricortical graft harvest, anterior instrumentation C4-5 

with [employer] Spine Aviator system; microdissection, traction tongs


I was present for the entirety of the operative procedure. For further detail, 

please refer to operative report.








Visit type





- Case Type


Case Type: ED Admission





- Emergency


Emergency Visit: Yes


ED Registration Date: 04/15/17


Care time: The patient presented to the Emergency Department on the above date 

and was hospitalized for further evaluation of their emergent condition.





- New patient


This patient is new to me today: Yes


Date on this admission: 04/17/17





- Critical Care


Critical Care patient: No

## 2017-04-17 NOTE — PN
Progress Note, Physician


History of Present Illness: 


due for surgery later today


understands reasons for surgery 


feels minor weakness R arm 


mild neck pain 





- Current Medication List


Current Medications: 


Active Medications





Ceftriaxone Sodium (Rocephin 1gm Ivpb (Pre-Docked))  1 gm IVPB DAILY RAJ


Diazepam (Valium -)  5 mg PO TID RAJ


Docusate Sodium (Colace -)  100 mg PO TID RAJ


Vancomycin HCl (Vancomycin (Pre-Docked))  250 mls @ 250 mls/hr IVPB BID RAJ


   PRN Reason: Protocol


Vancomycin HCl (Vancomycin (Pre-Docked))  250 mls @ 250 mls/hr IVPB ONCE ONE


   PRN Reason: Protocol


   Stop: 04/18/17 00:59


Potassium Chloride/Sodium Chloride (1/2ns+20meq Kcl)  1,000 mls @ 75 mls/hr IV 

ASDIR RAJ


Montelukast Sodium (Singulair -)  10 mg PO HS RAJ


Ondansetron HCl (Zofran Injection)  4 mg IVPUSH Q6H PRN


   PRN Reason: NAUSEA AND/OR VOMITING


   Stop: 04/17/17 21:33


Oxycodone HCl (Roxicodone -)  5 mg PO Q6H PRN


   PRN Reason: PAIN


Topiramate (Topamax -)  50 mg PO DAILY RAJ











- Objective


Vital Signs: 


 Vital Signs











Temperature  98.1 F   04/17/17 18:56


 


Pulse Rate  86   04/17/17 18:56


 


Respiratory Rate  16   04/17/17 18:56


 


Blood Pressure  131/64   04/17/17 18:56


 


O2 Sat by Pulse Oximetry (%)  100   04/17/17 18:56











Neurological: Yes: Other (mild weakness R deltoid ; reduced R BI/BR reflex  

plantars donwgoing)


Labs: 


 CBC, BMP





 04/17/17 17:05 





 04/17/17 17:05 





 INR, PTT











INR  1.01  (0.82-1.09)   04/15/17  12:49    














Problem List





- Problems


(1) Osteomyelitis


Code(s): M86.9 - OSTEOMYELITIS, UNSPECIFIED   Qualifiers: 


     Osteomyelitis type: subacute     Osteomyelitis location: other site        

Qualified Code(s): M86.28 - Subacute osteomyelitis, other site  





(2) Cervical discitis


Code(s): M46.42 - DISCITIS, UNSPECIFIED, CERVICAL REGION








Assessment/Plan


cervical diskitis, C4-C5


? source (frequent UTI's) 


neurologically stable though requires surgical decompression 


and PICC line thereafter 





will FU after surgery 





Dr Olivera

## 2017-04-17 NOTE — PN
Teaching Attending Note


Name of Resident: Rosangela Payne


ATTENDING PHYSICIAN STATEMENT





I saw and evaluated the patient.


I reviewed the resident's note and discussed the case with the resident.


I agree with the resident's findings and plan as documented.








SUBJECTIVE: Patient complains of neck pain.








OBJECTIVE:


 Vital Signs











 Period  Temp  Pulse  Resp  BP Sys/Bethea  Pulse Ox


 


 Last 24 Hr  97.3 F-98.7 F  70-84  18-20  100-120/59-69  95-98








GENERAL: The patient is awake, alert, and fully oriented, in no acute distress.


NECK: Trachea midline, full range of motion, supple. 


LUNGS: Breath sounds equal, clear to auscultation bilaterally, no wheezes, no 

crackles, no accessory muscle use. 


HEART: Regular rate and rhythm, S1, S2 without murmur, rub or gallop.


ABDOMEN: Soft, nontender, nondistended, normoactive bowel sounds, no guarding, 

no rebound, no hepatosplenomegaly, no masses.


EXTREMITIES: 2+ pulses, warm, well-perfused, no edema. 








ASSESSMENT AND PLAN:


This is a 61-year-old woman with a history of rectal carcinoid, migraine 

headaches, GERD who presented to the ER after being found to have cervical 

discitis/osteomyelitis on MRI.





1. C4-5 discitis and osteomyelitis


   - Plan for surgery today - anterior cervical decompression, fusion, 

instrumentation, left iliac graft


   - Pain control with oxycodone IR as needed


   - C-RP <0.3, ESR 75


   - Blood cultures negative after 48 hours





2. Migraine headaches


   - Continue Topamax

## 2017-04-17 NOTE — OP
Operative Note





- Note:


Operative Date: 04/17/17


Pre-Operative Diagnosis: C4-5 discitis/osteomyelitis; bony erosion; cervical 

stenosis, radiculopathy


Operation: C4-5 discectomy, partial C4 and C5 corpectomies, interbody fusion C4-

5, L anterior iliac crest tricortical graft harvest, anterior instrumentation C4

-5 with [employer]ker Spine Aviator system; microdissection, traction tongs


Findings: 


nearly empty C4-5 disc space with collapse; soft tissue erosion of C5 anterior 

vertebral body; tissue cultures and path sent; swab cultures sent


Implants: Santos Aviator 14 mm plate and 14 mm screws x2; L anterior iliac 

tricortical graft


Post-Operative Diagnosis: Same as Pre-op


Surgeon: Phan Trotter


Assistant: Jessica Espino


Anesthesiologist/CRNA: Natalia Myles MD


Anesthesia: General


Specimens Removed: C4-5 disc material, eroded bone


Estimated Blood Loss (mls): 100

## 2017-04-17 NOTE — PN
Progress Note, Physician


History of Present Illness: 


patient post op


feels well


no complaint


neck in collar





- Current Medication List


Current Medications: 


Active Medications





Ceftriaxone Sodium (Rocephin 1gm Ivpb (Pre-Docked))  1 gm IVPB DAILY [facility]


Diazepam (Valium -)  5 mg PO TID RAJ


Docusate Sodium (Colace -)  100 mg PO TID RAJ


Vancomycin HCl (Vancomycin (Pre-Docked))  250 mls @ 250 mls/hr IVPB BID RAJ


   PRN Reason: Protocol


Vancomycin HCl (Vancomycin (Pre-Docked))  250 mls @ 250 mls/hr IVPB ONCE ONE


   PRN Reason: Protocol


   Stop: 04/18/17 00:59


Potassium Chloride/Sodium Chloride (1/2ns+20meq Kcl)  1,000 mls @ 75 mls/hr IV 

ASDIR RAJ


Montelukast Sodium (Singulair -)  10 mg PO HS RAJ


Ondansetron HCl (Zofran Injection)  4 mg IVPUSH Q6H PRN


   PRN Reason: NAUSEA AND/OR VOMITING


   Stop: 04/17/17 21:33


Oxycodone HCl (Roxicodone -)  5 mg PO Q6H PRN


   PRN Reason: PAIN


Topiramate (Topamax -)  50 mg PO DAILY [facility]











- Objective


Vital Signs: 


 Vital Signs











Temperature  98.1 F   04/17/17 18:07


 


Pulse Rate  86   04/17/17 18:07


 


Respiratory Rate  16   04/17/17 18:07


 


Blood Pressure  131/64   04/17/17 18:07


 


O2 Sat by Pulse Oximetry (%)  100   04/17/17 17:55











Constitutional: Yes: No Distress, Calm


Neck: Yes: Other (neck in collar)


Cardiovascular: Yes: Regular Rate and Rhythm


Respiratory: Yes: Regular, CTA Bilaterally


Gastrointestinal: Yes: Normal Bowel Sounds, Soft


Musculoskeletal: Yes: WNL


Extremities: Yes: WNL


Neurological: Yes: Alert, Oriented, Other (hard collar)


Psychiatric: Yes: Alert, Oriented


Labs: 


 CBC, BMP





 04/17/17 17:05 





 04/17/17 17:05 





 INR, PTT











INR  1.01  (0.82-1.09)   04/15/17  12:49    














Assessment/Plan


osteo of cervical vertebra


pain





plan


post op


patient given vanco and ceftriaxone


all cx send


will continue ceftriaxone for now


await for tissue cx

## 2017-04-17 NOTE — PN
Progress Note (short form)





- Note


Progress Note: 


NEUROSURGERY 





In PACU


PE: AF, VSS; O2 sat 100%


Minimal incisional pain


Motor B UE/ LE 4+-5 B; good UE ROM


Sensation intact LT


Dressing intact


Ordered fungal and AFB cultures also-called lab


On Vanco and Ceftriaxone for now- ID to manage iv abx long term


Labs pending


Intra-op findings and patient condition reviewed with family

## 2017-04-18 RX ADMIN — DOCUSATE SODIUM SCH MG: 100 CAPSULE, LIQUID FILLED ORAL at 05:59

## 2017-04-18 RX ADMIN — MONTELUKAST SODIUM SCH MG: 10 TABLET, COATED ORAL at 21:06

## 2017-04-18 RX ADMIN — POTASSIUM CHLORIDE AND SODIUM CHLORIDE SCH MLS/HR: 450; 150 INJECTION, SOLUTION INTRAVENOUS at 09:36

## 2017-04-18 RX ADMIN — DOCUSATE SODIUM SCH MG: 100 CAPSULE, LIQUID FILLED ORAL at 21:04

## 2017-04-18 RX ADMIN — CEFTRIAXONE SCH GM: 1 INJECTION, POWDER, FOR SOLUTION INTRAMUSCULAR; INTRAVENOUS at 09:35

## 2017-04-18 RX ADMIN — TOPIRAMATE SCH MG: 25 TABLET, FILM COATED ORAL at 09:34

## 2017-04-18 RX ADMIN — ACETAMINOPHEN PRN MG: 325 TABLET ORAL at 16:14

## 2017-04-18 RX ADMIN — Medication SCH APPLIC: at 12:10

## 2017-04-18 RX ADMIN — DOCUSATE SODIUM SCH MG: 100 CAPSULE, LIQUID FILLED ORAL at 14:04

## 2017-04-18 RX ADMIN — POTASSIUM CHLORIDE AND SODIUM CHLORIDE SCH: 450; 150 INJECTION, SOLUTION INTRAVENOUS at 18:51

## 2017-04-18 NOTE — PN
Teaching Attending Note


Name of Resident: Miryam Olivas


ATTENDING PHYSICIAN STATEMENT





I saw and evaluated the patient.


I reviewed the resident's note and discussed the case with the resident.


I agree with the resident's findings and plan as documented.








SUBJECTIVE:continues to have pain but controlled with pain medication. denies CP

, SOB,fever, chills








OBJECTIVE:


 Last Vital Signs











Temp Pulse Resp BP Pulse Ox


 


 100.1 F H  88   16   140/77   97 


 


 04/18/17 14:52  04/18/17 14:52  04/18/17 14:52  04/18/17 14:52  04/18/17 09:00








General NAD, c-collar in place dressing c/d/i


CV S1 S2 RRR no murmur/rub/gallop


Lungs CTA B/L no wheezing/rales/rhonchi





ASSESSMENT AND PLAN:


62yo F with PMH GERD, nugraubes abd rectal carcinoid presented to the ER and 

was admitted for further evaluation of their emergent condition


1. C4-C5 discitis and osteomyelitis- s/p anterior cervical decompression and 

fusion POD#2. c-collar in place. dressing intact. c-spine XR pending. bedrest 

and c-collar placement per neurosurgery. will need long term abx. currently on 

Vanco/ceftriaxone will need long term abx. awaiting WCx to result to determine. 

ID, neuro and neurosurgery on board. pain control


2. Pre-diabetic- A1c 6.6. will initiate lifestyle changes. will require repeat 

in 3 months. diabetic counseling


3. normocytic anemia- s/p 1 unit PRBC this admission. no signs of active 

bleeding. minimal bleeding during surgery. will check iron studies


4. dvt ppx- scd. re-start pharmacologic prophylaxis per neurosurgery

## 2017-04-18 NOTE — PN
Progress Note (short form)





- Note


Progress Note: 


NEUROSURGERY 





POD #1


Collar irritating scalp posteriorly


PE: AF, VSS


Some incisional pain


Scalp with no erosion but some anterior neck/chin skin abrasion


Motor B UE/ LE 4+-5 B; good UE ROM


Sensation intact LT


Dressing intact- no drainage


WBC 6.8;  Hgb 10.2; Cr 0.9


Ordered fungal and AFB cultures also-called lab yesterday to confirm also


On Vanco and Ceftriaxone for now- ID to manage iv abx long term


Intra-op findings  reviewed with family


Bacitracin 


Skin care


C spine x-rays today

## 2017-04-18 NOTE — PN
Progress Note (short form)





- Note


Progress Note: 


Anesthesia postop note





62y/o F s/p GA for ACDF 


POD#1, vss, aaox3, pain fairly well controlled


No anesthesia complications.

## 2017-04-18 NOTE — PN
Progress Note, Physician


History of Present Illness: 


s/p cervical decompression surgery 


doing well, despite mild pain 


discomfort with neck brace 


able to move all exe 


no fevers 











- Current Medication List


Current Medications: 


Active Medications





Ceftriaxone Sodium (Rocephin 1gm Ivpb (Pre-Docked))  1 gm IVPB DAILY [facility]


Diazepam (Valium -)  5 mg PO TID RAJ


   Last Admin: 04/18/17 05:59 Dose:  5 mg


Docusate Sodium (Colace -)  100 mg PO TID RAJ


   Last Admin: 04/18/17 05:59 Dose:  100 mg


Vancomycin HCl (Vancomycin (Pre-Docked))  250 mls @ 250 mls/hr IVPB BID RAJ


   PRN Reason: Protocol


Potassium Chloride/Sodium Chloride (1/2ns+20meq Kcl)  1,000 mls @ 75 mls/hr IV 

ASDIR RAJ


   Last Admin: 04/17/17 21:04 Dose:  75 mls/hr


Montelukast Sodium (Singulair -)  10 mg PO HS RAJ


   Last Admin: 04/17/17 21:09 Dose:  10 mg


Oxycodone HCl (Roxicodone -)  5 mg PO Q6H PRN


   PRN Reason: PAIN


   Last Admin: 04/18/17 02:09 Dose:  5 mg


Topiramate (Topamax -)  50 mg PO DAILY [facility]











- Objective


Vital Signs: 


 Vital Signs











Temperature  98.9 F   04/18/17 07:06


 


Pulse Rate  76   04/18/17 07:06


 


Respiratory Rate  20   04/18/17 07:06


 


Blood Pressure  118/66   04/18/17 07:06


 


O2 Sat by Pulse Oximetry (%)  98   04/17/17 21:00











Neurological: Yes: Other (moving UE , minor weakness of the right deltoid has 

improved ( 5-/5), rest appaers 5/5, limited ROM ogf th left hip s/p graft , 

reduced R Bi /BR reflex, 2+ TR BL, plantars downgoing)


Labs: 


 CBC, BMP





 04/17/17 17:05 





 04/17/17 17:05 





 INR, PTT











INR  1.01  (0.82-1.09)   04/15/17  12:49    














Problem List





- Problems


(1) Osteomyelitis


Code(s): M86.9 - OSTEOMYELITIS, UNSPECIFIED   Qualifiers: 


     Osteomyelitis type: subacute     Osteomyelitis location: other site        

Qualified Code(s): M86.28 - Subacute osteomyelitis, other site  





(2) Cervical discitis


Code(s): M46.42 - DISCITIS, UNSPECIFIED, CERVICAL REGION








Assessment/Plan


cervical diskitis C4-C5, recent bacteremia 


s/p decompression on 4/17 POD 1 


neurologically stable and minor weakness of the right UE improved


no signs of myelopathy


await cultures to decide on long term ABX regimen


pain control  PRN oxycodone 





Dr Olivera 


[phone]

## 2017-04-18 NOTE — PN
Progress Note, Physician


History of Present Illness: 


patient post op


slightly groggy


feels well


no complaint


neck in collar





- Current Medication List


Current Medications: 


Active Medications





Acetaminophen (Tylenol -)  650 mg PO Q4H PRN


   PRN Reason: HEADACHE


Bacitracin/Polymyxin B Sulfate (Polysporin Ointment -)  1 applic TP DAILY [facility]


Ceftriaxone Sodium (Rocephin 1gm Ivpb (Pre-Docked))  1 gm IVPB DAILY [facility]


   Last Admin: 04/18/17 09:35 Dose:  1 gm


Diazepam (Valium -)  5 mg PO TID [facility]


   Last Admin: 04/18/17 05:59 Dose:  5 mg


Docusate Sodium (Colace -)  100 mg PO TID [facility]


   Last Admin: 04/18/17 05:59 Dose:  100 mg


Vancomycin HCl (Vancomycin (Pre-Docked))  250 mls @ 250 mls/hr IVPB BID [facility]


   PRN Reason: Protocol


Potassium Chloride/Sodium Chloride (1/2ns+20meq Kcl)  1,000 mls @ 75 mls/hr IV 

ASDIR [facility]


   Last Admin: 04/18/17 09:36 Dose:  75 mls/hr


Montelukast Sodium (Singulair -)  10 mg PO HS [facility]


   Last Admin: 04/17/17 21:09 Dose:  10 mg


Oxycodone HCl (Roxicodone -)  5 mg PO Q6H PRN


   PRN Reason: PAIN


   Last Admin: 04/18/17 09:33 Dose:  5 mg


Topiramate (Topamax -)  50 mg PO DAILY [facility]


   Last Admin: 04/18/17 09:34 Dose:  50 mg











- Objective


Vital Signs: 


 Vital Signs











Temperature  99.3 F   04/18/17 10:17


 


Pulse Rate  82   04/18/17 10:17


 


Respiratory Rate  19   04/18/17 10:17


 


Blood Pressure  124/66   04/18/17 10:17


 


O2 Sat by Pulse Oximetry (%)  98   04/17/17 21:00











Constitutional: Yes: Calm


HENT: Yes: Atraumatic


Neck: Yes: Supple


Cardiovascular: Yes: Regular Rate and Rhythm


Respiratory: Yes: Regular, CTA Bilaterally


Gastrointestinal: Yes: Normal Bowel Sounds, Soft


Musculoskeletal: Yes: WNL


Extremities: Yes: WNL


Neurological: Yes: Alert, Oriented


Psychiatric: Yes: Alert, Oriented


Labs: 


 CBC, BMP





 04/17/17 17:05 





 04/17/17 17:05 





 INR, PTT











INR  1.01  (0.82-1.09)   04/15/17  12:49    














Assessment/Plan


osteo of cervical vertebra


pain





plan


post op


patient given vanco and ceftriaxone


all cx send


will continue ceftriaxone for now

## 2017-04-18 NOTE — OP
DATE OF OPERATION:  04/17/2017

 

PREOPERATIVE DIAGNOSIS:  

1.  C4-C5 diskitis with osteomyelitis.  

2.  Cervical stenosis.

3.  Cervical radiculopathy.

4.  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 

5.  Borderline diabetes.

6.  Osteoporosis/osteopenia

 

ATTENDING SURGEON:  Phan Trotter MD

 

ASSISTANT:  KAMILA Tineo

 

ANESTHESIA:  General endotracheal 

 

ANESTHESIOLOGIST:  Natalia Myles MD

 

ESTIMATED BLOOD LOSS:  100 mL.

 

PROCEDURE:

1.  Preoperative placement and postoperative removal of all cranial traction 
towels

for intraoperative traction (51287).

2.  Anterior cervical discectomy C4-C5.

3.  Partial corpectomy at C4 and C5 for debridement and decompression at the 
cervical

roof at C4 and C5, biopsy of C4 and C5 (18955, 03033).

4.  [place] of left anterior iliac crest tricortical bone graft (09094)

5.  Anterior cervical interbody fusion C4-C5 (87676).

6.  Anterior cervical instrumentation C4-C5 with [place] Spine Aviator 14-mm 
titanium

plate and 14-mm titanium screws at C4-C5 (98106).

7.  Microsurgical dissection for operative microscope including microsurgical

techniques (10326).

 

 FINDINGS:  

1.  Extremely fibrotic edematous anterior prevertebral tissues.  

2.  Necrotic C4-C5 disc material with severe disc space narrowing.  

3.  Erosion in the C5 anterior vertebral body.  

 

INDICATIONS:  The patient is a 61-year-old female with history of borderline

diabetes, carcinoma tumor, and osteoporosis, who complains of approximately 1-
month

history of neck pain, right arm weakness, and left scapular pain.  She has also

developed low-grade temperature and eventually spiked a fever about 2 weeks ago 
when

she was admitted for possible urosepsis.  She was treated and was defervesced 
and was

discharged home.  She now presents with worsening neck pain and MRI of the 
cervical

spine demonstrated diskitis and osteomyelitis.  She was admitted through the

emergency room and is now consented for anterior cervical debridement, 
decompression,

fusion, instrumentation, with left anterior iliac crest bone graft.  The patient

understands the risks of surgery include, but are not limited to, bleeding,

infection, dural tear with CSF leak, neurological injury, increased 
thromboembolic

risks, hoarseness, swallowing difficulty, nonunion, effusion, non-diagnostic 
tissues,

and other risks of general anesthesia.  The patient understands the indications 
for

the procedure, the procedure in detail, risks and benefits, and alternatives for

treatment of her cervical spine condition, and she wished to proceed.  No 
guarantees

were given for a favorable outcome.  

 

PROCEDURE IN DETAIL:  After the patient was taken to the operating room, she was

placed in supine position.  After general anesthesia was induced and appropriate

monitoring lines were placed, the arms were tucked on the side, SSEP and EMG

electrodes were placed by technician.  The head was secured on the [place]

in the usual position.  The cranial retraction towels were applied with 
Bacitracin

ointment.  No traction weight was placed until baseline SSEP, EMG, and MEP 
signals

were obtained, at which time a 5-pound traction weight was placed.  Anterior

left-sided cervical area was cleaned with alcohol, prepped with Betadine, as 
was the

left anterior iliac crest area.  Two sterile fields were draped.  

 

At this point, the left-sided anterior cervical incision was first opened with 
No. 15

blade after localization was obtained.  A self-retaining retractor was 
inserted.  The

platysmas muscle was split longitudinally with Metzenbaum scissors.  Dissection

proceeded medial to the carotid sheath, and lateral to the trachea and 
esophagus.  At

this point, anterior prevertebral edema and fibrosis was noted.  The longus coli

muscle was reflected laterally with periosteal elevator and modified and 
coagulated

with bipolar electrocautery.  A needle was inserted into the C4-C5 disc space.  
It

was markedly collapsed.  However, there was almost no resistance into 
intervertebral

disc space at all.  After position was verified, this angle was incised with a 
No. 15

blade and thickened prevertebral fascia was removed from the bottom of 
vertebral body

of C4 and top of vertebral body of C5.  The disc space was almost completely 
devoid

of disc material.  Whatever has remained and the end-plate were removed with 
angled

curette and pituitary rongeur, and was sent for frozen section as well as 
permanent

section.  Swab culture as well as tissue culture were sent from the disc 
material in

the disc space.  

 

At this point, partial corpectomy of bottom vertebra of C4 and top vertebra of 
C5

was performed.  The partial corpectomies were carried out with a combination of

curettes as well as high-speed pneumatic drill.  Bilateral foraminotomy was 
carried 

out at C4-C5 with angled curettes and Kerrison.  The posterior longitudinal 
ligament was

also extremely edematous and was dissected with the angle curette and resected 
with

the Kerrison rongeur.  After the foraminotomy was complete with Kerrison rongeur
,

epidural hemostasis was obtained with thrombin-soaked powdered Gelfoam and 
bipolar

electrocautery.  The AP diameter vertebral body was approximately 15 mm, and the

height was about 7.5 mm.  There is significant erosion of the top anterior 
portion of

the C5 vertebral body.  Fair amount of debridement was carried out there as 
well.

There was some soft tissue present, which was also sent for pathology frozen

section and was consistent mostly with inflammatory tissues only and no tumor. 

Therefore, it was decided to preserve what was left of the C5 vertebral body.  

Decompression was performed with the use of the operating microscope for 

illumination and magnification. Microsurgical techniques were utilized.

 

Left anterior iliac crest incision was opened and iliac crest was isolated

bilaterally with Monopolar electrocautery and periosteal elevator.  A piece of 
an

8-mm high and 10-mm AP diameter tricortical bone graft was harvested with 
osteotome. 

It was soaked in antibiotic irrigation to be used later on.  After it was 
soaked for

about 5 minutes, the interbody implant was trimmed down to appropriate size

with a rongeur to be used for interbody fusion at the C4-C5 level.  Under 
manual 

traction, the corticocancellous tricortical bone graft was inserted and 
countersunk 

by about 5-6 mm.  This was necessary because the anterior superior portion of 

the vertebral body at C5 had been eroded.  This area was then packed with 
[place] and

autologous bone/bone graft which were harvested from the iliac crest.  A 14-mm

[place] Aviator titanium plate was secured with fixation pins and 14-mm self-
drilling

screws used at C4 and C5.  Bicortical purchase and rescue screws were used 
because 

of the patients osteopenia/osteoporosis.  A regular self-tapping screw was used 
on 

the left C4 with some higher cephalad angle in order to avoid the interbody 
space.  

The locking mechanism was engaged.  Because the right-sided C5 locking 
mechanism was 

not engaging properly, it was removed as a precaution.  The screws all had 
satisfactory 

bone purchase upon insertion.  The wounds were copiously irrigated with 
antibiotic irrigation after

cultures were taken.  

 

At this point, final bilateral cervical spine x-rays were obtained demonstrating

satisfactory position of implants.  Both the cervical incision as well as left

anterior iliac crest incision was irrigated with copious amount antibiotic 
containing

irrigation.  Good hemostasis was obtained.  The fascia in the left anterior 
iliac

crest region was closed with 0 Vicryl suture and subcutaneous fascia was closed 
with

0 Vicryl sutures.  Skin was closed with 4-0 Vicryl running subcuticular 
sutures. 

Steri-Strips and sterile occlusive dressing were applied.  

 

For the left-sided anterior cervical incision, a piece of the Surgicel was laid 
on the 

dissection track. There was no injury to the esophagus, trachea or  carotid 
sheath.  

The platysma was approximated with 3-0 Vicryl suture as well as subcuticular 
fascia.  

Skin was closed with 4-0 Vicryl running subcuticular sutures, also.  Steri-
Strips and 

a sterile occlusive dressing were applied.  SSEP, EMG, and MEP signals remained 

stable throughout. She was placed in a [place] collar before being 
extubated.

 

The patient tolerated the procedure well, was extubated in the operating room.  
She

was moving bilateral upper and lower extremities while in the recovery room.  
All

needle and lap counts were correct.  Gram stain and culture as well as tissue 
culture

were sent, in addition to routine pathology.  The patient received one dose of 
one gram

of Vancomycin and one gram of Rocephin.

 

The OR time-out procedure was followed.  The patient was placed in a rigid 
cervical

collar before being awakened without a problem from general anesthesia.  The 
family

was updated as to the intraoperative findings, as well as the patients 
postoperative

condition.  

 



PHAN TROTTER M.D.

 

[id number]

DD: 04/17/2017 17:06

DT: 04/18/2017 09:51

Job #:  [id number]

MTDD

## 2017-04-18 NOTE — PN
Physical Exam: 


SUBJECTIVE: Patient seen and examined, sitting up in bed, with neck brace, 

slightly groggy s/p pain medication. C/o neck and shoulder pain bilaterally








OBJECTIVE:





 Vital Signs











 Period  Temp  Pulse  Resp  BP Sys/Bethea  Pulse Ox


 


 Last 24 Hr  97.9 F-100.1 F    14-20  114-142/60-90  











GENERAL: The patient is awake, alert, and fully oriented, in no acute distress.


HEAD: Normal with no signs of trauma.


EYES: PERRL, extraocular movements intact, sclera anicteric, conjunctiva clear. 

No ptosis. 


ENT: Ears normal, nares patent, oropharynx clear without exudates, moist mucous 


membranes.


NECK: in neck brace; Trachea midline, full range of motion, supple. dressing in 

tact


LUNGS: Breath sounds equal, clear to auscultation bilaterally, no wheezes, no 

crackles, no 


accessory muscle use. 


HEART: Regular rate and rhythm, S1, S2 without murmur, rub or gallop.


ABDOMEN: Soft, nontender, nondistended, normoactive bowel sounds, no guarding, 

no 


rebound, no hepatosplenomegaly, no masses.


EXTREMITIES: 2+ pulses, warm, well-perfused, no edema. 


NEUROLOGICAL: Cranial nerves II through XII grossly intact. Normal speech, gait 

not 


observed.


PSYCH: Normal mood, normal affect.


SKIN: Warm, dry, normal turgor, no rashes or lesions noted














 Laboratory Results - last 24 hr











  04/16/17 04/17/17 04/17/17





  10:07 17:05 17:05


 


WBC   6.8  D 


 


RBC   3.34 L 


 


Hgb   10.2 L 


 


Hct   31.0 L 


 


MCV   92.8 


 


MCHC   32.8 


 


RDW   16.6 H 


 


Plt Count   185 


 


MPV   6.9 L 


 


Sodium    144


 


Potassium    4.4


 


Chloride    109 H


 


Carbon Dioxide    27  D


 


Anion Gap    8


 


BUN    11  D


 


Creatinine    0.9


 


Creat Clearance w eGFR    > 60


 


Random Glucose    119 H


 


Calcium    9.1


 


Total Bilirubin    0.4  D


 


AST    15  D


 


ALT    18


 


Alkaline Phosphatase    103


 


Total Protein    6.8


 


Albumin    3.0 L


 


Crossmatch IS Only  See Detail  








Active Medications











Generic Name Dose Route Start Last Admin





  Trade Name Freq  PRN Reason Stop Dose Admin


 


Acetaminophen  650 mg 04/18/17 12:35 04/18/17 16:14





  Tylenol -  PO   650 mg





  Q4H PRN   Administration





  HEADACHE   


 


Bacitracin/Polymyxin B Sulfate  1 applic 04/18/17 10:00 04/18/17 12:10





  Polysporin Ointment -  TP   1 applic





  DAILY RAJ   Administration


 


Ceftriaxone Sodium  1 gm 04/18/17 10:00 04/18/17 09:35





  Rocephin 1gm Ivpb (Pre-Docked)  IVPB   1 gm





  DAILY RAJ   Administration


 


Diazepam  5 mg 04/17/17 22:00 04/18/17 14:04





  Valium -  PO   5 mg





  TID RAJ   Administration


 


Docusate Sodium  100 mg 04/17/17 22:00 04/18/17 14:04





  Colace -  PO   100 mg





  TID RAJ   Administration


 


Potassium Chloride/Sodium Chloride  1,000 mls @ 75 mls/hr 04/17/17 16:38 04/18/ 17 09:36





  1/2ns+20meq Kcl  IV   75 mls/hr





  ASDIR RAJ   Administration


 


Montelukast Sodium  10 mg 04/17/17 22:00 04/17/17 21:09





  Singulair -  PO   10 mg





  HS RAJ   Administration


 


Oxycodone HCl  5 mg 04/17/17 16:38 04/18/17 09:33





  Roxicodone -  PO   5 mg





  Q6H PRN   Administration





  PAIN   


 


Topiramate  50 mg 04/18/17 10:00 04/18/17 09:34





  Topamax -  PO   50 mg





  DAILY RAJ   Administration





IMAGING: 


 MRI of the neck showed: C4-5 discitis/osteomyelitis; anterior epidural soft 

tissue thickening; posterior cervical ligamentus edema C4-5; prevertebral soft 

tissue 


  swelling; + enhancement.





ASSESSMENT/PLAN:





Patient is a 62 year old female with significant past medical history of 

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 

presented to the ED with the chief complaints of neck and bilateral shoulder 

pain. Recently discharged from the hospital for sepsis secondary to UTI (

proteus mirabilis bateremia) on 4/6/17.





#Osteomyelitis of C4/C5 sp cervical decompression surgery POD1


-cont  IV Ceftriaxone 1gm daily


-culture growing proteus; await sensitivities


-c spine today showing widened prevertebral soft tissues with some soft tissue 

air: wnl


-follow neurology recommendations





# GERD- no active symptoms


   


# Migraine-


   No complaints of headache





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





# FEN


   IV NS @ 75mls/hr


   Electrolytes to be repeated


   NPO





DVT prophylaxis: scds; hold ac until cleared by neuro





Disposition: s/p surgery 





Visit type





- Emergency Visit


Emergency Visit: Yes


ED Registration Date: 04/15/17


Care time: The patient presented to the Emergency Department on the above date 

and was hospitalized for further evaluation of their emergent condition.





- New Patient


This patient is new to me today: Yes


Date on this admission: 04/18/17





- Critical Care


Critical Care patient: No

## 2017-04-19 LAB
ALBUMIN SERPL-MCNC: 2.9 G/DL (ref 3.4–5)
ALP SERPL-CCNC: 121 U/L (ref 45–117)
ALT SERPL-CCNC: 31 U/L (ref 12–78)
ANION GAP SERPL CALC-SCNC: 6 MMOL/L (ref 8–16)
AST SERPL-CCNC: 27 U/L (ref 15–37)
BASOPHILS # BLD: 0.9 % (ref 0–2)
BILIRUB SERPL-MCNC: 0.7 MG/DL (ref 0.2–1)
CALCIUM SERPL-MCNC: 8.5 MG/DL (ref 8.5–10.1)
CO2 SERPL-SCNC: 28 MMOL/L (ref 21–32)
COCKROFT - GAULT: 81.59
CREAT SERPL-MCNC: 0.8 MG/DL (ref 0.55–1.02)
DEPRECATED RDW RBC AUTO: 16 % (ref 11.6–15.6)
EOSINOPHIL # BLD: 1.1 % (ref 0–4.5)
GLUCOSE SERPL-MCNC: 130 MG/DL (ref 74–106)
MCH RBC QN AUTO: 31.1 PG (ref 25.7–33.7)
MCHC RBC AUTO-ENTMCNC: 33.7 G/DL (ref 32–36)
MCV RBC: 92.2 FL (ref 80–96)
NEUTROPHILS # BLD: 76.2 % (ref 42.8–82.8)
PLATELET # BLD AUTO: 169 K/MM3 (ref 134–434)
PMV BLD: 7.1 FL (ref 7.5–11.1)
PROT SERPL-MCNC: 6.5 G/DL (ref 6.4–8.2)
WBC # BLD AUTO: 6.3 K/MM3 (ref 4–10)

## 2017-04-19 RX ADMIN — DOCUSATE SODIUM SCH MG: 100 CAPSULE, LIQUID FILLED ORAL at 21:22

## 2017-04-19 RX ADMIN — DOCUSATE SODIUM SCH MG: 100 CAPSULE, LIQUID FILLED ORAL at 14:03

## 2017-04-19 RX ADMIN — ACETAMINOPHEN PRN MG: 325 TABLET ORAL at 09:20

## 2017-04-19 RX ADMIN — DOCUSATE SODIUM SCH MG: 100 CAPSULE, LIQUID FILLED ORAL at 06:09

## 2017-04-19 RX ADMIN — MONTELUKAST SODIUM SCH MG: 10 TABLET, COATED ORAL at 21:23

## 2017-04-19 RX ADMIN — CEFTRIAXONE SCH GM: 1 INJECTION, POWDER, FOR SOLUTION INTRAMUSCULAR; INTRAVENOUS at 09:18

## 2017-04-19 RX ADMIN — TOPIRAMATE SCH MG: 25 TABLET, FILM COATED ORAL at 09:19

## 2017-04-19 RX ADMIN — Medication SCH APPLIC: at 09:18

## 2017-04-19 NOTE — PN
Progress Note (short form)





- Note


Progress Note: 


Progress Note, Physician


History of Present Illness: 


s/p cervical decompression surgery POD day 2


doing well , though was in pain today 


discomfort with neck brace 


able to move all exe 


no fevers 








- Current Medication List


Current Medications: 


Active Medications





Ceftriaxone Sodium (Rocephin 1gm Ivpb (Pre-Docked))  1 gm IVPB DAILY RAJ


Diazepam (Valium -)  5 mg PO TID RAJ


   Last Admin: 04/18/17 05:59 Dose:  5 mg


Docusate Sodium (Colace -)  100 mg PO TID RAJ


   Last Admin: 04/18/17 05:59 Dose:  100 mg


Vancomycin HCl (Vancomycin (Pre-Docked))  250 mls @ 250 mls/hr IVPB BID RAJ


   PRN Reason: Protocol


Potassium Chloride/Sodium Chloride (1/2ns+20meq Kcl)  1,000 mls @ 75 mls/hr IV 

ASDIR RAJ


   Last Admin: 04/17/17 21:04 Dose:  75 mls/hr


Montelukast Sodium (Singulair -)  10 mg PO HS RAJ


   Last Admin: 04/17/17 21:09 Dose:  10 mg


Oxycodone HCl (Roxicodone -)  5 mg PO Q6H PRN


   PRN Reason: PAIN


   Last Admin: 04/18/17 02:09 Dose:  5 mg


Topiramate (Topamax -)  50 mg PO DAILY [facility]











- Objective


Vital Signs: afebrile 


 





Neurological: Yes: Other (moving UE , appaers 5/5, 


limited ROM ogf th left hip s/p graft , reduced R Bi /BR reflex, 2+ TR BL, 

plantars downgoing)


Labs: 


 CBC, BMP





 04/17/17 17:05 





 04/17/17 17:05 





 INR, PTT











INR  1.01  (0.82-1.09)   04/15/17  12:49    














Problem List





- Problems


(1) Osteomyelitis


Code(s): M86.9 - OSTEOMYELITIS, UNSPECIFIED   Qualifiers: 


     Osteomyelitis type: subacute     Osteomyelitis location: other site        

Qualified Code(s): M86.28 - Subacute osteomyelitis, other site  





(2) Cervical discitis


Code(s): M46.42 - DISCITIS, UNSPECIFIED, CERVICAL REGION








Assessment/Plan


cervical diskitis C4-C5, recent bacteremia 


s/p decompression on 4/17 POD 2


neurologically stable and no deficits on neuro exam 


no signs of myelopathy


FU CLTX , for sesntivities and ABX regimen


pain control  PRN oxycodone /valium PRN, colace for BM


plan for rehab





Dr Olivera 


[phone]

















Problem List





- Problems


(1) Osteomyelitis


Code(s): M86.9 - OSTEOMYELITIS, UNSPECIFIED   Qualifiers: 


     Osteomyelitis type: subacute     Osteomyelitis location: other site        

Qualified Code(s): M86.28 - Subacute osteomyelitis, other site  





(2) Cervical discitis


Code(s): M46.42 - DISCITIS, UNSPECIFIED, CERVICAL REGION

## 2017-04-19 NOTE — PN
Progress Note, Physician


History of Present Illness: 


stable


pain main issue


still in hard collar





- Current Medication List


Current Medications: 


Active Medications





Acetaminophen (Tylenol -)  650 mg PO Q4H PRN


   PRN Reason: HEADACHE


   Last Admin: 04/19/17 09:20 Dose:  650 mg


Bacitracin/Polymyxin B Sulfate (Polysporin Ointment -)  1 applic TP DAILY [facility]


   Last Admin: 04/19/17 09:18 Dose:  1 applic


Ceftriaxone Sodium (Rocephin 1gm Ivpb (Pre-Docked))  1 gm IVPB DAILY [facility]


   Last Admin: 04/19/17 09:18 Dose:  1 gm


Diazepam (Valium -)  5 mg PO TID [facility]


   Last Admin: 04/19/17 06:15 Dose:  5 mg


Docusate Sodium (Colace -)  100 mg PO TID [facility]


   Last Admin: 04/19/17 06:09 Dose:  100 mg


Potassium Chloride/Sodium Chloride (1/2ns+20meq Kcl)  1,000 mls @ 75 mls/hr IV 

ASDIR [facility]


   Last Admin: 04/18/17 18:51 Dose:  Not Given


Montelukast Sodium (Singulair -)  10 mg PO HS [facility]


   Last Admin: 04/18/17 21:06 Dose:  10 mg


Oxycodone HCl (Roxicodone -)  5 mg PO Q6H PRN


   PRN Reason: PAIN


   Last Admin: 04/19/17 06:15 Dose:  5 mg


Topiramate (Topamax -)  50 mg PO DAILY [facility]


   Last Admin: 04/19/17 09:19 Dose:  50 mg











- Objective


Vital Signs: 


 Vital Signs











Temperature  99.6 F   04/19/17 08:34


 


Pulse Rate  90   04/19/17 08:34


 


Respiratory Rate  20   04/19/17 08:34


 


Blood Pressure  122/78   04/19/17 08:34


 


O2 Sat by Pulse Oximetry (%)  97   04/18/17 21:00











Constitutional: Yes: Calm, Mild Distress


HENT: Yes: Other (hard collar)


Cardiovascular: Yes: Regular Rate and Rhythm


Respiratory: Yes: Regular, CTA Bilaterally


Gastrointestinal: Yes: Normal Bowel Sounds, Soft


Musculoskeletal: Yes: WNL


Extremities: Yes: WNL


Integumentary: Yes: WNL


Wound/Incision: Yes: Dressing Dry and Intact


Neurological: Yes: Alert, Oriented


Psychiatric: Yes: Alert, Oriented


Labs: 


 CBC, BMP





 04/19/17 06:30 





 04/19/17 06:20 





 INR, PTT











INR  1.01  (0.82-1.09)   04/15/17  12:49    














Assessment/Plan


osteo of cervical vertebra


pain





plan


await for surgery


proteus in tissue


sensitivities noted


she will need abx for 6-8 weeks

## 2017-04-19 NOTE — PN
Progress Note (short form)





- Note


Progress Note: 


NEUROSURGERY 





POD #2


Collar irritating scalp posteriorly and chin anteriorly


Had regular for breakfast


PE: Tmax 99.6, AF, VSS


Some incisional pain


Scalp with no erosion 


Anterior neck/chin skin abrasion better


Motor B UE/ LE 4+-5 B; good UE ROM


Sensation intact LT


Dressing intact- no drainage


C spine x-rays: satisfactory position of C4-5 implants


On Ceftriaxone per ID


ID to manage iv abx long term


Need PIC line prior to discharge


Intra-op findings  reviewed with family


Bacitracin-polymyxin


Skin care


Switch to soft collar temporarily given difficulty tolerating [place] 

collar


Outpatient Rx given for [place] collar

## 2017-04-19 NOTE — PN
Teaching Attending Note


Name of Resident: Miryam Olivas


ATTENDING PHYSICIAN STATEMENT





I saw and evaluated the patient.


I reviewed the resident's note and discussed the case with the resident.


I agree with the resident's findings and plan as documented.








SUBJECTIVE:c/o HA since this AM. states pain is more due to uncomfortable c-

collar. states only minor relief with pain medication. denies CP, SOB,fever, 

chills, N/V/C/D, blurred vision








OBJECTIVE:





 Last Vital Signs











Temp Pulse Resp BP Pulse Ox


 


 99.6 F   90   20   122/78   97 


 


 04/19/17 08:34  04/19/17 08:34  04/19/17 08:34  04/19/17 08:34  04/18/17 21:00








General NAD, c-collar in place dressing c/d/i


CV S1 S2 RRR no murmur/rub/gallop


Lungs CTA B/L no wheezing/rales/rhonchi





ASSESSMENT AND PLAN:


60yo F with PMH GERD, nugraubes abd rectal carcinoid presented to the ER and 

was admitted for further evaluation of their emergent condition


1. C4-C5 discitis and osteomyelitis- s/p anterior cervical decompression and 

fusion on 4/17. Cx growing proteus with pan-sensitivity. on Ceftriaxone will 

need abx for 6-8 weeks. c-collar switched by neurosurgery. will need on 

discharge. PT assessment today. start morphine prn pain for next 24H to achieve 

relief then titrate down to po. stool softeners. if no BM by tomorrow will give 

enema. ID, neuro and neurosurgery on board. 


2. Pre-diabetic- A1c 6.6. will initiate lifestyle changes. will require repeat 

in 3 months. diabetic counseling


3. normocytic anemia- s/p 1 unit PRBC this admission. no signs of active 

bleeding. minimal bleeding during surgery.  iron studies


4. dvt ppx- scd. re-start pharmacologic prophylaxis per neurosurgery

## 2017-04-19 NOTE — PATH
Surgical Pathology Report



Patient Name:  KYLER TREVIZO

Accession #:  [id number]

Med. Rec. #:  [id number]                                                        

   /Age/Gender:  1955 (Age: 61) / F

Account:  [id number]                                                          

             Location: [facility] MED/SURG

Taken:  2017

Received:  2017

Reported:  2017

Physicians:  KENZIE Hartley

  



Specimen(s) Received

A: C4-C5 TISSUE-FROZEN 

B: C5 VERTEBRAL LESION BIOPSY-FROZEN 

C: C4-C5 DISC 

D: C5 VERTEBRA 





Clinical History

Osteomyelitis





Intraoperative Consult Diagnosis

A. C4-5 tissue: Bone and soft tissue with inflammatory infiltrate.

B. C5 vertebral lesion: Soft tissue and bone with inflammatory infiltrate. Defer

to permanent for final interpretation.

A. Finkelstein, 17 









Final Diagnosis

A. BONE AND SOFT TISSUE, C4-C5, BIOPSY:  

BONE AND ADJACENT SOFT TISSUE WITH ACUTE AND CHRONIC INFLAMMATION.

NO MALIGNANCY IDENTIFIED IN THE EXAMINED MATERIAL.

SEE COMMENT.



B. C5 VERTEBRAL LESION, BIOPSY:  

BONE AND SOFT TISSUE WITH ACUTE AND CHRONIC INFLAMMATION.

NO MALIGNANCY IDENTIFIED IN THE EXAMINED MATERIAL.

SEE COMMENT.



C. INTERVERTEBRAL DISC, C4-C5, ANTERIOR CERVICAL DECOMPRESSION FUSION:  

BONE AND CARTILAGE WITH ACUTE AND CHRONIC INFLAMMATION.

NO MALIGNANCY IDENTIFIED IN THE EXAMINED MATERIAL.

SEE COMMENT.



D. C5 VERTEBRA, BIOPSY:  

FRAGMENTS OF BONE WITH ACUTE AND CHRONIC INFLAMMATION.

NO MALIGNANCY IDENTIFIED IN THE EXAMINED MATERIAL.

SEE COMMENT.



Comment: The histologic findings are compatible with acute and chronic

osteomyelitis/discitis.  

Reported history of rectal carcinoid tumor is noted. Immunohistochemical stains

performed and interpreted on blocks A2 and B1 at the [facility] show no specific reactivity for Ae1/Ae3 keratin, synaptophysin and

chromogranin. These results are supportive of the interpretation above. Clinical

and imaging correlations are suggested.





***Electronically Signed***

Alexander Finkelstein, M.D.





Gross Description

A. Received fresh labeled "C4-5 tissue frozen" and a 1.7 x 1.2 x 0.3 cm

aggregate of tan soft tissue and bone fragments. The softer portions are

submitted for frozen section. The specimen is entirely submitted in 2 cassettes

as follows: 1-frozen section residue; 2-remaining bone fragments, following

decalcification.



B. Received fresh labeled "C5 vertebral lesion frozen" is a 0.3 cm in greatest

dimension tan soft tissue fragment. The specimen is submitted in total in one

cassette.



C. Received in formalin labeled "C4-5 disc" is a 0.9 x 0.7 x 0.2 cm aggregate of

tan fragments of fibrocartilaginous tissue and possible bone. The specimen is

entirely submitted in one cassette, following decalcification.



D. Received in formalin labeled "C5 vertebrae" is a 0.1 cm in greatest dimension

tan fragment of possible bone. The specimen is submitted in toto in one

cassette, following decalcification.





[facility]

## 2017-04-19 NOTE — PN
Physical Exam: 


SUBJECTIVE: Patient seen and examined, pain due to nack brace; hard collar very 

uncomfortable. Some pain still from graft site. Afebrile. Has not had Bm since 

surgery








OBJECTIVE:





 Vital Signs











 Period  Temp  Pulse  Resp  BP Sys/Bethea  Pulse Ox


 


 Last 24 Hr  97.9 F-99.6 F  79-92  16-20  111-128/59-78  97-97











GENERAL: The patient is awake, alert, and fully oriented, in no acute distress.


HEAD: Normal with no signs of trauma.


NECK: Trachea midline, on neck brace; dressing intact


LUNGS: Breath sounds equal, clear to auscultation bilaterally, no wheezes, no 

crackles, no 


accessory muscle use. 


HEART: Regular rate and rhythm, S1, S2 without murmur, rub or gallop.


ABDOMEN: Soft, nontender, nondistended, normoactive bowel sounds, no guarding, 

no 


rebound, no hepatosplenomegaly, no masses.


EXTREMITIES: 2+ pulses, warm, well-perfused, no edema. 


NEUROLOGICAL: Cranial nerves II through XII grossly intact. Normal speech, gait 

not 


observed.


PSYCH: Normal mood, normal affect.


SKIN: Warm, dry, normal turgor, no rashes or lesions noted














 Laboratory Results - last 24 hr











  04/16/17 04/19/17 04/19/17





  10:07 06:20 06:20


 


WBC   


 


RBC   


 


Hgb   


 


Hct   


 


MCV   


 


MCHC   


 


RDW   


 


Plt Count   


 


MPV   


 


Neutrophils %   


 


Lymphocytes %   


 


Monocytes %   


 


Eosinophils %   


 


Basophils %   


 


Sodium   141 


 


Potassium   4.0 


 


Chloride   107 


 


Carbon Dioxide   28 


 


Anion Gap   6 L 


 


BUN   9 


 


Creatinine   0.8 


 


Creat Clearance w eGFR   > 60 


 


Random Glucose   130 H 


 


Calcium   8.5 


 


Ferritin    176.494


 


Total Bilirubin   0.7  D 


 


AST   27  D 


 


ALT   31  D 


 


Alkaline Phosphatase   121 H 


 


Total Protein   6.5 


 


Albumin   2.9 L 


 


Blood Type  A POSITIVE  


 


Crossmatch IS Only  See Detail  














  04/19/17





  06:30


 


WBC  6.3


 


RBC  3.12 L


 


Hgb  9.7 L


 


Hct  28.7 L


 


MCV  92.2


 


MCHC  33.7


 


RDW  16.0 H


 


Plt Count  169


 


MPV  7.1 L


 


Neutrophils %  76.2


 


Lymphocytes %  13.4  D


 


Monocytes %  8.4


 


Eosinophils %  1.1


 


Basophils %  0.9


 


Sodium 


 


Potassium 


 


Chloride 


 


Carbon Dioxide 


 


Anion Gap 


 


BUN 


 


Creatinine 


 


Creat Clearance w eGFR 


 


Random Glucose 


 


Calcium 


 


Ferritin 


 


Total Bilirubin 


 


AST 


 


ALT 


 


Alkaline Phosphatase 


 


Total Protein 


 


Albumin 


 


Blood Type 


 


Crossmatch IS Only 








Active Medications











Generic Name Dose Route Start Last Admin





  Trade Name Freq  PRN Reason Stop Dose Admin


 


Acetaminophen  650 mg 04/18/17 12:35 04/19/17 09:20





  Tylenol -  PO   650 mg





  Q4H PRN   Administration





  HEADACHE   


 


Bacitracin/Polymyxin B Sulfate  1 applic 04/18/17 10:00 04/19/17 09:18





  Polysporin Ointment -  TP   1 applic





  DAILY RAJ   Administration


 


Ceftriaxone Sodium  1 gm 04/18/17 10:00 04/19/17 09:18





  Rocephin 1gm Ivpb (Pre-Docked)  IVPB   1 gm





  DAILY RAJ   Administration


 


Diazepam  5 mg 04/17/17 22:00 04/19/17 14:03





  Valium -  PO   5 mg





  TID RAJ   Administration


 


Docusate Sodium  100 mg 04/17/17 22:00 04/19/17 14:03





  Colace -  PO   100 mg





  TID RAJ   Administration


 


Montelukast Sodium  10 mg 04/17/17 22:00 04/18/17 21:06





  Singulair -  PO   10 mg





  HS RAJ   Administration


 


Morphine Sulfate  1 mg 04/19/17 11:46 04/19/17 14:53





  Morphine Injection -  IVPUSH   1 mg





  Q4H PRN   Administration





  PAIN   


 


Oxycodone HCl  5 mg 04/17/17 16:38 04/19/17 16:31





  Roxicodone -  PO   5 mg





  Q6H PRN   Administration





  PAIN   


 


Oxycodone HCl  10 mg 04/19/17 18:21 04/19/17 18:27





  Roxicodone -  PO   10 mg





  Q4H PRN   Administration





  PAIN   


 


Topiramate  50 mg 04/18/17 10:00 04/19/17 09:19





  Topamax -  PO   50 mg





  DAILY RAJ   Administration














IMAGING: 


 MRI of the neck showed: C4-5 discitis/osteomyelitis; anterior epidural soft 

tissue thickening; posterior cervical ligamentus edema C4-5; prevertebral soft 

tissue 


  swelling; + enhancement.





ASSESSMENT/PLAN:


Patient is a 62 year old female with significant past medical history of 

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 

presented to the ED with the chief complaints of neck and bilateral shoulder 

pain. Recently discharged from the hospital for sepsis secondary to UTI (

proteus mirabilis bateremia) on 4/6/17.





#Osteomyelitis of C4/C5 sp cervical decompression surgery POD2


-cont  IV Ceftriaxone 1gm daily


-culture growing proteus; pan sensitive; will need 6-8 weeks antibiotics


-c spine showing widened prevertebral soft tissues with some soft tissue air: 

wnl


-follow neurology recommendations





# GERD- no active symptoms


   


# Migraine-no active symptoms


 


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





# FEN


   IV NS @ 75mls/hr


   Electrolytes to be repeated


   NPO





DVT prophylaxis: scds; hold ac until cleared by neuro





Visit type





- Emergency Visit


Emergency Visit: Yes


ED Registration Date: 04/15/17


Care time: The patient presented to the Emergency Department on the above date 

and was hospitalized for further evaluation of their emergent condition.





- New Patient


This patient is new to me today: No





- Critical Care


Critical Care patient: No

## 2017-04-20 LAB
ANION GAP SERPL CALC-SCNC: 8 MMOL/L (ref 8–16)
CALCIUM SERPL-MCNC: 8.6 MG/DL (ref 8.5–10.1)
CO2 SERPL-SCNC: 24 MMOL/L (ref 21–32)
COCKROFT - GAULT: 93.25
CREAT SERPL-MCNC: 0.7 MG/DL (ref 0.55–1.02)
DEPRECATED RDW RBC AUTO: 15.8 % (ref 11.6–15.6)
GLUCOSE SERPL-MCNC: 116 MG/DL (ref 74–106)
IRON SERPL-MCNC: 12 UG/DL (ref 27–139)
MCH RBC QN AUTO: 30.9 PG (ref 25.7–33.7)
MCHC RBC AUTO-ENTMCNC: 33.6 G/DL (ref 32–36)
MCV RBC: 92 FL (ref 80–96)
PLATELET # BLD AUTO: 171 K/MM3 (ref 134–434)
PMV BLD: 7.2 FL (ref 7.5–11.1)
TIBC SERPL-MCNC: 182 UG/DL (ref 250–450)
UIBC SERPL-MCNC: 170 UG/DL (ref 118–369)
WBC # BLD AUTO: 6.2 K/MM3 (ref 4–10)

## 2017-04-20 RX ADMIN — FERROUS SULFATE TAB EC 324 MG (65 MG FE EQUIVALENT) SCH MG: 324 (65 FE) TABLET DELAYED RESPONSE at 18:42

## 2017-04-20 RX ADMIN — MONTELUKAST SODIUM SCH MG: 10 TABLET, COATED ORAL at 21:28

## 2017-04-20 RX ADMIN — ACETAMINOPHEN PRN MG: 325 TABLET ORAL at 14:16

## 2017-04-20 RX ADMIN — POLYETHYLENE GLYCOL 3350 SCH: 17 POWDER, FOR SOLUTION ORAL at 10:09

## 2017-04-20 RX ADMIN — TOPIRAMATE SCH MG: 25 TABLET, FILM COATED ORAL at 10:06

## 2017-04-20 RX ADMIN — CEFTRIAXONE SCH GM: 1 INJECTION, POWDER, FOR SOLUTION INTRAMUSCULAR; INTRAVENOUS at 10:07

## 2017-04-20 RX ADMIN — FERROUS SULFATE TAB EC 324 MG (65 MG FE EQUIVALENT) SCH MG: 324 (65 FE) TABLET DELAYED RESPONSE at 15:08

## 2017-04-20 RX ADMIN — Medication SCH APPLIC: at 10:09

## 2017-04-20 RX ADMIN — DOCUSATE SODIUM SCH MG: 100 CAPSULE, LIQUID FILLED ORAL at 15:09

## 2017-04-20 RX ADMIN — DOCUSATE SODIUM SCH MG: 100 CAPSULE, LIQUID FILLED ORAL at 21:28

## 2017-04-20 RX ADMIN — DOCUSATE SODIUM SCH MG: 100 CAPSULE, LIQUID FILLED ORAL at 06:15

## 2017-04-20 NOTE — PN
Progress Note (short form)





- Note


Progress Note: 


s/p cervical decompression surgery POD day 3


doing well , pain better , some chills /swetas last night but no fever 


+proteus in tissue culture 


able to move all exe 


no fevers 








- Current Medication List


Current Medications: 


Active Medications





Ceftriaxone Sodium (Rocephin 1gm Ivpb (Pre-Docked))  1 gm IVPB DAILY [facility]


Diazepam (Valium -)  5 mg PO TID RAJ


   Last Admin: 04/18/17 05:59 Dose:  5 mg


Docusate Sodium (Colace -)  100 mg PO TID RAJ


   Last Admin: 04/18/17 05:59 Dose:  100 mg


Vancomycin HCl (Vancomycin (Pre-Docked))  250 mls @ 250 mls/hr IVPB BID RAJ


   PRN Reason: Protocol


Potassium Chloride/Sodium Chloride (1/2ns+20meq Kcl)  1,000 mls @ 75 mls/hr IV 

ASDIR RAJ


   Last Admin: 04/17/17 21:04 Dose:  75 mls/hr


Montelukast Sodium (Singulair -)  10 mg PO HS RAJ


   Last Admin: 04/17/17 21:09 Dose:  10 mg


Oxycodone HCl (Roxicodone -)  5 mg PO Q6H PRN


   PRN Reason: PAIN


   Last Admin: 04/18/17 02:09 Dose:  5 mg


Topiramate (Topamax -)  50 mg PO DAILY [facility]











- Objective


Vital Signs: afebrile 


 





Neurological: Yes: Other (moving UE , appaers 5/5, 


limited ROM ogf th left hip s/p graft , reduced R Bi /BR reflex, 2+ TR BL, 

plantars downgoing)


Labs: 


 CBC, BMP





 04/17/17 17:05 





 04/17/17 17:05 





 INR, PTT











INR  1.01  (0.82-1.09)   04/15/17  12:49    











Problem List














Assessment/Plan


cervical diskitis C4-C5, recent bacteremia 


s/p decompression on 4/17 POD 3


neurologically stable and no deficits on neuro exam , afebrile, 


doing well 


no signs of myelopathy


as per ID 6-8 weeks of ABX via PICC 





stable for outpt FU from neuro standpoint 





Dr Olivera 


[phone]








Problem List





- Problems


(1) Osteomyelitis


Code(s): M86.9 - OSTEOMYELITIS, UNSPECIFIED   Qualifiers: 


     Osteomyelitis type: subacute     Osteomyelitis location: other site        

Qualified Code(s): M86.28 - Subacute osteomyelitis, other site  





(2) Cervical discitis


Code(s): M46.42 - DISCITIS, UNSPECIFIED, CERVICAL REGION

## 2017-04-20 NOTE — PN
Progress Note, Physician


History of Present Illness: 


patient doing well


neurosurgery note noted


patient feeling better





- Current Medication List


Current Medications: 


Active Medications





Acetaminophen (Tylenol -)  650 mg PO Q4H PRN


   PRN Reason: HEADACHE


   Last Admin: 04/19/17 09:20 Dose:  650 mg


Bacitracin/Polymyxin B Sulfate (Polysporin Ointment -)  1 applic TP DAILY [facility]


   Last Admin: 04/20/17 10:09 Dose:  1 applic


Ceftriaxone Sodium (Rocephin 1gm Ivpb (Pre-Docked))  1 gm IVPB DAILY [facility]


   Last Admin: 04/20/17 10:07 Dose:  1 gm


Diazepam (Valium -)  5 mg PO TID [facility]


   Last Admin: 04/20/17 06:15 Dose:  5 mg


Docusate Sodium (Colace -)  100 mg PO TID [facility]


   Last Admin: 04/20/17 06:15 Dose:  100 mg


Ferrous Sulfate (Feosol -)  325 mg PO TIDCM [facility]


IV Flush (Picc Line Flush)  8 ml IVPUSH PRN PRN


   PRN Reason: Protocol


Montelukast Sodium (Singulair -)  10 mg PO [facility]


   Last Admin: 04/19/17 21:23 Dose:  10 mg


Morphine Sulfate (Morphine Injection -)  1 mg IVPUSH Q4H PRN


   PRN Reason: PAIN


   Last Admin: 04/19/17 14:53 Dose:  1 mg


Oxycodone HCl (Roxicodone -)  5 mg PO Q6H PRN


   PRN Reason: PAIN


   Last Admin: 04/19/17 16:31 Dose:  5 mg


Oxycodone HCl (Roxicodone -)  10 mg PO Q4H PRN


   PRN Reason: PAIN


   Last Admin: 04/19/17 18:27 Dose:  10 mg


Polyethylene Glycol (Miralax (For Daily Use) -)  17 gm PO DAILY [facility]


   Last Admin: 04/20/17 10:09 Dose:  Not Given


Topiramate (Topamax -)  50 mg PO DAILY [facility]


   Last Admin: 04/20/17 10:06 Dose:  50 mg











- Objective


Vital Signs: 


 Vital Signs











Temperature  97.6 F   04/20/17 07:23


 


Pulse Rate  91 H  04/20/17 11:05


 


Respiratory Rate  20   04/20/17 07:23


 


Blood Pressure  125/70   04/20/17 07:23


 


O2 Sat by Pulse Oximetry (%)  98   04/20/17 11:05











Constitutional: Yes: No Distress, Calm


HENT: Yes: Other (hard collar)


Cardiovascular: Yes: Regular Rate and Rhythm


Respiratory: Yes: Regular, CTA Bilaterally


Gastrointestinal: Yes: Normal Bowel Sounds, Soft


Musculoskeletal: Yes: WNL


Extremities: Yes: WNL


Wound/Incision: Yes: Dressing Dry and Intact


Neurological: Yes: Alert, Oriented


Psychiatric: Yes: Alert, Oriented


Labs: 


 CBC, BMP





 04/20/17 07:00 





 04/20/17 07:00 





 INR, PTT











INR  1.01  (0.82-1.09)   04/15/17  12:49    














Assessment/Plan


osteo of cervical vertebra


bacteremia


pain





plan


await for surgery


proteus in tissue


sensitivities noted


she will need abx for 6-8 weeks


patient needs picc line

## 2017-04-20 NOTE — PN
Progress Note (short form)





- Note


Progress Note: 


NEUROSURGERY 





POD #3


[place] collar less irritating scalp and chin 


Did not feel adequate support with soft collar


Tolerating regular diet


Sore throat better


PE:  Tmax 98, AF, VSS


Some incisional pain


Scalp with no erosion 


Anterior neck/chin skin abrasion better


Motor B UE/ LE 4+-5 B; good UE ROM


Sensation intact LT


Dressing intact- no drainage


C spine x-rays: satisfactory position of C4-5 implants


On Ceftriaxone per ID


ID to manage iv abx long term 6-8 weeks


Need PIC line prior to discharge


Outpatient Rx given for [place] collar


Discharge instructions given

## 2017-04-20 NOTE — PN
Teaching Attending Note


Name of Resident: Miryam Olivas


ATTENDING PHYSICIAN STATEMENT





I saw and evaluated the patient.


I reviewed the resident's note and discussed the case with the resident.


I agree with the resident's findings and plan as documented.








SUBJECTIVE:continues to have neck discomfort from the collar, did not feel 

"sturdy" in the other collar provided. denies CP, SOb, fever, chills, N/V/C/D


ambulated well with PT








OBJECTIVE:





 Last Vital Signs











Temp Pulse Resp BP Pulse Ox


 


 97.6 F   91 H  20   125/70   98 


 


 04/20/17 07:23  04/20/17 11:05  04/20/17 07:23  04/20/17 07:23  04/20/17 11:05








General NAD, c-collar in place dressing c/d/i








ASSESSMENT AND PLAN:


60yo F with PMH GERD, nugraubes abd rectal carcinoid presented to the ER and 

was admitted for further evaluation of their emergent condition


1. C4-C5 discitis and osteomyelitis- s/p anterior cervical decompression and 

fusion on 4/17. Cx growing proteus with pan-sensitivity. on Ceftriaxone will 

need abx for 6-8 weeks. pain controlled. ID, neuro and neurosurgery on board. 


2. Pre-diabetic- A1c 6.6. will initiate lifestyle changes. will require repeat 

in 3 months. diabetic counseling


3. normocytic anemia- s/p 1 unit PRBC this admission. no signs of active 

bleeding. iron deficient. will start iron supplementation. counseled on risks 

of constipation and dark stools.   


4. dvt ppx- re-start hep sq


5. d/c planning tomorrow, SW notified and arranging for infusions and VNS

## 2017-04-20 NOTE — PN
Physical Exam: 


SUBJECTIVE: Patient seen and examined, feels better, not comfortable in soft 

collar, does not feel neck is secure. Pain improved with decadron given last 

night. 








OBJECTIVE:





 Vital Signs











 Period  Temp  Pulse  Resp  BP Sys/Bethea  Pulse Ox


 


 Last 24 Hr  97.3 F-98 F  73-94  20-20  125-140/70-78  97-98











GENERAL: The patient is awake, alert, and fully oriented, in no acute distress.


HEAD: Normal with no signs of trauma.


ENT: Ears normal, nares patent, oropharynx clear without exudates, moist mucous 


membranes.


NECK: brace in place, dressing intact; 


LUNGS: Breath sounds equal, clear to auscultation bilaterally, no wheezes, no 

crackles, no 


accessory muscle use. 


HEART: Regular rate and rhythm, S1, S2 without murmur, rub or gallop.


ABDOMEN: Soft, nontender, nondistended, normoactive bowel sounds, no guarding, 

no 


rebound, no hepatosplenomegaly, no masses.


EXTREMITIES: 2+ pulses, warm, well-perfused, no edema. left hip graft area, 

clean dry, no swelling, erythema, edema


NEUROLOGICAL: Cranial nerves II through XII grossly intact. Normal speech, gait 

not 


observed.


PSYCH: Normal mood, normal affect.


SKIN: Warm, dry, normal turgor, no rashes or lesions noted














 Laboratory Results - last 24 hr











  04/16/17 04/19/17 04/20/17





  10:07 06:20 07:00


 


WBC    6.2


 


RBC    3.20 L


 


Hgb    9.9 L


 


Hct    29.5 L


 


MCV    92.0


 


MCHC    33.6


 


RDW    15.8 H


 


Plt Count    171


 


MPV    7.2 L


 


Sodium   


 


Potassium   


 


Chloride   


 


Carbon Dioxide   


 


Anion Gap   


 


BUN   


 


Creatinine   


 


Random Glucose   


 


Calcium   


 


Iron   12 L 


 


TIBC   182 L 


 


Iron Saturation   7 L 


 


Crossmatch IS Only  See Detail  














  04/20/17





  07:00


 


WBC 


 


RBC 


 


Hgb 


 


Hct 


 


MCV 


 


MCHC 


 


RDW 


 


Plt Count 


 


MPV 


 


Sodium  140


 


Potassium  4.2


 


Chloride  108 H


 


Carbon Dioxide  24


 


Anion Gap  8


 


BUN  14  D


 


Creatinine  0.7


 


Random Glucose  116 H


 


Calcium  8.6


 


Iron 


 


TIBC 


 


Iron Saturation 


 


Crossmatch IS Only 








Active Medications











Generic Name Dose Route Start Last Admin





  Trade Name Freq  PRN Reason Stop Dose Admin


 


Acetaminophen  650 mg 04/18/17 12:35 04/19/17 09:20





  Tylenol -  PO   650 mg





  Q4H PRN   Administration





  HEADACHE   


 


Bacitracin/Polymyxin B Sulfate  1 applic 04/18/17 10:00 04/20/17 10:09





  Polysporin Ointment -  TP   1 applic





  DAILY RAJ   Administration


 


Ceftriaxone Sodium  1 gm 04/18/17 10:00 04/20/17 10:07





  Rocephin 1gm Ivpb (Pre-Docked)  IVPB   1 gm





  DAILY RAJ   Administration


 


Diazepam  5 mg 04/17/17 22:00 04/20/17 06:15





  Valium -  PO   5 mg





  TID RAJ   Administration


 


Docusate Sodium  100 mg 04/17/17 22:00 04/20/17 06:15





  Colace -  PO   100 mg





  TID RAJ   Administration


 


Ferrous Sulfate  325 mg 04/20/17 12:30  





  Feosol -  PO   





  TIDCM RAJ   


 


IV Flush  8 ml 04/20/17 09:02  





  Picc Line Flush  IVPUSH   





  PRN PRN   





  Protocol   


 


Montelukast Sodium  10 mg 04/17/17 22:00 04/19/17 21:23





  Singulair -  PO   10 mg





  HS RAJ   Administration


 


Morphine Sulfate  1 mg 04/19/17 11:46 04/19/17 14:53





  Morphine Injection -  IVPUSH   1 mg





  Q4H PRN   Administration





  PAIN   


 


Oxycodone HCl  5 mg 04/17/17 16:38 04/19/17 16:31





  Roxicodone -  PO   5 mg





  Q6H PRN   Administration





  PAIN   


 


Oxycodone HCl  10 mg 04/19/17 18:21 04/19/17 18:27





  Roxicodone -  PO   10 mg





  Q4H PRN   Administration





  PAIN   


 


Polyethylene Glycol  17 gm 04/20/17 10:00 04/20/17 10:09





  Miralax (For Daily Use) -  PO   Not Given





  DAILY RAJ   


 


Topiramate  50 mg 04/18/17 10:00 04/20/17 10:06





  Topamax -  PO   50 mg





  DAILY RAJ   Administration











ASSESSMENT/PLAN:


IMAGING: 


 MRI of the neck showed: C4-5 discitis/osteomyelitis; anterior epidural soft 

tissue thickening; posterior cervical ligamentus edema C4-5; prevertebral soft 

tissue 


  swelling; + enhancement.





ASSESSMENT/PLAN:


Patient is a 62 year old female with significant past medical history of 

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 

presented to the ED with the chief complaints of neck and bilateral shoulder 

pain. Recently discharged from the hospital for sepsis secondary to UTI (

proteus mirabilis bateremia) on 4/6/17.





#Osteomyelitis of C4/C5 sp cervical decompression surgery POD3


-cont  IV Ceftriaxone 1gm daily for 6-8 weeks


-picc line upon DC


-culture growing proteus; pan sensitive; 


-follow neurology recommendations





# GERD- no active symptoms


   


# Migraine-no active symptoms


 


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





# FEN


   IV NS @ 75mls/hr


   Electrolytes to be repeated


   NPO





DVT prophylaxis: ambulate;





Disposition: dc  home in am; s/p picc line





Visit type





- Emergency Visit


Emergency Visit: Yes


ED Registration Date: 04/15/17


Care time: The patient presented to the Emergency Department on the above date 

and was hospitalized for further evaluation of their emergent condition.





- New Patient


This patient is new to me today: No





- Critical Care


Critical Care patient: No

## 2017-04-21 VITALS — DIASTOLIC BLOOD PRESSURE: 70 MMHG | TEMPERATURE: 98.1 F | HEART RATE: 86 BPM | SYSTOLIC BLOOD PRESSURE: 131 MMHG

## 2017-04-21 LAB
DEPRECATED RDW RBC AUTO: 15.9 % (ref 11.6–15.6)
MCH RBC QN AUTO: 30.9 PG (ref 25.7–33.7)
MCHC RBC AUTO-ENTMCNC: 33.5 G/DL (ref 32–36)
MCV RBC: 92.4 FL (ref 80–96)
PLATELET # BLD AUTO: 183 K/MM3 (ref 134–434)
PMV BLD: 7.2 FL (ref 7.5–11.1)
WBC # BLD AUTO: 4.7 K/MM3 (ref 4–10)

## 2017-04-21 PROCEDURE — 02HV33Z INSERTION OF INFUSION DEVICE INTO SUPERIOR VENA CAVA, PERCUTANEOUS APPROACH: ICD-10-PCS | Performed by: RADIOLOGY

## 2017-04-21 PROCEDURE — B548ZZA ULTRASONOGRAPHY OF SUPERIOR VENA CAVA, GUIDANCE: ICD-10-PCS | Performed by: RADIOLOGY

## 2017-04-21 RX ADMIN — FERROUS SULFATE TAB EC 324 MG (65 MG FE EQUIVALENT) SCH MG: 324 (65 FE) TABLET DELAYED RESPONSE at 15:46

## 2017-04-21 RX ADMIN — CEFTRIAXONE SCH GM: 1 INJECTION, POWDER, FOR SOLUTION INTRAMUSCULAR; INTRAVENOUS at 09:31

## 2017-04-21 RX ADMIN — FERROUS SULFATE TAB EC 324 MG (65 MG FE EQUIVALENT) SCH MG: 324 (65 FE) TABLET DELAYED RESPONSE at 09:31

## 2017-04-21 RX ADMIN — DOCUSATE SODIUM SCH MG: 100 CAPSULE, LIQUID FILLED ORAL at 06:40

## 2017-04-21 RX ADMIN — Medication SCH APPLIC: at 10:01

## 2017-04-21 RX ADMIN — DOCUSATE SODIUM SCH MG: 100 CAPSULE, LIQUID FILLED ORAL at 15:46

## 2017-04-21 RX ADMIN — TOPIRAMATE SCH MG: 25 TABLET, FILM COATED ORAL at 09:40

## 2017-04-21 RX ADMIN — FERROUS SULFATE TAB EC 324 MG (65 MG FE EQUIVALENT) SCH: 324 (65 FE) TABLET DELAYED RESPONSE at 17:40

## 2017-04-21 RX ADMIN — POLYETHYLENE GLYCOL 3350 SCH: 17 POWDER, FOR SOLUTION ORAL at 09:36

## 2017-04-21 NOTE — PN
Teaching Attending Note


Name of Resident: Miryam Olivas


ATTENDING PHYSICIAN STATEMENT





I saw and evaluated the patient.


I reviewed the resident's note and discussed the case with the resident.


I agree with the resident's findings and plan as documented.








SUBJECTIVE:c/o R shoulder pain. worse on movement. limited to anterior deltoid 

region. no numbness/tingling denies CP, SOB,fever, chills








OBJECTIVE:


 Last Vital Signs











Temp Pulse Resp BP Pulse Ox


 


 98.1 F   86   18   131/70   98 


 


 04/21/17 10:00  04/21/17 10:00  04/21/17 10:00  04/21/17 10:00  04/21/17 10:00








General NAD, c-collar in place dressing c/d/i


back- no bone point tenderness along the cervical spine. some muscle spasms 

inferior border of scapula. full ROM of RUE active and passive movements. 





ASSESSMENT AND PLAN:


62yo F with PMH GERD, nugraubes abd rectal carcinoid presented to the ER and 

was admitted for further evaluation of their emergent condition


1. C4-C5 discitis and osteomyelitis- s/p anterior cervical decompression and 

fusion on 4/17. Cx growing proteus with pan-sensitivity. on Ceftriaxone. PICC 

line today for additional 6-8 weeks. pain controlled. ID, neuro and 

neurosurgery on board. 


2. Muscle spasm- XR done of cervical spine. no subluxation from surgical site. 

pain most likely muscular and from C-collar pressure


3. Pre-diabetic- A1c 6.6. will initiate lifestyle changes. will require repeat 

in 3 months. diabetic counseling


4. normocytic anemia- s/p 1 unit PRBC this admission. no signs of active 

bleeding. iron deficient. on iron supplementation. counseled on risks of 

constipation and dark stools.   


5. dvt ppx-  hep sq


6. d/c home. will need weekly ESR/CRP checks.

## 2017-04-21 NOTE — DS
[id number] pain. Adjusting to the neck brace. 








OBJECTIVE:





 Vital Signs











 Period  Temp  Pulse  Resp  BP Sys/Bethea  Pulse Ox


 


 Last 24 Hr  97.9 F-98.8 F  74-90  18-20  112-135/70-76  98-98








PHYSICAL EXAM





GENERAL: The patient is awake, alert, and fully oriented, in no acute distress.


HEAD: Normal with no signs of trauma.


NECK: NEck brace secure; dressings clean dr intactTrachea midline, full range 

of motion, supple. 


LUNGS: Breath sounds equal, clear to auscultation bilaterally, no wheezes, no 

crackles, no accessory muscle use. 


HEART: Regular rate and rhythm, S1, S2 without murmur, rub or gallop.


ABDOMEN: Soft, nontender, nondistended, normoactive bowel sounds, no guarding, 

no rebound, no hepatosplenomegaly, no masses.


EXTREMITIES: 2+ pulses, warm, well-perfused, no edema. Left hip regiod, 

incision clean, dry, no surrounding erythema or edema


NEUROLOGICAL: Cranial nerves II through XII grossly intact. Normal speech, gait 

not observed.


PSYCH: Normal mood, normal affect.


SKIN: Warm, dry, normal turgor, no rashes or lesions noted.





LABS


 Laboratory Results - last 24 hr











  04/21/17





  06:15


 


WBC  4.7


 


RBC  3.34 L


 


Hgb  10.3 L


 


Hct  30.9 L


 


MCV  92.4


 


MCHC  33.5


 


RDW  15.9 H


 


Plt Count  183


 


MPV  7.2 L





IMAGING: 


 MRI of the neck showed: C4-5 discitis/osteomyelitis; anterior epidural soft 

tissue thickening; posterior cervical ligamentus edema C4-5; prevertebral soft 

tissue 


 swelling; + enhancement.





HOSPITAL COURSE:





Date of Admission:04/15/17





Date of Discharge: 04/21/17





Patient is a 62 year old female with significant past medical history of 

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 

presented to the ED with the chief complaints of neck and bilateral shoulder 

pain. Recently discharged from the hospital for sepsis secondary to UTI (

proteus mirabilis bateremia) on 4/6/17.





#Osteomyelitis of C4/C5 sp cervical decompression surgery POD4


-IV Ceftriaxone 1gm daily for 6 weeks


-picc line


-culture proteus; pan sensitive; 


-follow neurology recommendations





# GERD- no active symptoms


   


# Migraine-no active symptoms


 


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





Minutes to complete discharge: 35





<Miryam Olivas - Last Filed: 04/21/17 15:36>


Physical Exam: 


iron deficiency anemia- started on iron supplements


Pre-diabetic- educated on lifestyle changes. evaluated by dietician and 

educated on diabetic diet. informed will need repeat A1c in 3 months to 

evaluate if medications need to be initiated. 











<Juanita Cosme - Last Filed: 04/26/17 15:25>





Discharge Summary


Reason For Visit: OSTEOMYELITIS


Current Active Problems





Cervical discitis (Acute) 


Diskitis (Acute) 


Osteomyelitis (Acute) 











- Home Medications


Comprehensive Discharge Medication List: 


Ambulatory Orders





Montelukast Na [Singulair -] 10 mg PO HS 03/27/17 


Topiramate [Topamax] 50 mg PO DAILY 03/27/17 


Lorazepam [Ativan] 2 mg PO HS 04/15/17 


Ceftriaxone 1 gm/D5w [Rocephin 1 gm Premix Ivpb -] 1 gm IV DAILY #44 bag 04/20/ 17 


Ceftriaxone [Rocephin 1Gm Ivpb (Pre-Docked)] 1 gm IVPB DAILY  bag 04/21/17 


Ferrous Sulfate [Feosol] 325 mg PO BID #60 tablet 04/21/17 


Oxycodone HCl [Roxicodone -] 5 mg PO Q6H PRN #20 tablet MDD 4 04/21/17 











<Miryam Olivas - Last Filed: 04/21/17 15:36>





- Home Medications


Comprehensive Discharge Medication List: 


Ambulatory Orders





Montelukast Na [Singulair -] 10 mg PO HS 03/27/17 


Topiramate [Topamax] 50 mg PO DAILY 03/27/17 


Lorazepam [Ativan] 2 mg PO HS 04/15/17 


Ceftriaxone 1 gm/D5w [Rocephin 1 gm Premix Ivpb -] 1 gm IV DAILY #44 bag 04/20/ 17 


Ceftriaxone [Rocephin 1Gm Ivpb (Pre-Docked)] 1 gm IVPB DAILY  bag 04/21/17 


Ferrous Sulfate [Feosol] 325 mg PO BID #60 tablet 04/21/17 


Oxycodone HCl [Roxicodone -] 5 mg PO Q6H PRN #20 tablet MDD 4 04/21/17 











<Juanita Cosme - Last Filed: 04/26/17 15:25>


Condition: Stable





- Instructions


Diet, Activity, Other Instructions: 


Post-op Instructions





Diet: Diabetic diet. follow hand out and instruction provided by dietician.





Activity:May shower but keep incision line dry. Change dressing afterwards. 





Restrictions: Do not lift anything over 10lbs. Wear collar at all time except 

in shower.





Additional Instructions: Keep incision line clean and dry. Change dressing 

daily. Report any chills,


                                fever (100 or greater), any wound drainage, or 

any neurological changes to Dr. Trotter. 


                                Do not drive for two weeks.





Initial post-op appt: Call Dr Trotter's office to be seen in about 10 days [phone]





Follow up with your primary care doctor in 1 week. You will need to have ESR/

CRP levels checked weekly. You will need to have your diabetic number (A1c) 

checked in 3 months. If it does not improve you may need to start medications. 

You have also been started on iron supplements, this can make you constipated 

and turn your stools black. ensure you are having daily bowel movements.





Do not drive or operate heavy machinery when using percocet. This medication 

can cause you to be drowsy and can cause constipation.





If you develop numbness/tingling in your extremities or your symptoms worsen 

return to the ER.


PICC line  to Right arm,instructions given to pt.


Referrals: 


Phan Trotter MD [Staff Physician] - 


Jorge Taylor MD [Primary Care Provider] - 


Disposition: HOME


This patient is new to me today: No


Emergency Visit: Yes


ED Registration Date: 04/15/17


Care time: The patient presented to the Emergency Department on the above date 

and was hospitalized for further evaluation of their emergent condition.


Critical Care patient: No





- Discharge Referral


Referred to [facility] No





<Miryam Olivas - Last Filed: 04/21/17 15:36>

## 2017-04-21 NOTE — PN
Progress Note, Physician


History of Present Illness: 


stable


still with [place] collar


no issues





- Current Medication List


Current Medications: 


Active Medications





Acetaminophen (Tylenol -)  650 mg PO Q4H PRN


   PRN Reason: HEADACHE


   Last Admin: 04/20/17 14:16 Dose:  650 mg


Bacitracin/Polymyxin B Sulfate (Polysporin Ointment -)  1 applic TP DAILY [facility]


   Last Admin: 04/20/17 10:09 Dose:  1 applic


Ceftriaxone Sodium (Rocephin 1gm Ivpb (Pre-Docked))  1 gm IVPB DAILY [facility]


   Last Admin: 04/21/17 09:31 Dose:  1 gm


Diazepam (Valium -)  5 mg PO TID [facility]


   Last Admin: 04/21/17 06:40 Dose:  5 mg


Docusate Sodium (Colace -)  100 mg PO TID [facility]


   Last Admin: 04/21/17 06:40 Dose:  100 mg


Ferrous Sulfate (Feosol -)  325 mg PO TIDCM [facility]


   Last Admin: 04/21/17 09:31 Dose:  325 mg


IV Flush (Picc Line Flush)  8 ml IVPUSH PRN PRN


   PRN Reason: Protocol


Montelukast Sodium (Singulair -)  10 mg PO HS [facility]


   Last Admin: 04/20/17 21:28 Dose:  10 mg


Morphine Sulfate (Morphine Injection -)  1 mg IVPUSH Q4H PRN


   PRN Reason: PAIN


   Last Admin: 04/19/17 14:53 Dose:  1 mg


Oxycodone HCl (Roxicodone -)  5 mg PO Q6H PRN


   PRN Reason: PAIN


   Last Admin: 04/20/17 15:14 Dose:  5 mg


Oxycodone HCl (Roxicodone -)  10 mg PO Q4H PRN


   PRN Reason: PAIN


   Last Admin: 04/20/17 21:28 Dose:  10 mg


Polyethylene Glycol (Miralax (For Daily Use) -)  17 gm PO DAILY [facility]


   Last Admin: 04/21/17 09:36 Dose:  Not Given


Topiramate (Topamax -)  50 mg PO DAILY [facility]


   Last Admin: 04/21/17 09:40 Dose:  50 mg











- Objective


Vital Signs: 


 Vital Signs











Temperature  97.9 F   04/21/17 06:02


 


Pulse Rate  75   04/21/17 06:02


 


Respiratory Rate  20   04/21/17 06:02


 


Blood Pressure  112/76   04/20/17 23:00


 


O2 Sat by Pulse Oximetry (%)  98   04/20/17 21:00











Constitutional: Yes: No Distress, Calm


HENT: Yes: Other (hard collar)


Neck: Yes: Supple


Cardiovascular: Yes: Regular Rate and Rhythm


Respiratory: Yes: Regular, CTA Bilaterally


Gastrointestinal: Yes: Normal Bowel Sounds, Soft


Musculoskeletal: Yes: WNL


Extremities: Yes: WNL


Neurological: Yes: Alert, Oriented


Psychiatric: Yes: Alert, Oriented


Labs: 


 CBC, BMP





 04/21/17 06:15 





 04/20/17 07:00 





 INR, PTT











INR  1.01  (0.82-1.09)   04/15/17  12:49    














Assessment/Plan


osteo of cervical vertebra


bacteremia


pain





plan


await for surgery


proteus in tissue


sensitivities noted


await for picc line


6 weeks of abx


patient to get crp weekly with esr
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
## 2017-10-22 ENCOUNTER — HOSPITAL ENCOUNTER (EMERGENCY)
Dept: HOSPITAL 74 - FER | Age: 62
Discharge: HOME | End: 2017-10-22
Payer: MEDICARE

## 2017-10-22 VITALS — SYSTOLIC BLOOD PRESSURE: 122 MMHG | HEART RATE: 82 BPM | TEMPERATURE: 97.9 F | DIASTOLIC BLOOD PRESSURE: 73 MMHG

## 2017-10-22 VITALS — BODY MASS INDEX: 24.9 KG/M2

## 2017-10-22 DIAGNOSIS — S16.1XXA: Primary | ICD-10-CM

## 2017-10-22 DIAGNOSIS — S29.019A: ICD-10-CM
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
## 2017-10-22 DIAGNOSIS — K21.9: ICD-10-CM
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
## 2017-10-22 DIAGNOSIS — Z85.048: ICD-10-CM
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
## 2017-10-22 LAB
ALBUMIN SERPL-MCNC: 3.9 G/DL (ref 3.5–5)
ALP SERPL-CCNC: 217 U/L (ref 32–92)
ALT SERPL-CCNC: 65 U/L (ref 10–40)
ANION GAP SERPL CALC-SCNC: 7 MMOL/L (ref 8–16)
AST SERPL-CCNC: 36 U/L (ref 10–42)
BASOPHILS # BLD: 1.2 % (ref 0–2)
BILIRUB SERPL-MCNC: 0.5 MG/DL (ref 0.2–1)
CALCIUM SERPL-MCNC: 9.2 MG/DL (ref 8.4–10.2)
CO2 SERPL-SCNC: 23 MMOL/L (ref 22–28)
CREAT SERPL-MCNC: 0.9 MG/DL (ref 0.6–1.3)
CRP SERPL-MCNC: < 0.3 MG/DL (ref 0–0.3)
DEPRECATED RDW RBC AUTO: 16.8 % (ref 11.6–15.6)
EOSINOPHIL # BLD: 1.3 % (ref 0–4.5)
ERYTHROCYTE [SEDIMENTATION RATE] IN BLOOD BY WESTERGREN METHOD: 7 MM/HR (ref 0–30)
GLUCOSE SERPL-MCNC: 142 MG/DL (ref 74–106)
INR BLD: 1.02 (ref 0.82–1.09)
MCH RBC QN AUTO: 29.5 PG (ref 25.7–33.7)
MCHC RBC AUTO-ENTMCNC: 33 G/DL (ref 32–36)
MCV RBC: 89.6 FL (ref 80–96)
NEUTROPHILS # BLD: 85.3 % (ref 42.8–82.8)
PLATELET # BLD AUTO: 348 K/MM3 (ref 134–434)
PMV BLD: 7.7 FL (ref 7.5–11.1)
PROT SERPL-MCNC: 7.1 G/DL (ref 6.4–8.3)
PT PNL PPP: 11.4 SEC (ref 10.2–13)
WBC # BLD AUTO: 7 K/MM3 (ref 4–10.8)
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
## 2019-01-15 PROBLEM — Z00.00 ENCOUNTER FOR PREVENTIVE HEALTH EXAMINATION: Status: ACTIVE | Noted: 2019-01-15

## 2019-01-16 ENCOUNTER — APPOINTMENT (OUTPATIENT)
Dept: GASTROENTEROLOGY | Facility: CLINIC | Age: 64
End: 2019-01-16

## 2019-02-22 ENCOUNTER — HOSPITAL ENCOUNTER (INPATIENT)
Dept: HOSPITAL 74 - FER | Age: 64
LOS: 3 days | Discharge: ANOTHER HOSPITAL | DRG: 445 | End: 2019-02-25
Attending: NURSE PRACTITIONER | Admitting: INTERNAL MEDICINE
Payer: COMMERCIAL

## 2019-02-22 VITALS — BODY MASS INDEX: 24.9 KG/M2

## 2019-02-22 DIAGNOSIS — R10.10: ICD-10-CM

## 2019-02-22 DIAGNOSIS — E04.1: ICD-10-CM

## 2019-02-22 DIAGNOSIS — D72.829: ICD-10-CM

## 2019-02-22 DIAGNOSIS — Z85.040: ICD-10-CM

## 2019-02-22 DIAGNOSIS — K52.9: ICD-10-CM

## 2019-02-22 DIAGNOSIS — K21.9: ICD-10-CM

## 2019-02-22 DIAGNOSIS — E87.2: ICD-10-CM

## 2019-02-22 DIAGNOSIS — R73.03: ICD-10-CM

## 2019-02-22 DIAGNOSIS — K44.9: ICD-10-CM

## 2019-02-22 DIAGNOSIS — K81.0: ICD-10-CM

## 2019-02-22 DIAGNOSIS — M46.22: ICD-10-CM

## 2019-02-22 DIAGNOSIS — R65.10: ICD-10-CM

## 2019-02-22 DIAGNOSIS — K83.8: ICD-10-CM

## 2019-02-22 DIAGNOSIS — K31.0: ICD-10-CM

## 2019-02-22 DIAGNOSIS — G43.909: ICD-10-CM

## 2019-02-22 DIAGNOSIS — C7A.026: ICD-10-CM

## 2019-02-22 DIAGNOSIS — E34.0: ICD-10-CM

## 2019-02-22 DIAGNOSIS — R74.8: ICD-10-CM

## 2019-02-22 DIAGNOSIS — K80.62: Primary | ICD-10-CM

## 2019-02-22 DIAGNOSIS — K59.00: ICD-10-CM

## 2019-02-22 LAB
ALBUMIN SERPL-MCNC: 4 G/DL (ref 3.4–5)
ALP SERPL-CCNC: 100 U/L (ref 45–117)
ALT SERPL-CCNC: 57 U/L (ref 13–61)
ANION GAP SERPL CALC-SCNC: 10 MMOL/L (ref 8–16)
AST SERPL-CCNC: 36 U/L (ref 15–37)
BILIRUB SERPL-MCNC: 0.8 MG/DL (ref 0.2–1)
BUN SERPL-MCNC: 22 MG/DL (ref 7–18)
CALCIUM SERPL-MCNC: 9 MG/DL (ref 8.5–10)
CHLORIDE SERPL-SCNC: 106 MMOL/L (ref 98–107)
CO2 SERPL-SCNC: 22 MMOL/L (ref 21–32)
CREAT SERPL-MCNC: 0.9 MG/DL (ref 0.55–1.3)
DEPRECATED RDW RBC AUTO: 16.4 % (ref 11.6–15.6)
GLUCOSE SERPL-MCNC: 246 MG/DL (ref 74–106)
HCT VFR BLD CALC: 41.2 % (ref 32.4–45.2)
HGB BLD-MCNC: 13.3 GM/DL (ref 10.7–15.3)
MCH RBC QN AUTO: 30.2 PG (ref 25.7–33.7)
MCHC RBC AUTO-ENTMCNC: 32.3 G/DL (ref 32–36)
MCV RBC: 93.5 FL (ref 80–96)
PH BLDV: 7.36 [PH] (ref 7.32–7.42)
PLATELET # BLD AUTO: 748 K/MM3 (ref 134–434)
PLATELET BLD QL SMEAR: (no result)
PMV BLD: 8.2 FL (ref 7.5–11.1)
POTASSIUM SERPLBLD-SCNC: 4 MMOL/L (ref 3.5–5.1)
PROT SERPL-MCNC: 7 G/DL (ref 6.4–8.2)
RBC # BLD AUTO: 4.4 M/MM3 (ref 3.6–5.2)
SODIUM SERPL-SCNC: 138 MMOL/L (ref 136–145)
VENOUS PC02: 41.6 MMHG (ref 38–52)
VENOUS PO2: 43.7 MMHG (ref 28–48)
WBC # BLD AUTO: 28.9 K/MM3 (ref 4–10.8)

## 2019-02-22 PROCEDURE — G0378 HOSPITAL OBSERVATION PER HR: HCPCS

## 2019-02-22 NOTE — PDOC
History of Present Illness





- General


History Source: Patient


Exam Limitations: No Limitations





- History of Present Illness


Initial Comments: 








02/22/19 23:19


The patient is a 63 year old female, with a significant past medical history of

  rectal CA, GERD and migraine headaches, who presents to the emergency 

department with chest, epigastric and left sided  flank pain, radiating to her 

back since 6:30pm. The patient notes her symptoms are  associated with nausea, 

one episode of vomiting  and SOB. The patient describes the pain as a burning  

sensation. The patient notes she took pepto bismol with no relief. The patient 

states she had some episodes of diarrhea for the past 4 days (on medication) 

and has since gotten better. The patient notes she has had a cough and runny 

nose for the past 2 weeks.  The patient states she has never experienced pain 

like this before. 


 


The patient denies fever, chills, or constipation.


The patient denies dysuria, frequency, urgency or hematuria.








FAMILY HISTORY:  no pertinent history





SOCIAL HISTORY:  Pt lives with  family and is employed.





MEDICATIONS:  reviewed





ALLERGIES:  As per nursing notes











<Christin Pollcak - Last Filed: 02/22/19 23:19>





- General


History Source: Patient


Exam Limitations: No Limitations





- History of Present Illness


Initial Comments: 





02/22/19 23:16


 A portion of this note was documented by scribe services under my direction. I 

have reviewed the details of the note, within reason, and agree with the 

documentation with the following case summary and management plan written by 

me. 





Patient treated in the ED.





Nursing notes are reviewed and incorporated into the medical decision-making.


Vital signs reviewed.





Assessment and plan: This is a 63-year-old female who comes in complaining of 

abdominal pain radiating to her back. Patient also had some nausea and vomited 

times once, also complaining of shortness of breath and dizziness. Patient has 

history significant for carcinoid.





Workup initiated including EKG, CBC, comp, lipase, d-dimer, blood cultures and 

urinalysis and urine cultures.


02/23/19 00:07


Reevaluation patient feels better still with some pain. Pending





EKG shows normal sinus rhythm at a rate of 70 no acute ST-T wave changes.











<Tony Quispe - Last Filed: 02/23/19 01:14>





- General


Chief Complaint: Chest Pain


Stated Complaint: CHEST & FLANK PAIN


Time Seen by Provider: 02/22/19 22:03





Past History





<Christin Pollack - Last Filed: 02/22/19 23:19>





- Past Medical History


Cancer: Yes (RECTAL)


COPD: No


GI Disorders: Yes (GERD)


Other medical history: SEASONAL ALLERGIES SEASONAL ALLERGIES.





- Surgical History


Gastric Stapling: Yes





- Suicide/Smoking/Psychosocial Hx


Smoking History: Unknown if ever smoked


Have you smoked in the past 12 months: Yes


Hx Alcohol Use: No


Drug/Substance Use Hx: No


Substance Use Type: None





<Tony Quispe - Last Filed: 02/23/19 01:14>





- Past Medical History


Allergies/Adverse Reactions: 


 Allergies











Allergy/AdvReac Type Severity Reaction Status Date / Time


 


everolimus [From Afinitor] AdvReac Severe SOB, Verified 10/22/17 12:39





   Myalgias,  





   Arthralgias,Edema  


 


erythromycin base AdvReac   Verified 10/22/17 12:39





[Erythromycin Base]     











Home Medications: 


Ambulatory Orders





Montelukast Na [Singulair -] 10 mg PO HS 03/27/17 


Topiramate [Topamax] 50 mg PO DAILY 03/27/17 


Lorazepam [Ativan] 2 mg PO HS 04/15/17 


Octreotide Acetate [Sandostatin] 30 mcg IJ MONTHLY  ampul 08/31/17 


Marj Griffin 3,000 Units Capsule  02/22/19 


Telotristat Etiprate [Xermelo] 250 mg PO DAILY 02/22/19 











**Review of Systems





- Review of Systems


Able to Perform ROS?: Yes


Comments:: 





02/22/19 23:19


General:  No fevers or chills, no weakness, no weight loss 


HEENT: (+) cough.  (+) runny nose. No change in vision.  No sore throat,. No 

ear pain


CardioVascular: (+)shortness of breath


Respiratory:No cough, or wheezing. 


Gastrointestinal:  (+)nausea, (+) diarrhea. (+) vomiting, no constipation,  No 

rectal bleeding


Genitourinary:  No dysuria, hematuria, or frequency


Musculoskeletal:(+) chest pain  (+) epigastric pain. (+) left sided flank pain. 

No joint swelling


Neurologic: No headache, vertigo, dizziness or loss of consciousness


Psychiatric: nor depression 


Skin: No rashes or easy bruising


Endocrine: no increased thirst or abnormal weight change


Allergic: no skin or latex allergy


All other systems reviewed and normal














<Christin Pollack - Last Filed: 02/22/19 23:19>





*Physical Exam





- Vital Signs


 Last Vital Signs











Temp Pulse Resp BP Pulse Ox


 


 99.1 F   65   16   129/66   100 


 


 02/22/19 22:11  02/22/19 22:11  02/22/19 22:11  02/22/19 22:11  02/22/19 22:11














- Physical Exam


Comments: 





02/22/19 23:20


General: Well-nourished well-developed individual, no acute distress


HEENT: Throat: Normal, tonsils normal, no erythema or exudate


Neck: Supple, no meningeal signs, no lymphadenopathy


Eyes::Pupils equal reactive and round, extraocular motion intact


Chest: Nontender to palpation 


Cardiac: S1-S2 normal, regular rate and rhythm, no murmurs rubs or gallops


Respiratory: Lungs clear to auscultation bilateral


Abdomen: (+) tenderness to palpation on the epigastric region and mild 

tenderness on the rest of the abdomen. Soft, nondistended, normal bowel sounds.


Extremities: (+) 1+ edema of feet and ankles. Warm, dry, no cyanosis, no 

clubbing. 


Skin: No rashes


Neuro: Alert and oriented x3, nonfocal exam, grossly intact, normal gait


Psych: Normal mood and affect








<Christin Pollack - Last Filed: 02/22/19 23:19>





- Vital Signs


 Last Vital Signs











Temp Pulse Resp BP Pulse Ox


 


 99.1 F   65   16   129/66   100 


 


 02/22/19 22:11  02/22/19 22:11  02/22/19 22:11  02/22/19 22:11  02/22/19 22:11














<Tony Quispe - Last Filed: 02/23/19 01:14>





Moderate Sedation





- Procedure Monitoring


Vital Signs: 


Procedure Monitoring Vital Signs











Temperature  99.1 F   02/22/19 22:11


 


Pulse Rate  65   02/22/19 22:11


 


Respiratory Rate  16   02/22/19 22:11


 


Blood Pressure  129/66   02/22/19 22:11


 


O2 Sat by Pulse Oximetry (%)  100   02/22/19 22:11











<Christin Pollack - Last Filed: 02/22/19 23:19>





- Procedure Monitoring


Vital Signs: 


Procedure Monitoring Vital Signs











Temperature  99.1 F   02/22/19 22:11


 


Pulse Rate  65   02/22/19 22:11


 


Respiratory Rate  16   02/22/19 22:11


 


Blood Pressure  129/66   02/22/19 22:11


 


O2 Sat by Pulse Oximetry (%)  100   02/22/19 22:11











<Tony Quispe I - Last Filed: 02/23/19 01:14>





ED Treatment Course





- LABORATORY


CBC & Chemistry Diagram: 


 02/22/19 22:35





 02/22/19 22:30





- ADDITIONAL ORDERS


Additional order review: 


 Laboratory  Results











  02/22/19 02/22/19 02/22/19





  22:30 22:30 22:23


 


Sodium   138 


 


Potassium   4.0 


 


Chloride   106 


 


Carbon Dioxide   22 


 


Anion Gap   10 


 


BUN   22 H 


 


Creatinine   0.9 


 


Creat Clearance w eGFR   > 60 


 


Random Glucose   246 H 


 


Calcium   9.0 


 


Total Bilirubin   0.8 


 


AST   36 


 


ALT   57 


 


Alkaline Phosphatase   100 


 


Creatine Kinase  83  


 


Troponin I    < 0.03


 


Total Protein   7.0 


 


Albumin   4.0 








 











  02/22/19





  22:35


 


RBC  4.40


 


MCV  93.5


 


MCHC  32.3


 


RDW  16.4 H


 


MPV  8.2


 


Neutrophils %  No Result Required.


 


Lymphocytes %  No Result Required.














- Medications


Given in the ED: 


ED Medications














Discontinued Medications














Generic Name Dose Route Start Last Admin





  Trade Name Joseq  PRN Reason Stop Dose Admin


 


Ketorolac Tromethamine  30 mg  02/22/19 22:42  02/22/19 23:03





  Toradol Injection -  IVPUSH  02/22/19 22:43  30 mg





  ONCE ONE   Administration





     





     





     





     


 


Morphine Sulfate  4 mg  02/22/19 22:42  02/22/19 23:03





  Morphine Injection -  IVPUSH  02/22/19 22:43  4 mg





  ONCE ONE   Administration





     





     





     





     














<Christin Pollack - Last Filed: 02/22/19 23:19>





- LABORATORY


CBC & Chemistry Diagram: 


 02/22/19 22:35





 02/22/19 22:30





<Tony Quispe - Last Filed: 02/23/19 01:14>





*DC/Admit/Observation/Transfer





- Attestations


Scribe Attestion: 





02/22/19 23:20





Documentation prepared by Christin Pollack, acting as medical scribe for 

Tony Quispe MD





<Christin Pollack - Last Filed: 02/22/19 23:19>





- Discharge Dispostion


Decision to Admit order: Yes





<Tony Quispe - Last Filed: 02/23/19 01:14>


Diagnosis at time of Disposition: 


Abdominal pain


Qualifiers:


 Abdominal location: upper abdomen, unspecified Qualified Code(s): R10.10 - 

Upper abdominal pain, unspecified





Leukocytosis


Qualifiers:


 Leukocytosis type: other Qualified Code(s): D72.828 - Other elevated white 

blood cell count








- Discharge Dispostion


Condition at time of disposition: Good





- Referrals


Referrals: 


Kingston Kruger MD [Primary Care Provider] - 





- Patient Instructions





- Post Discharge Activity

## 2019-02-23 LAB
ALBUMIN SERPL-MCNC: 3.1 G/DL (ref 3.4–5)
ALBUMIN SERPL-MCNC: 3.3 G/DL (ref 3.4–5)
ALP SERPL-CCNC: 100 U/L (ref 45–117)
ALP SERPL-CCNC: 102 U/L (ref 45–117)
ALT SERPL-CCNC: 104 U/L (ref 13–61)
ALT SERPL-CCNC: 60 U/L (ref 13–61)
ANION GAP SERPL CALC-SCNC: 6 MMOL/L (ref 8–16)
ANION GAP SERPL CALC-SCNC: 8 MMOL/L (ref 8–16)
ANISOCYTOSIS BLD QL: 0
APPEARANCE UR: CLEAR
AST SERPL-CCNC: 129 U/L (ref 15–37)
AST SERPL-CCNC: 23 U/L (ref 15–37)
BASO STIPL BLD QL SMEAR: 0
BASOPHILS # BLD: 0.4 % (ref 0–2)
BASOPHILS # BLD: 1.5 % (ref 0–2)
BILIRUB SERPL-MCNC: 0.2 MG/DL (ref 0.2–1)
BILIRUB SERPL-MCNC: 0.5 MG/DL (ref 0.2–1)
BILIRUB UR STRIP.AUTO-MCNC: NEGATIVE MG/DL
BUN SERPL-MCNC: 14 MG/DL (ref 7–18)
BUN SERPL-MCNC: 7 MG/DL (ref 7–18)
CALCIUM SERPL-MCNC: 7.7 MG/DL (ref 8.5–10)
CALCIUM SERPL-MCNC: 7.9 MG/DL (ref 8.5–10.1)
CHLORIDE SERPL-SCNC: 112 MMOL/L (ref 98–107)
CHLORIDE SERPL-SCNC: 114 MMOL/L (ref 98–107)
CO2 SERPL-SCNC: 20 MMOL/L (ref 21–32)
CO2 SERPL-SCNC: 22 MMOL/L (ref 21–32)
COLOR UR: (no result)
CREAT SERPL-MCNC: 0.6 MG/DL (ref 0.55–1.3)
CREAT SERPL-MCNC: 0.9 MG/DL (ref 0.55–1.3)
DACRYOCYTES BLD QL SMEAR: 0
DEPRECATED RDW RBC AUTO: 16.8 % (ref 11.6–15.6)
DEPRECATED RDW RBC AUTO: 17.2 % (ref 11.6–15.6)
DOHLE BOD BLD QL SMEAR: 0
EOSINOPHIL # BLD: 0.4 % (ref 0–4.5)
EOSINOPHIL # BLD: 0.7 % (ref 0–4.5)
EPITH CASTS URNS QL MICRO: (no result) /HPF
GLUCOSE SERPL-MCNC: 184 MG/DL (ref 74–106)
GLUCOSE SERPL-MCNC: 188 MG/DL (ref 74–106)
HCT VFR BLD CALC: 36.5 % (ref 32.4–45.2)
HCT VFR BLD CALC: 38 % (ref 32.4–45.2)
HELMET CELLS BLD QL SMEAR: 0
HGB BLD-MCNC: 11.6 GM/DL (ref 10.7–15.3)
HGB BLD-MCNC: 12.7 GM/DL (ref 10.7–15.3)
HOWELL-JOLLY BOD BLD QL SMEAR: 0
KETONES UR QL STRIP: NEGATIVE
LEUKOCYTE ESTERASE UR QL STRIP.AUTO: (no result)
LIPASE SERPL-CCNC: 103 U/L (ref 73–393)
LYMPHOCYTES # BLD: 4.1 % (ref 8–40)
LYMPHOCYTES # BLD: 5.4 % (ref 8–40)
MACROCYTES BLD QL: 0
MCH RBC QN AUTO: 29.9 PG (ref 25.7–33.7)
MCH RBC QN AUTO: 31.3 PG (ref 25.7–33.7)
MCHC RBC AUTO-ENTMCNC: 31.8 G/DL (ref 32–36)
MCHC RBC AUTO-ENTMCNC: 33.3 G/DL (ref 32–36)
MCV RBC: 93.7 FL (ref 80–96)
MCV RBC: 93.9 FL (ref 80–96)
MONOCYTES # BLD AUTO: 0.5 % (ref 3.8–10.2)
MONOCYTES # BLD AUTO: 1.5 % (ref 3.8–10.2)
MUCOUS THREADS URNS QL MICRO: (no result)
NEUTROPHILS # BLD: 92.2 % (ref 42.8–82.8)
NEUTROPHILS # BLD: 93.3 % (ref 42.8–82.8)
NITRITE UR QL STRIP: NEGATIVE
OVALOCYTES BLD QL SMEAR: 0
PH UR: 6 [PH] (ref 5–8)
PLATELET # BLD AUTO: 564 K/MM3 (ref 134–434)
PLATELET # BLD AUTO: 591 K/MM3 (ref 134–434)
PLATELET BLD QL SMEAR: (no result)
PMV BLD: 7.7 FL (ref 7.5–11.1)
PMV BLD: 8.1 FL (ref 7.5–11.1)
POTASSIUM SERPLBLD-SCNC: 3.7 MMOL/L (ref 3.5–5.1)
POTASSIUM SERPLBLD-SCNC: 3.9 MMOL/L (ref 3.5–5.1)
PROT SERPL-MCNC: 5.4 G/DL (ref 6.4–8.2)
PROT SERPL-MCNC: 6.2 G/DL (ref 6.4–8.2)
PROT UR QL STRIP: (no result)
PROT UR QL STRIP: NEGATIVE
RBC # BLD AUTO: 3.89 M/MM3 (ref 3.6–5.2)
RBC # BLD AUTO: 4.05 M/MM3 (ref 3.6–5.2)
ROULEAUX BLD QL SMEAR: 0
SICKLE CELLS BLD QL SMEAR: 0
SODIUM SERPL-SCNC: 140 MMOL/L (ref 136–145)
SODIUM SERPL-SCNC: 142 MMOL/L (ref 136–145)
SP GR UR: 1.04 (ref 1.01–1.03)
TARGETS BLD QL SMEAR: 0
TOXIC GRANULES BLD QL SMEAR: 0
UROBILINOGEN UR STRIP-MCNC: NEGATIVE MG/DL (ref 0.2–1)
WBC # BLD AUTO: 22.3 K/MM3 (ref 4–10.8)
WBC # BLD AUTO: 28.2 K/MM3 (ref 4–10)

## 2019-02-23 RX ADMIN — PIPERACILLIN SODIUM,TAZOBACTAM SODIUM SCH MLS/HR: 3; .375 INJECTION, POWDER, FOR SOLUTION INTRAVENOUS at 10:03

## 2019-02-23 RX ADMIN — HEPARIN SODIUM SCH UNIT: 5000 INJECTION, SOLUTION INTRAVENOUS; SUBCUTANEOUS at 14:10

## 2019-02-23 RX ADMIN — INSULIN ASPART SCH: 100 INJECTION, SOLUTION INTRAVENOUS; SUBCUTANEOUS at 21:55

## 2019-02-23 RX ADMIN — TOPIRAMATE SCH MG: 25 TABLET, FILM COATED ORAL at 11:30

## 2019-02-23 RX ADMIN — PIPERACILLIN SODIUM,TAZOBACTAM SODIUM SCH MLS/HR: 3; .375 INJECTION, POWDER, FOR SOLUTION INTRAVENOUS at 17:58

## 2019-02-23 RX ADMIN — INSULIN ASPART SCH UNITS: 100 INJECTION, SOLUTION INTRAVENOUS; SUBCUTANEOUS at 08:26

## 2019-02-23 RX ADMIN — HEPARIN SODIUM SCH UNIT: 5000 INJECTION, SOLUTION INTRAVENOUS; SUBCUTANEOUS at 21:55

## 2019-02-23 RX ADMIN — INSULIN ASPART SCH: 100 INJECTION, SOLUTION INTRAVENOUS; SUBCUTANEOUS at 16:43

## 2019-02-23 RX ADMIN — HEPARIN SODIUM SCH UNIT: 5000 INJECTION, SOLUTION INTRAVENOUS; SUBCUTANEOUS at 06:31

## 2019-02-23 RX ADMIN — INSULIN ASPART SCH: 100 INJECTION, SOLUTION INTRAVENOUS; SUBCUTANEOUS at 11:10

## 2019-02-23 RX ADMIN — MONTELUKAST SODIUM SCH MG: 10 TABLET, COATED ORAL at 21:55

## 2019-02-23 RX ADMIN — PANTOPRAZOLE SODIUM SCH MG: 40 TABLET, DELAYED RELEASE ORAL at 09:23

## 2019-02-23 NOTE — CONS
DATE OF CONSULTATION:  

 

DATE OF DICTATION:  02/23/2019

 

The patient is a 62-year-old female with a long history of carcinoid syndrome.  
She

is followed by Dr. Alvarez Maynard in [place].  She was admitted last night with

complaints of abdominal pain, nausea, vomiting, a diarrhea.  

 

The patient reports that after dinner she experienced epigastric pain which 
radiated

to the suprapubic area and to the left lower quadrant.  She also had associated

nausea and vomiting.  She has had loose bowel movements which have been not 
abnormal

for her with her carcinoid syndrome.  She presented to the emergency room where 
she

was evaluated.  She was noted to have abdominal tenderness in the epigastrium 
and

left flank.  A CAT scan of the abdomen and pelvis was performed and 
preliminarily was

negative for acute pathology.  Official reading is pending.  She was noted to 
have an

elevated white blood cell count of 28,000, and a lactic acidosis of 3.3.  
Cultures

were obtained and she was given a dose of Zosyn.  

 

At the present time she is awake and alert.  She continues to complain of pain

primarily in the epigastrium and the left upper quadrant.  She denies any 
recurrent

nausea or vomiting.  She has had loose bowel movements.  She denies any urinary 
tract

symptoms.  No dysuria or hematuria.  She has been afebrile.  

 

PAST MEDICAL HISTORY:  Positive for carcinoid diagnosed years ago.  She last 
received

chemotherapy in August of 2016.  She is presently on Sandostatin monthly and 
Xermelo.

 Past medical history also includes gastroesophageal reflux and migraine.  She 
was

seen by Dr. Kruger last week and had undergone an upper endoscopy in December of 2018

which she reports was normal.  Past medical history also positive for Proteus

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  She was diagnosed with Proteus 
mirabilis

urinary tract infection and bacteremia, subsequently developed osteomyelitis C4
, C5

and received a course of IV antibiotic therapy. 

 

ALLERGIES:  AFINITOR and ERYTHROMYCIN.

 

MEDICATIONS:  Singulair, Topamax, Ativan, Sandostatin, Creon, Xermelo.

 

SOCIAL HISTORY:  She resides in the community.  She is a nonsmoker, nondrinker.
  

 

REVIEW OF SYSTEMS:  

Neurologic:  No loss of consciousness, seizure activity, focal weakness. 

Cardiac:  Negative chest pain or palpitations.

Respiratory:  Negative cough or sputum production.

Gastrointestinal:  As per HPI.

Genitourinary:  Negative for urinary tract infection.

 

LABORATORY DATA:  White count 28.9, 94 neutrophils, 4 lymphocytes, 1 monocyte. 

Hematocrit 41.2.  Platelet count 748.  Urinalysis:  Two white cells.  Liver 
enzymes

normal.  BUN 14, creatinine 0.9.  Blood and urine cultures are pending.  Chest x
-ray

negative for acute infiltrate.  

 

PHYSICAL EXAMINATION: 

General:  She is awake and alert.  She is supine in bed, no acute distress.  

Vital Signs:  Temperature 98.6, blood pressure 138/57, pulse 86, regular. 

Respirations 19 per minute.  

HEENT:  Sclerae are anicteric.

Cardiovascular:  Heart sounds S1, S2.  

Lungs:  Clear bilaterally.  No rhonchi, rales, or wheezing.  

Abdomen:  Positive bowel sounds.  There is tenderness present in the 
epigastrium and

left lower quadrant as well as left flank.  No CVA tenderness.  

Extremities:  Edema 1+.  No rashes noted.  

 

IMPRESSION:  

1.  Abdominal pain syndrome of unclear etiology.

2.  History of carcinoid syndrome.

3.  Leukocytosis/lactic acidosis, rule out sepsis secondary to gastrointestinal

source.  

 

PLAN:  Await cultures.  Obtain GI consult today.  [facility] antibiotic coverage

for GI pathogens with Zosyn 3.375 g IV piggyback every 8 hours.  Follow up 
cultures. 

Will follow.  

 

Thank you for the kind referral.  

 

 

RANJEET VILLEDA M.D.

 

[id number]

DD: 02/23/2019 10:12

DT: 02/23/2019 10:35

Job #:  [id number]

MTDD

## 2019-02-23 NOTE — EKG
Test Reason : 

Blood Pressure : ***/*** mmHG

Vent. Rate : 068 BPM     Atrial Rate : 068 BPM

   P-R Int : 158 ms          QRS Dur : 084 ms

    QT Int : 420 ms       P-R-T Axes : 073 054 042 degrees

   QTc Int : 446 ms

 

NORMAL SINUS RHYTHM

NORMAL ECG

WHEN COMPARED WITH ECG OF 15-APR-2017 15:45,

NO SIGNIFICANT CHANGE WAS FOUND

Confirmed by EILEEN CHOUDHARY MD ([id number]) on 2/23/2019 10:15:43 PM

 

Referred By: MD BUSCH           Confirmed By:EILEEN CHOUDHARY MD

## 2019-02-23 NOTE — PN
Progress Note (short form)





- Note


Progress Note: 


ID CONSULT DICTATED


ABDOMINAL PAIN SYNDROME, UNCLEAR ETIOLOGY


HX CARCINOID


LEUKOCYTOSIS, LACTIC ACIDOSIS    R/O SEPSIS


AWAIT C/S


CONTINUE ZOSYN EMPIRICALLY 


GI CONSULT

## 2019-02-23 NOTE — HP
Admitting History and Physical





- Admission


Chief Complaint: epigastric pain radiating to the left lower quadratnt pain and 

back associated with fever, nausea and vomiting


History of Present Illness: 





this is a 62 y/o female with neuroendocrine tumor presented to the hospital for 

epigastric pain, LLQ pain, and lower back pain, according to the patient the 

pain started after dinner but it didn't resolve she took some peptobismol 

without the resolution of her symptoms.  patient denied any chills but she did 

state that she was warm.  patient had several bowl movements that are muddy 

brown that resolved, she stated that those were after the patient had the 

injection for the neruoendocine tumor.  denied any weight changes, vision, or 

appetite changes, patient denied any chest pain, or chest discomfort. 


History Source: Patient


Limitations to Obtaining History: No Limitations





- Past Medical History


Heme/Onc: Yes: Other (Neuroendocrine tumor)


Infectious Disease: Yes: Other (Osteomyelitis of the neck)





- Smoking History


Smoking history: Unknown if ever smoked


Have you smoked in the past 12 months: Yes





- Alcohol/Substance Use


Hx Alcohol Use: No





Home Medications





- Allergies


Allergies/Adverse Reactions: 


 Allergies











Allergy/AdvReac Type Severity Reaction Status Date / Time


 


everolimus [From Afinitor] AdvReac Severe SOB, Verified 10/22/17 12:39





   Myalgias,  





   Arthralgias,Edema  


 


erythromycin base AdvReac   Verified 10/22/17 12:39





[Erythromycin Base]     














- Home Medications


Home Medications: 


Ambulatory Orders





Montelukast Na [Singulair -] 10 mg PO HS 03/27/17 


Topiramate [Topamax] 50 mg PO DAILY 03/27/17 


Lorazepam [Ativan] 2 mg PO HS 04/15/17 


Octreotide Acetate [Sandostatin] 30 mcg IJ MONTHLY  ampul 08/31/17 


Marj Griffin 3,000 Units Capsule  02/22/19 


Telotristat Etiprate [Xermelo] 250 mg PO DAILY 02/22/19 











Review of Systems





- Review of Systems


Constitutional: reports: Fever


Eyes: reports: No Symptoms


HENT: reports: No Symptoms


Neck: reports: No Symptoms


Cardiovascular: reports: No Symptoms


Respiratory: reports: No Symptoms


Gastrointestinal: reports: Abdominal Pain, Constipation, Diarrhea, Vomiting


Genitourinary: reports: No Symptoms


Breasts: reports: No Symptoms Reported


Musculoskeletal: reports: No Symptoms


Integumentary: reports: No Symptoms


Neurological: reports: No Symptoms


Endocrine: reports: No Symptoms


Hematology/Lymphatic: reports: No Symptoms


Psychiatric: reports: No Symptoms





Physical Examination


Vital Signs: 


 Vital Signs











Temperature  98.6 F   02/23/19 05:36


 


Pulse Rate  86   02/23/19 05:36


 


Respiratory Rate  19   02/23/19 05:36


 


Blood Pressure  138/57 L  02/23/19 05:36


 


O2 Sat by Pulse Oximetry (%)  99   02/23/19 07:03











Constitutional: Yes: Well Nourished, No Distress, Calm


Eyes: Yes: WNL, Conjunctiva Clear, EOM Intact


HENT: Yes: WNL, Atraumatic, Normocephalic


Neck: Yes: WNL, Supple, Trachea Midline


Cardiovascular: Yes: WNL, Regular Rate and Rhythm, S1, S2


Respiratory: Yes: WNL, Regular, CTA Bilaterally


Gastrointestinal: Yes: Normal Bowel Sounds, Soft, Tenderness, Tenderness, 

Epigastrium, Other (left lower quadrant tenderness)


Musculoskeletal: Yes: WNL


Extremities: Yes: WNL


Edema: No


Peripheral Pulses WNL: Yes


Integumentary: Yes: WNL


Neurological: Yes: WNL, Alert, Oriented


...Motor Strength: WNL


Psychiatric: Yes: WNL, Alert, Oriented


Labs: 


 CBC, BMP





 02/23/19 06:00 





 02/23/19 06:00 











Imaging





- Results


Chest X-ray: Image Reviewed


Cat Scan: Image Reviewed





Problem List





- Problems


(1) SIRS (systemic inflammatory response syndrome)


Assessment/Plan: 


start the patient on Vancomycin 1 g stat 


start the patient on pipercillin stat 


trend the Lactic acid until it clears 


IVF hydration 150cc/hr 


give 1L bolus x 3 or until the Lactic acid clears 


consult ID 


consult GI for the diarrhea and abdominal pain 


Code(s): R65.10 - SIRS OF NON-INFECTIOUS ORIGIN W/O ACUTE ORGAN DYSFUNCTION   





(2) Abdominal pain


Assessment/Plan: 


tenderness with palpation 


f/u CT scan 


monitor the patient for acute changes in her bowel movements 


cw IV antibiotics 


GI consult 


Code(s): R10.9 - UNSPECIFIED ABDOMINAL PAIN   


Qualifiers: 


   Abdominal location: upper abdomen, unspecified   Qualified Code(s): R10.10 - 

Upper abdominal pain, unspecified   





(3) Leukocytosis


Assessment/Plan: 


Trend CBC 


c/w IV antibiotics 


ID consultation 








FEN


   Fluids: NS@75mL/hr


   Electrolytes: replete as indicated


   Nutrition: low sodium, diabetic


Code(s): D72.829 - ELEVATED WHITE BLOOD CELL COUNT, UNSPECIFIED   


Qualifiers: 


   Leukocytosis type: other   Qualified Code(s): D72.828 - Other elevated white 

blood cell count   





(4) Migraines


Assessment/Plan: 


c/w topimarate 





Code(s): G43.909 - MIGRAINE, UNSP, NOT INTRACTABLE, WITHOUT STATUS MIGRAINOSUS 

  





(5) Pre-diabetes


Assessment/Plan: 


insulin sliding scale 


Code(s): R73.03 - PREDIABETES   





Assessment/Plan





DVT ppx


enoxaparin 40mg daily 





GI ppx 


- pantaprozle 40mg daily

## 2019-02-24 LAB
ALBUMIN SERPL-MCNC: 3.1 G/DL (ref 3.4–5)
ALP SERPL-CCNC: 123 U/L (ref 45–117)
ALT SERPL-CCNC: 184 U/L (ref 13–61)
ANION GAP SERPL CALC-SCNC: 9 MMOL/L (ref 8–16)
AST SERPL-CCNC: 202 U/L (ref 15–37)
BASOPHILS # BLD: 0.2 % (ref 0–2)
BILIRUB SERPL-MCNC: 0.9 MG/DL (ref 0.2–1)
BUN SERPL-MCNC: 6 MG/DL (ref 7–18)
CALCIUM SERPL-MCNC: 8.4 MG/DL (ref 8.5–10)
CHLORIDE SERPL-SCNC: 107 MMOL/L (ref 98–107)
CO2 SERPL-SCNC: 20 MMOL/L (ref 21–32)
CREAT SERPL-MCNC: 0.6 MG/DL (ref 0.55–1.3)
DEPRECATED RDW RBC AUTO: 16.3 % (ref 11.6–15.6)
EOSINOPHIL # BLD: 0.4 % (ref 0–4.5)
GLUCOSE SERPL-MCNC: 132 MG/DL (ref 74–106)
HCT VFR BLD CALC: 36 % (ref 32.4–45.2)
HGB BLD-MCNC: 11.8 GM/DL (ref 10.7–15.3)
LYMPHOCYTES # BLD: 4.2 % (ref 8–40)
MCH RBC QN AUTO: 30.4 PG (ref 25.7–33.7)
MCHC RBC AUTO-ENTMCNC: 32.7 G/DL (ref 32–36)
MCV RBC: 93 FL (ref 80–96)
MONOCYTES # BLD AUTO: 3.7 % (ref 3.8–10.2)
NEUTROPHILS # BLD: 91.5 % (ref 42.8–82.8)
PLATELET # BLD AUTO: 479 K/MM3 (ref 134–434)
PMV BLD: 8.1 FL (ref 7.5–11.1)
POTASSIUM SERPLBLD-SCNC: 4.1 MMOL/L (ref 3.5–5.1)
PROT SERPL-MCNC: 5.7 G/DL (ref 6.4–8.2)
RBC # BLD AUTO: 3.86 M/MM3 (ref 3.6–5.2)
SODIUM SERPL-SCNC: 136 MMOL/L (ref 136–145)
WBC # BLD AUTO: 26.5 K/MM3 (ref 4–10.8)

## 2019-02-24 RX ADMIN — INSULIN ASPART SCH: 100 INJECTION, SOLUTION INTRAVENOUS; SUBCUTANEOUS at 12:57

## 2019-02-24 RX ADMIN — HEPARIN SODIUM SCH UNIT: 5000 INJECTION, SOLUTION INTRAVENOUS; SUBCUTANEOUS at 13:58

## 2019-02-24 RX ADMIN — PIPERACILLIN SODIUM,TAZOBACTAM SODIUM SCH MLS/HR: 3; .375 INJECTION, POWDER, FOR SOLUTION INTRAVENOUS at 01:18

## 2019-02-24 RX ADMIN — HEPARIN SODIUM SCH UNIT: 5000 INJECTION, SOLUTION INTRAVENOUS; SUBCUTANEOUS at 06:17

## 2019-02-24 RX ADMIN — PIPERACILLIN SODIUM,TAZOBACTAM SODIUM SCH MLS/HR: 3; .375 INJECTION, POWDER, FOR SOLUTION INTRAVENOUS at 17:19

## 2019-02-24 RX ADMIN — MONTELUKAST SODIUM SCH MG: 10 TABLET, COATED ORAL at 21:04

## 2019-02-24 RX ADMIN — ACETAMINOPHEN PRN MG: 500 TABLET, FILM COATED ORAL at 01:36

## 2019-02-24 RX ADMIN — PANTOPRAZOLE SODIUM SCH MG: 40 TABLET, DELAYED RELEASE ORAL at 09:18

## 2019-02-24 RX ADMIN — TOPIRAMATE SCH MG: 25 TABLET, FILM COATED ORAL at 09:18

## 2019-02-24 RX ADMIN — ACETAMINOPHEN PRN MG: 500 TABLET, FILM COATED ORAL at 08:30

## 2019-02-24 RX ADMIN — INSULIN ASPART SCH UNITS: 100 INJECTION, SOLUTION INTRAVENOUS; SUBCUTANEOUS at 06:17

## 2019-02-24 RX ADMIN — POLYETHYLENE GLYCOL 3350 SCH MG: 17 POWDER, FOR SOLUTION ORAL at 21:03

## 2019-02-24 RX ADMIN — ACETAMINOPHEN PRN MG: 10 INJECTION, SOLUTION INTRAVENOUS at 13:57

## 2019-02-24 RX ADMIN — INSULIN ASPART SCH: 100 INJECTION, SOLUTION INTRAVENOUS; SUBCUTANEOUS at 16:12

## 2019-02-24 RX ADMIN — ACETAMINOPHEN PRN MG: 10 INJECTION, SOLUTION INTRAVENOUS at 20:46

## 2019-02-24 RX ADMIN — SODIUM CHLORIDE SCH MLS/HR: 9 INJECTION, SOLUTION INTRAVENOUS at 13:57

## 2019-02-24 RX ADMIN — PIPERACILLIN SODIUM,TAZOBACTAM SODIUM SCH MLS/HR: 3; .375 INJECTION, POWDER, FOR SOLUTION INTRAVENOUS at 09:18

## 2019-02-24 RX ADMIN — INSULIN ASPART SCH: 100 INJECTION, SOLUTION INTRAVENOUS; SUBCUTANEOUS at 21:04

## 2019-02-24 NOTE — HOSP
Subjective





- Review of Symptoms


General: Yes: Chills


HEENT: Yes: Head Aches


Pulmonary: Yes: Cough


Gastrointestinal: Yes: Nausea, Abdominal Pain


Musculoskeletal: Yes: No Symptoms


Neurological: Yes: Weakness





Physical Examination


Vital Signs: 


 Vital Signs











Temperature  100.6 F H  02/24/19 12:59


 


Pulse Rate  96 H  02/24/19 12:59


 


Respiratory Rate  18   02/24/19 12:59


 


Blood Pressure  131/70   02/24/19 12:59


 


O2 Sat by Pulse Oximetry (%)  96   02/24/19 08:11











Constitutional: Yes: No Distress


Eyes: Yes: WNL


HENT: Yes: WNL


Neck: Yes: WNL


Cardiovascular: Yes: WNL


Respiratory: Yes: WNL


Gastrointestinal: Yes: Normal Bowel Sounds


Labs: 


 CBC, BMP





 02/24/19 07:00 





 02/24/19 07:00 











Hospitalist Encounter


Assessment: 





Received patient from [place].  Patient awake and alert verbalizing malaise 

and some abdominal pain and mild cough.


Patient has a surgical and GI consult for acute abdominal symptoms.  On 

antibiotics and IVF.


Having low grade temps.

## 2019-02-24 NOTE — CONSULT
Consult


Consult Specialty:: General Surgery


Referred by:: LAURA Patterson


Reason for Consultation:: abd pain, n/v, dilated stomach on CT, leukocytosis





- History of Present Illness


Chief Complaint: abdominal pain, n/v


History of Present Illness: 





62yo F with complicated medical history, including C4-5 osteomyelitis (now s/p 

fusion), and significant for rectal carcinoid with carcinoid syndrome, chronic 

diarrhea, managed with octreotide and Xermelo, who had severe diarrhea last week

, worse than ever before, "muddy," and on Friday evening developed acute 

abdominal pain from epigastric area down midline, then radiating toward LLQ and 

around to back, associated with an episode of N/V after trying Pepto-Bismol (

med came back up), prompting ER visit. She has not had a bowel movement since 

last diarrhea Friday morning, and pain in abdomen has been better in hospital 

since medication and some bowel rest. She denies F/C at home, no urinary 

complaints, but did have a URI about 3 wks ago with cough, nasal congestion and 

took a Z-willem which was done around 2/10. Residual symptoms are mild. She had 

missed her monthly octreotide injection week before last while out of town, but 

got it last Thursday, hoping it would help with the severe diarrhea, which did 

start to get better. 





She has had wbc in mid-high 20s, left shifted, elevated AST/ALT and alk phos 

mildly up, with normal bilirubin and lipase. CT was done with no oral contrast 

showing very dilated stomach, stool in colon, possible dilated biliary ducts, R 

renal cyst, and no obstruction. She had been at [facility], and was started on IVF 

and antibiotics per ID, but this morning developed fever, chills, rigors and 

was transferred to [facility]. GI saw her today, and has MRCP and echo pending. 

Surgery was asked to evaluate. 





She is seen and examined in bed. She is resting comfortably. She had meatloaf, 

mashed potatoes and peas for dinner, and was finally hungry for it, which she 

tolerated well. (This am at breakfast, she was not hungry and had only a few 

sips of tea.) She is passing gas, but has not had BM yet, and feels some "gas 

pains" at times. She is not currently nauseated and her pain is much better 

than Friday. Last temperature was 98.3; she notes a bitemporal headache today, 

which is not like the migraines she used to get (but has not for a long time).





- History Source


History Provided By: Patient, Medical Record


Limitations to Obtaining History: No Limitations





- Past Medical History


Pulmonary: Yes: Other (recent URI (3wks ago, Z-willem finished around 2/10))


Gastrointestinal: Yes: Cancer (rectal carcinoid), GERD, Hiatal Hernia, Other (

large rectal carcinoid dx'ed , never resected, had RT & chemorx, has liver 

lesions, on octreotide, diarrhea managed with Xermelo )


Reproductive: Yes: Postmenopausal


Heme/Onc: No: Current Chemotherapy (just monthly octreotide and Xermelo for 

diarrhea)


Infectious Disease: Yes: Other (Osteomyelitis of C4-C5)


Musculoskeletal: Yes: Osteoarthritis, Other (spinal stenosis, C4-C5 fusion 

after osteomyelitis)


Endocrine: Yes: Other (thyroid nodule)





- Past Surgical History


Past Surgical History: Yes: Arthrosocopy (left knee), Colonoscopy,  (x 

2), Tonsillectomy, Upper Endoscopy


Additional Surgical History: C4-C5 anterior discectomy and fusion for 

osteomyelitis .  right rotator cuff surgery.  Benign breast biopsies.  

Basal cell skin cancer excisions





- Alcohol/Substance Use


Hx Alcohol Use: Yes (wine 5 times a week)


History of Substance Use: reports: Marijuana (few times a month)





- Smoking History


Smoking history: Never smoked


Have you smoked in the past 12 months: No





- Social History


Usual Living Arrangement: With Spouse


ADL: Independent


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


History of Recent Travel: No





Home Medications





- Allergies


Allergies/Adverse Reactions: 


 Allergies











Allergy/AdvReac Type Severity Reaction Status Date / Time


 


everolimus [From Afinitor] AdvReac Severe SOB, Verified 10/22/17 12:39





   Myalgias,  





   Arthralgias,Edema  


 


erythromycin base AdvReac   Verified 10/22/17 12:39





[Erythromycin Base]     














- Home Medications


Home Medications: 


Ambulatory Orders





Montelukast Na [Singulair -] 10 mg PO HS 17 


Topiramate [Topamax] 50 mg PO DAILY 17 


Lorazepam [Ativan] 2 mg PO HS 04/15/17 


Octreotide Acetate [Sandostatin] 30 mcg IJ MONTHLY  ampul 17 


Creon  3,000 Units Capsule  19 


Telotristat Etiprate [Xermelo] 250 mg PO DAILY 19 











Family Disease History





- Family Disease History


Family Disease History: CA: Mother (CLL,  81), Other: Father (alive 96)





Review of Systems





- Review of Systems


Constitutional: reports: Chills (today in hospital), Fever (just today in 

hospital), Loss of Appetite


Eyes: reports: Other (wears glasses).  denies: Recent Change in Vision


HENT: reports: Nasal Congestion (some residual from URI 3wks ago).  denies: 

Difficult Swallowing, Throat Pain


Neck: denies: Swollen Glands, Tenderness


Cardiovascular: denies: Chest Pain, Palpitations


Respiratory: reports: Cough (URI 3 wks ago, little residual left), Other (some 

central chest pressure/discomfort with deep breathing, but no SOB).  denies: SOB


Gastrointestinal: reports: Abdominal Pain (with hpi), Diarrhea (chronic - more 

significant this last week than ever before, last was Friday am), Nausea, 

Vomiting (had once Friday night after pepto-bismol, just that came back up).  

denies: Constipation (no BM last 2 days only), Melena, Rectal Bleeding, 

Vomiting Blood


Genitourinary: denies: Burning, Dysuria


Musculoskeletal: reports: Back Pain (some lower left tenderness).  denies: 

Joint Pain, Joint Swelling


Integumentary: denies: Change in Color, Rash


Neurological: reports: Dizziness (just a little with hpi), Headache (bitemporal)

.  denies: Unsteady Gait


Hematology/Lymphatic: reports: Easily Bruised.  denies: Excessive Bleeding


Psychiatric: denies: Anxiety, Depression





Physical Exam


Vital Signs: 


 Vital Signs











Temperature  98.4 F   19 17:52


 


Pulse Rate  71   19 17:52


 


Respiratory Rate  18   19 17:52


 


Blood Pressure  133/63   19 17:52


 


O2 Sat by Pulse Oximetry (%)  96   19 08:11











Constitutional: Yes: Well Nourished, No Distress, Calm


Eyes: Yes: Conjunctiva Clear, EOM Intact


HENT: Yes: Atraumatic, Normocephalic, Nasal Congestion


Neck: Yes: Supple, Trachea Midline


Cardiovascular: Yes: Regular Rate and Rhythm.  No: Murmur


Respiratory: Yes: Regular, CTA Bilaterally


Gastrointestinal: Yes: Normal Bowel Sounds, Soft, Tenderness (mild epigastric, 

RUQ, and LLQ - no rebound or guarding), Other (well-healed Pfannenstiel scar).  

No: Distention, Vomiting


...Rectal Exam: Yes: Deferred


Renal/: No: CVA Tenderness - Left (no tenderness to percussion, but mild 

local direct tenderness in left lower back/flank), CVA Tenderness - Right


Musculoskeletal: No: Joint Stiffness, Joint Swelling


Extremities: No: Cool, Cyanosis


Edema: No


Peripheral Pulses WNL: Yes


Integumentary: Yes: Tattoos.  No: Jaundice, Rash


Neurological: Yes: Alert, Oriented


Psychiatric: Yes: Alert, Oriented


Labs: 


 CBC, BMP





 19 07:00 





 19 07:00 





 CMP











Sodium  136 mmol/L (136-145)   19  07:00    


 


Potassium  4.1 mmol/L (3.5-5.1)   19  07:00    


 


Chloride  107 mmol/L ()   19  07:00    


 


Carbon Dioxide  20 mmol/L (21-32)  L  19  07:00    


 


Anion Gap  9 MMOL/L (8-16)   19  07:00    


 


BUN  6 mg/dl (7-18)  L  19  07:00    


 


Creatinine  0.6 mg/dl (0.55-1.3)   19  07:00    


 


Creat Clearance w eGFR  > 60  (>60)   19  07:00    


 


POC Glucometer  123 UNITS ()   19  16:04    


 


Random Glucose  132 mg/dl ()  H  19  07:00    


 


Lactic Acid  1.2 mmol/L (0.4-2.0)   19  07:00    


 


Calcium  8.4 mg/dl (8.5-10)  L  19  07:00    


 


Total Bilirubin  0.9 mg/dl (0.2-1)   19  07:00    


 


AST  202 U/L (15-37)  H  19  07:00    


 


ALT  184 U/L (13-61)  H  19  07:00    


 


Alkaline Phosphatase  123 U/L ()  H D 19  07:00    


 


Creatine Kinase  83 U/L ()   19  22:30    


 


Troponin I  0.02 ng/ml (0.00-0.05)   19  07:55    


 


Total Protein  5.7 g/dl (6.4-8.2)  L  19  07:00    


 


Albumin  3.1 g/dl (3.4-5.0)  L  19  07:00    


 


Lipase  103 U/L ()   19  22:30    








  Urine Test Results











Urine Color  Straw   19  00:05    


 


Urine Appearance  Clear   19  00:05    


 


Urine pH  6.0  (5.0-8.0)   19  00:05    


 


Ur Specific Gravity  1.039  (1.010-1.035)  H  19  00:05    


 


Urine Protein  Negative  (NEGATIVE)   19  00:05    


 


Urine Glucose (UA)  3+  (NEGATIVE)  H  19  00:05    


 


Urine Ketones  Negative  (NEGATIVE)   19  00:05    


 


Urine Blood  Negative  (NEGATIVE)   19  00:05    


 


Urine Nitrite  Negative  (NEGATIVE)   19  00:05    


 


Urine Bilirubin  Negative  (<2.0 mg/dL)   19  00:05    


 


Ur Leukocyte Esterase  Trace  (NEGATIVE)   19  00:05    


 


Ur Epithelial Cells  Rare /HPF (FEW)   19  00:05    


 


Urine Mucus  Rare   19  00:05    








 Microbiology











 19 00:05 Urine Culture - Final





 Urine - Urine Clean Catch    NO GROWTH OBTAINED


 


 19 22:45 Blood Culture - Preliminary





 Blood - Peripheral Venous    NO GROWTH OBTAINED AFTER 24 HOURS, INCUBATION TO 

CONTINUE





    FOR 4 DAYS.


 


 19 22:45 Blood Culture - Preliminary





 Blood - Peripheral Venous    NO GROWTH OBTAINED AFTER 24 HOURS, INCUBATION TO 

CONTINUE





    FOR 4 DAYS.








no coags, no T&S





Imaging





- Results


Cat Scan: Report Reviewed, Image Reviewed (images reviewed - very dilated 

stomach, no enteral contrast, no clear transition or obstructive point, + stool 

in colon, more R than L, + R renal cyst, possibly mild biliary ductal dilation)


MRI: Pending (MRCP/MRI)





Problem List





- Problems


(1) Epigastric pain


Code(s): R10.13 - EPIGASTRIC PAIN   





(2) Abnormal liver enzymes


Code(s): R74.8 - ABNORMAL LEVELS OF OTHER SERUM ENZYMES   





(3) Leukocytosis


Code(s): D72.829 - ELEVATED WHITE BLOOD CELL COUNT, UNSPECIFIED   


Qualifiers: 


   Leukocytosis type: other   Qualified Code(s): D72.828 - Other elevated white 

blood cell count   





(4) Dilation of stomach, acute


Code(s): K31.0 - ACUTE DILATATION OF STOMACH   





(5) Dilated bile duct


Code(s): K83.8 - OTHER SPECIFIED DISEASES OF BILIARY TRACT   





(6) Carcinoid syndrome


Code(s): E34.0 - CARCINOID SYNDROME   





(7) Carcinoid tumor, rectal, malignant


Code(s): C7A.026 - MALIGNANT CARCINOID TUMOR OF THE RECTUM   





Assessment/Plan





admitted to medicine


pt still has mild residual symptoms from URI 3 wks ago and had antibiotics at 

that time


GI consultation noted


tolerated soft diet for dinner and was hungry, unlike early this am - no n/v, 

no bloating


not particularly distended, stomach has likely emptied since Friday's CT


if N/V recur, would keep NPO and consider reimaging, at least AXR


fever curve is down - continue to follow


unclear why pt has mild LLQ tenderness at this time





no BM 2 days only, but stool in colon, and no Xermelo for few days - anticipate 

bowel function will resume soon


(had severe diarrhea all week till Friday)


leukocytosis, upper abdominal pain, possible dilated bile ducts - agree with 

MRCP to evaluate biliary tract


may also need US, as CT did not show stones


per GI note, if ERCP indicated, may pursue at tertiary center





trend labs


get PT/PTT, T&S in am


serial exams


NPO after midnight in case procedure indicated after MRI/MRCP


antibiotics per ID


blood cultures negative to date





no acute surgical issues identified at this time


will follow with you and remain available for questions as needed





Thank you for the opportunity to participate in the care of this patient.

## 2019-02-24 NOTE — CON.GI
Consult


Consult Specialty:: Gastroenterology


Referred by:: Marianna Topete NP


Reason for Consultation:: Abdominal pain, fever and abnormal LFTs





- History of Present Illness


Chief Complaint: Burning epigastric,lower retrosternal pain,chills and vomiting


History of Present Illness: 





63F developed severe lower retrosternal and epigastric pain ( not colicky) 

radiating into her back and leading to an episode of vomiting after dinner on . Her  ate the same salmon meal and is fine. She had a WBC of 28K with 

normal LFTs on admission but now has rising transaminases, chills and a fever 

of 100.6. CT scan reveals a dilated CBD and stomach. She had a diarrheal 

illness preceeding these symptoms. The burning in this presentation is 

different from that which brought her to have an EGD with Dr Paco Talbot in 

 that revealed a hiatal hernia and GERD at [facility] and which had resolved with 

omeprazole which she has continued to take. She has not had a BM since the pain 

began. She was tolerating solids yesterday while hospitalized at [facility]. She was 

just transferred here from [facility].       


Her history is complex and includes having her last colonoscopy in  with Dr Paco Talbot which revealed a large rectal carcinoid. She saw Dr Villasenor at 

[facility] who advised surgical excision. She chose not to have this done when 

the possibility of a permanent colostomy was raised. She was then seen at Dr Matthew at [facility] in  who treated her with RT which failed 

to shrink the tumor. She then came under the care of Dr. Kush Maynard who 

treated her with Xeloda, Temodar  to  when they were stopped. She could 

not tolerate Affinitor. She is now followed by Dr. Mike Green at [facility] in 

coordination with Dr. Maynard who gives her somatostatin injections every 4 

weeks. Xermelo was added in  to control her diarrhea. These agents allow 

her to have a single semisolid BM daily. She was told that some liver lesions 

were found a few years ago. She has not never developed hot flushes or 

palpitations.       


She had C4-C5 anterior discectomy and fusion with Dr Phan Trotter in  for 

osteomyelitis.





- History Source


History Provided By: Patient


Limitations to Obtaining History: No Limitations





- Past Medical History


Gastrointestinal: Yes: GERD, Hiatal Hernia, Other (large rectal carcinoid dx'ed 

, never resected, had RT & chemorx, has liver lesions )


Infectious Disease: Yes: Other (Osteomyelitis of C4-C5)


Musculoskeletal: Yes: Osteoarthritis, Other (spinal stenosis, C4-C5 fusion 

after osteomyelitis)


Endocrine: Yes: Other (thyroid nodule)





- Past Surgical History


Past Surgical History: Yes: Arthrosocopy (left knee), Colonoscopy,  (x 

2), Tonsillectomy, Upper Endoscopy


Additional Surgical History: C4-C5 anterior discectomy and fusion for 

osteomyelitis .  right rotator cuff surgery.  Benign breast biopsies.  

Basal cell skin cancer excisions





- Alcohol/Substance Use


Hx Alcohol Use: Yes (wine 5 times a week)


History of Substance Use: reports: Marijuana





- Smoking History


Smoking history: Never smoked


Have you smoked in the past 12 months: No





- Social History


Usual Living Arrangement: With Spouse


ADL: Independent


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


Place of Birth: [place]


History of Recent Travel: No





Home Medications





- Allergies


Allergies/Adverse Reactions: 


 Allergies











Allergy/AdvReac Type Severity Reaction Status Date / Time


 


everolimus [From Afinitor] AdvReac Severe SOB, Verified 10/22/17 12:39





   Myalgias,  





   Arthralgias,Edema  


 


erythromycin base AdvReac   Verified 10/22/17 12:39





[Erythromycin Base]     














- Home Medications


Home Medications: 


Ambulatory Orders





Montelukast Na [Singulair -] 10 mg PO HS 17 


Topiramate [Topamax] 50 mg PO DAILY 17 


Lorazepam [Ativan] 2 mg PO HS 04/15/17 


Octreotide Acetate [Sandostatin] 30 mcg IJ MONTHLY  ampul 17 


Marj Griffin 3,000 Units Capsule  19 


Telotristat Etiprate [Xermelo] 250 mg PO DAILY 19 











Family Disease History





- Family Disease History


Family Disease History: CA: Mother (CLL,  81), Other: Father (alive 96)





Review of Systems





- Review of Systems


Constitutional: reports: Chills, Fever, Lethargy


Eyes: reports: No Symptoms


HENT: reports: No Symptoms


Neck: reports: No Symptoms


Cardiovascular: reports: Chest Pain (burning)


Respiratory: reports: No Symptoms


Gastrointestinal: reports: Abdominal Pain, Constipation, Diarrhea, Other (acid 

reflux)


Genitourinary: reports: No Symptoms


Musculoskeletal: reports: Back Pain





Physical Exam-GI


Vital Signs: 


 Vital Signs











Temperature  100.6 F H  19 12:59


 


Pulse Rate  96 H  19 12:59


 


Respiratory Rate  18   19 12:59


 


Blood Pressure  131/70   19 12:59


 


O2 Sat by Pulse Oximetry (%)  96   19 08:11








CBC,CMP











WBC  26.5 K/mm3 (4.0-10.8)  H  19  07:00    


 


RBC  3.86 M/mm3 (3.60-5.2)   19  07:00    


 


Hgb  11.8 GM/dl (10.7-15.3)   19  07:00    


 


Hct  36.0 % (32.4-45.2)   19  07:00    


 


MCV  93.0 fl (80-96)   19  07:00    


 


MCH  30.4 pg (25.7-33.7)   19  07:00    


 


MCHC  32.7 g/dl (32.0-36.0)   19  07:00    


 


RDW  16.3 % (11.6-15.6)  H  19  07:00    


 


Plt Count  479 K/MM3 (134-434)  H  19  07:00    


 


MPV  8.1 fl (7.5-11.1)   19  07:00    


 


Absolute Neuts (auto)  24.2 K/mm3  19  07:00    


 


Neutrophils %  91.5 % (42.8-82.8)  H  19  07:00    


 


Neutrophils % (Manual)  94.0 % (42.8-82.8)  H  19  06:00    


 


Band Neutrophils %  0.0 %  19  06:00    


 


Lymphocytes %  4.2 % (8-40)  L  19  07:00    


 


Lymphocytes % (Manual)  3.0 % (8-40)  L  19  06:00    


 


Monocytes %  3.7 % (3.8-10.2)  L  19  07:00    


 


Monocytes % (Manual)  0 % (3.8-10.2)  L  19  06:00    


 


Eosinophils %  0.4 % (0-4.5)   19  07:00    


 


Eosinophils % (Manual)  1.0 % (0-4.5)   19  06:00    


 


Basophils %  0.2 % (0-2.0)   19  07:00    


 


Basophils % (Manual)  2.0 % (0-2.0)   19  06:00    


 


Myelocytes % (Man)  0 % (0-2)   19  06:00    


 


Promyelocytes % (Man)  0 % (0-2)   19  06:00    


 


Blast Cells % (Manual)  0 % (0-0)   19  06:00    


 


Nucleated RBC %  0 % (0-0)   19  06:00    


 


Metamyelocytes  0 % (0-2)   19  06:00    


 


Hypochromia  0   19  06:00    


 


Toxic Granulation  0   19  06:00    


 


Dohle Bodies  0   19  06:00    


 


Platelet Estimate  Increased   19  06:00    


 


Platelet Comment  Rare giant plts   19  22:35    


 


Platelet Comment  Few large platelets   19  22:35    


 


Polychromasia  0   19  06:00    


 


Poikilocytosis  0   19  06:00    


 


Basophilic Stippling  0   19  06:00    


 


Anisocytosis  0   19  06:00    


 


Microcytosis  0   19  06:00    


 


Macrocytosis  0   19  06:00    


 


Spherocytes  0   19  06:00    


 


Sickle Cells  0   19  06:00    


 


Target Cells  0   19  06:00    


 


Tear Drop Cells  0   19  06:00    


 


Ovalocytes  0   19  06:00    


 


Stomatocytes  0   19  06:00    


 


Helmet Cells  0   19  06:00    


 


Barrientos-Lott Bodies  0   19  06:00    


 


Cabot Rings  0   19  06:00    


 


Glendy Cells  0   19  06:00    


 


Acanthocytes (Spur)  0   19  06:00    


 


Rouleaux  0   19  06:00    


 


Fragmented RBCs  0   19  06:00    


 


Schistocytes  0   19  06:00    


 


Sodium  136 mmol/L (136-145)   19  07:00    


 


Potassium  4.1 mmol/L (3.5-5.1)   19  07:00    


 


Chloride  107 mmol/L ()   19  07:00    


 


Carbon Dioxide  20 mmol/L (21-32)  L  19  07:00    


 


Anion Gap  9 MMOL/L (8-16)   19  07:00    


 


BUN  6 mg/dl (7-18)  L  19  07:00    


 


Creatinine  0.6 mg/dl (0.55-1.3)   19  07:00    


 


Creat Clearance w eGFR  > 60  (>60)   19  07:00    


 


POC Glucometer  155 UNITS ()   19  06:15    


 


Random Glucose  132 mg/dl ()  H  19  07:00    


 


Lactic Acid  1.2 mmol/L (0.4-2.0)   19  07:00    


 


Calcium  8.4 mg/dl (8.5-10)  L  19  07:00    


 


Total Bilirubin  0.9 mg/dl (0.2-1)   19  07:00    


 


AST  202 U/L (15-37)  H  19  07:00    


 


ALT  184 U/L (13-61)  H  19  07:00    


 


Alkaline Phosphatase  123 U/L ()  H D 19  07:00    


 


Creatine Kinase  83 U/L ()   19  22:30    


 


Troponin I  0.02 ng/ml (0.00-0.05)   19  07:55    


 


Total Protein  5.7 g/dl (6.4-8.2)  L  19  07:00    


 


Albumin  3.1 g/dl (3.4-5.0)  L  19  07:00    


 


Lipase  103 U/L ()   19  22:30    








 Current Medications











Generic Name Dose Route Start Last Admin





  Trade Name Freq  PRN Reason Stop Dose Admin


 


Acetaminophen  1,000 mg  19 13:10  19 13:57





  Ofirmev Injection -  IVPB   1,000 mg





  Q6H PRN   Administration





  PAIN OR FEVER   





     





     





     


 


Heparin Sodium (Porcine)  5,000 unit  19 06:00  19 13:58





  Heparin -  SQ   5,000 unit





  TID RAJ   Administration





     





     





     





     


 


Piperacillin Sod/Tazobactam  50 mls @ 100 mls/hr  19 10:15  19 09:18





  Sod 3.375 gm/ Dextrose  IVPB   100 mls/hr





  Q8H-IV RAJ   Administration





     





     





  Protocol   





     


 


Sodium Chloride  1,000 mls @ 100 mls/hr  19 13:15  19 13:57





  Normal Saline -  IV   100 mls/hr





  ASDIR RAJ   Administration





     





     





     





     


 


Insulin Aspart  1 vial  19 07:00  19 12:57





  Novolog Vial Sliding Scale -  SQ   Not Given





  [facility]   





     





     





  Protocol   





     


 


Lorazepam  2 mg  19 22:00  19 21:54





  Ativan -  PO   2 mg





  HS RAJ   Administration





     





     





     





     


 


Lorazepam  0.5 mg  19 10:36  





  Ativan -  PO   





  BID PRN   





  NAUSEA   





     





     





     


 


Montelukast Sodium  10 mg  19 22:00  19 21:55





  Singulair -  PO   10 mg





  HS RAJ   Administration





     





     





     





     


 


Morphine Sulfate  2 mg  19 13:07  





  Morphine Sulfate  IVPUSH   





  Q4H PRN   





  PAIN LEVEL 7 - 10   





     





     





     


 


Non-Formulary Medication  30 mcg  19 01:45  





  Octreotide Acetate [Sandostatin]  IJ   





  MONTHLY [facility]   





     





     





     





     


 


Non-Formulary Medication  250 mg  19 10:00  





  Telotristat Etiprate [Xermelo]  PO   





  DAILY [facility]   





     





     





     





     


 


Ondansetron HCl  4 mg  19 10:35  19 11:30





  Zofran Odt -  SL   4 mg





  Q6H PRN   Administration





  NAUSEA AND/OR VOMITING   





     





     





     











Constitutional: Yes: Anxious


Eyes: Yes: Conjunctiva Clear


HENT: Yes: Atraumatic


Neck: Yes: Supple


Cardiovascular: Yes: Regular Rate and Rhythm


Respiratory: Yes: CTA Bilaterally


Gastrointestinal Inspection: Yes: Scars (healed [place])


...Auscultate: Yes: Hypoactive Bowel Sounds


...Palpate: Yes: Soft, Other (nontender)


...Rectal Exam: Yes: Guaiac Negative (no masses, brown guaiac negative stool)


Edema: No


Peripheral Pulses WNL: Yes


Neurological: Yes: Alert, Oriented


Labs: 


 CBC, BMP





 19 07:00 





 19 07:00 





 Laboratory Tests











  19





  22:30 22:35 06:00


 


WBC   28.9 H 


 


Hgb   


 


Hct   


 


Total Bilirubin  0.8   0.2


 


AST  36   23


 


ALT  57   60


 


Alkaline Phosphatase  100   102


 


Lipase  103  














  19





  06:00 18:00 18:00


 


WBC  28.2 H  22.3 H 


 


Hgb   


 


Hct   


 


Total Bilirubin    0.5


 


AST    129 H


 


ALT    104 H


 


Alkaline Phosphatase    100


 


Lipase   














  19





  07:00 07:00


 


WBC  26.5 H 


 


Hgb  11.8 


 


Hct  36.0 


 


Total Bilirubin   0.9


 


AST   202 H


 


ALT   184 H


 


Alkaline Phosphatase   123 H D


 


Lipase  














Imaging





- Results


Cat Scan: Report Reviewed ( Study Date: 2019 23:42        [facility] Name: MCKAYLA TREVIZO  DEPARTMENT OF RADIOLOGY Phys: Tony Quispe MD   : 1955    Age: 63     Sex: F  [facility] Acct: [id number] Loc: /S  [address] [place] [address]. Exam Date: 19 

Status: [place] [address] Unit Number: [id number]  [id number]      

ACCESSION #:  [id number]    EXAM#:     TYPE/EXAM: RESULT:  [id number] CT/

ABDOMEN   PELVIS CT WITH CONTR  History :  abdominal pain   CT Scan of the 

abdomen and pelvis with oral and IV contrast; 100 cc Omnipaque 350     FINDINGS

:   Significant distention of stomach          The liver, spleen, and pancreas 

are unremarkable. Prominent left adrenal gland        There are no gallstones.. 

Possible mild biliary ductal dilatation        There is no hydronephrosis, 

solid renal masses or renal calculi.   6 cm right renal cyst        There is no 

retroperitoneal lymphadenopathy.         There is no abdominal aortic aneurysm.

     Appendix not identified           There are no fluid collections in the 

abdomen or pelvis.         There is no bowel obstruction.           The urinary 

bladder is unremarkable.   Uterus is either atrophic or surgically absent      

     IMPRESSION: Significant gastric distention. Possible mild biliary duct 

dilatation. Right  renal cysts   Appendix not identified   If symptoms persist 

consider surgical consultation and/or additional imaging             

PRELIMINARY REPORT PROVIDED BY RADIOLOGIST ON CALL                       

Reported By: Ilir Perez MD   19 1001      Technologist: Ayden Morrison  Transcribed Date/Time: 19 1001  : Ilir Perez  Printed Date/Time:     By:      Signed by: Ilir Perez    Signed 

on: 2019 10:03)





Problem List





- Problems


(1) Abnormal liver enzymes


Assessment/Plan: 


Given the array of symptoms with ductal dilation and fever I am concerned that 

Mckayla may have ascending cholangitis. I will order a MRCP. I have discussed the 

possible need for an ERCP with sphincterotomy and stone removal or stenting. I 

informed her of the potential for such complications as perforation, hemorrhage 

and ERCP induced pancreatitis that can led to lung and renal failure. We 

discussed the option of having this done at a tertiary care center. 


These could alternatively reflect a reactive hepatopathy to a recent viral 

gastroenteritis or bacterial sepsis. 


Code(s): R74.8 - ABNORMAL LEVELS OF OTHER SERUM ENZYMES   





(2) Rectal carcinoid tumor


Code(s): D3A.026 - BENIGN CARCINOID TUMOR OF THE RECTUM   





(3) Carcinoid syndrome


Code(s): E34.0 - CARCINOID SYNDROME   





(4) Chronic diarrhea


Code(s): K52.9 - NONINFECTIVE GASTROENTERITIS AND COLITIS, UNSPECIFIED   





(5) Acid reflux disease


Code(s): K21.9 - GASTRO-ESOPHAGEAL REFLUX DISEASE WITHOUT ESOPHAGITIS   





(6) Dilated bile duct


Code(s): K83.8 - OTHER SPECIFIED DISEASES OF BILIARY TRACT   





(7) Dilation of stomach, acute


Assessment/Plan: 


This could reflect cholangitis or perhaps a post viral gastroparesis. 


Code(s): K31.0 - ACUTE DILATATION OF STOMACH   





(8) Abdominal pain


Code(s): R10.9 - UNSPECIFIED ABDOMINAL PAIN   


Qualifiers: 


   Abdominal location: upper abdomen, unspecified   Qualified Code(s): R10.10 - 

Upper abdominal pain, unspecified   





(9) Leukocytosis


Code(s): D72.829 - ELEVATED WHITE BLOOD CELL COUNT, UNSPECIFIED   


Qualifiers: 


   Leukocytosis type: other   Qualified Code(s): D72.828 - Other elevated white 

blood cell count   





(10) SIRS (systemic inflammatory response syndrome)


Code(s): R65.10 - SIRS OF NON-INFECTIOUS ORIGIN W/O ACUTE ORGAN DYSFUNCTION   





(11) Osteomyelitis


Code(s): M86.9 - OSTEOMYELITIS, UNSPECIFIED   


Qualifiers: 


   Osteomyelitis type: subacute   Osteomyelitis location: other site   

Qualified Code(s): M86.28 - Subacute osteomyelitis, other site   





(12) Sepsis


Code(s): A41.9 - SEPSIS, UNSPECIFIED ORGANISM   





(13) Constipation


Assessment/Plan: 


I suspect this is ileus associated with cholangitis or perhap post viral. I 

will start Miralax 


Code(s): K59.00 - CONSTIPATION, UNSPECIFIED   





Assessment/Plan





Impression: 


Given her rising liver enzymes, dilated CBD, fever, pain  and vomiting, 

ascending cholangitis needs to be excluded


May alternatively has a postviral reactive hepatopathy and gastroparesis


Constipation is likely ileus as she usually has carcinoid syndrome diarrhea


Rectal carcinoid- not clear what role this is playing but it could be a portal 

of bacteremia


GERD





Plan:


Antibiotics as per Dr Kilpatrick. I discussed the case with him


MRCP


Miralax


Follow LFTs


Soft diet tonight but NPO after midnight should ERCP become necessary


Continue PPI


Echocardiogram to exclude SBE

## 2019-02-24 NOTE — PN
Progress Note, Physician


History of Present Illness: 





AWAKE, SUPINE IN BED


NO ACUTE DISTRESS


THIS AM C/O R FLANK PAIN


DENIES DYSURIA/ HEMATURIA


REPORTS TO ME NO BM OR FLATUS SINCE ADMISSION


LOW GRADE FEVER NOTED


WBC REMAINS ELEVATED


LFTS RISING


BC  NO GROWTH











- Current Medication List


Current Medications: 


Active Medications





Acetaminophen (Tylenol -)  500 mg PO Q6H PRN


   PRN Reason: PAIN


   Last Admin: 02/24/19 08:30 Dose:  500 mg


Heparin Sodium (Porcine) (Heparin -)  5,000 unit SQ TID RAJ


   Last Admin: 02/24/19 06:17 Dose:  5,000 unit


Piperacillin Sod/Tazobactam (Sod 3.375 gm/ Dextrose)  50 mls @ 100 mls/hr IVPB 

Q8H-IV RAJ; Protocol


   Last Admin: 02/24/19 09:18 Dose:  100 mls/hr


Insulin Aspart (Novolog Vial Sliding Scale -)  1 vial SQ ACHS RAJ; Protocol


   Last Admin: 02/24/19 06:17 Dose:  2 units


Lorazepam (Ativan -)  2 mg PO HS RAJ


   Last Admin: 02/23/19 21:54 Dose:  2 mg


Montelukast Sodium (Singulair -)  10 mg PO HS RAJ


   Last Admin: 02/23/19 21:55 Dose:  10 mg


Non-Formulary Medication (Octreotide Acetate [Sandostatin])  30 mcg IJ MONTHLY 

[facility]


Non-Formulary Medication (Telotristat Etiprate [Xermelo])  250 mg PO DAILY [facility]


Pantoprazole Sodium (Protonix -)  40 mg PO DAILY [facility]


   Last Admin: 02/24/19 09:18 Dose:  40 mg


Topiramate (Topamax -)  50 mg PO DAILY [facility]


   Last Admin: 02/24/19 09:18 Dose:  50 mg


Tramadol HCl (Ultram -)  50 mg PO Q4H PRN


   PRN Reason: PAIN LEVEL 4 - 6


   Last Admin: 02/24/19 01:36 Dose:  50 mg











- Objective


Vital Signs: 


 Vital Signs











Temperature  99.2 F   02/24/19 06:00


 


Pulse Rate  76   02/24/19 06:00


 


Respiratory Rate  17   02/24/19 08:11


 


Blood Pressure  130/58 L  02/24/19 06:00


 


O2 Sat by Pulse Oximetry (%)  96   02/24/19 08:11











Constitutional: Yes: No Distress


Eyes: Yes: Conjunctiva Clear


Cardiovascular: Yes: Regular Rate and Rhythm, S1, S2


Respiratory: Yes: CTA Bilaterally


Gastrointestinal: Yes: Normal Bowel Sounds, Soft, Other (+ BOWEL SOUNDS   NO 

ABDOMINAL TENDERNESS  ELICITED).  No: Tenderness


Extremities: No: Calf Tenderness


Edema: Yes


Labs: 


 CBC, BMP





 02/24/19 07:00 





 02/24/19 07:00 











Assessment/Plan





ABDOMINAL PAIN SYNDROME, LEUKOCYTOSIS, LOW GRADE FEVER,


 ELEVATED LFTS, GASTRIC DISTENTION/ ? DILATED HEPATIC DUCTS


 ON CT


HX CARCINOID





AWAITING GI EVALUATION. RECALL CONSULT.


ADVISE SURGICAL CONSULT


CONTINUE ZOSYN


CASE DISCUSSED WITH HOSPITALIST


 AND ABOVE RECOMMENDATIONS MADE CLEAR


IF ABOVE CONSULTATIONS CAN NOT BE DONE


TODAY TRANSFER PT TO ANOTHER FACILITY

## 2019-02-24 NOTE — PN
Physical Exam: 


SUBJECTIVE: Patient seen and examined. She complains of nausea and RLQ pain and 

R back pain. 








OBJECTIVE:





 Vital Signs











 Period  Temp  Pulse  Resp  BP Sys/Bethea  Pulse Ox


 


 Last 24 Hr  99.2 F-99.9 F  64-76  16-19  130-143/47-63  








PE


Neuro: alert, awake, cn 2-12intact


Pulm: CTAB


CV: s1 s2 rrr no mrg


Abd: RLQ tenderness to palpation, R back tenderness to palpation, - Wilson soft 

abd, LLQ pain resolved 


Ext: no le edema 














 Laboratory Results - last 24 hr











  02/23/19 02/23/19 02/24/19





  18:00 21:43 06:15


 


WBC   


 


RBC   


 


Hgb   


 


Hct   


 


MCV   


 


MCH   


 


MCHC   


 


RDW   


 


Plt Count   


 


MPV   


 


Absolute Neuts (auto)   


 


Neutrophils %   


 


Neutrophils % (Manual)   


 


Band Neutrophils %   


 


Lymphocytes %   


 


Lymphocytes % (Manual)   


 


Monocytes %   


 


Monocytes % (Manual)   


 


Eosinophils %   


 


Eosinophils % (Manual)   


 


Basophils %   


 


Basophils % (Manual)   


 


Myelocytes % (Man)   


 


Promyelocytes % (Man)   


 


Blast Cells % (Manual)   


 


Metamyelocytes   


 


Hypochromia   


 


Toxic Granulation   


 


[place]oh[place] Bodies   


 


Platelet Estimate   


 


Polychromasia   


 


Poikilocytosis   


 


Basophilic Stippling   


 


Anisocytosis   


 


Microcytosis   


 


Macrocytosis   


 


Spherocytes   


 


Sickle Cells   


 


Target Cells   


 


Tear Drop Cells   


 


Ovalocytes   


 


Stomatocytes   


 


Helmet Cells   


 


[place] Bodies   


 


Cab[place] Rings   


 


Glendy Cells   


 


Acanthocytes (Spur)   


 


Rouleaux   


 


Fragmented RBCs   


 


Schistocytes   


 


Sodium   


 


Potassium   


 


Chloride   


 


Carbon Dioxide   


 


Anion Gap   


 


BUN   


 


Creatinine   


 


Creat Clearance w eGFR   


 


POC Glucometer   134  155


 


Random Glucose   


 


Lactic Acid  2.6 H*  


 


Calcium   


 


Total Bilirubin   


 


AST   


 


ALT   


 


Alkaline Phosphatase   


 


Total Protein   


 


Albumin   














  02/24/19 02/24/19





  07:00 07:00


 


WBC  26.5 H 


 


RBC  3.86 


 


Hgb  11.8 


 


Hct  36.0 


 


MCV  93.0 


 


MCH  30.4 


 


MCHC  32.7 


 


RDW  16.3 H 


 


Plt Count  479 H 


 


MPV  8.1 


 


Absolute Neuts (auto)  24.2 


 


Neutrophils %  91.5 H 


 


Neutrophils % (Manual)  


 


Band Neutrophils %  


 


Lymphocytes %  4.2 L 


 


Lymphocytes % (Manual)  


 


Monocytes %  3.7 L 


 


Monocytes % (Manual)  


 


Eosinophils %  0.4 


 


Eosinophils % (Manual)  


 


Basophils %  0.2 


 


Basophils % (Manual)  


 


Myelocytes % (Man)  


 


Promyelocytes % (Man)  


 


Blast Cells % (Manual)  


 


Metamyelocytes  


 


Hypochromia  


 


Toxic Granulation  


 


[place] Bodies  


 


Platelet Estimate  


 


Polychromasia  


 


Poikilocytosis  


 


Basophilic Stippling  


 


Anisocytosis  


 


Microcytosis  


 


Macrocytosis  


 


Spherocytes  


 


Sickle Cells  


 


Target Cells  


 


Tear Drop Cells  


 


Ovalocytes  


 


Stomatocytes  


 


Helmet Cells  


 


[place] Bodies  


 


C[place]ot Rings  


 


[place] Cells  


 


Acanthocytes (Spur)  


 


Rouleaux  


 


Fragmented RBCs  


 


Schistocytes  


 


Sodium   136


 


Potassium   4.1


 


Chloride   107


 


Carbon Dioxide   20 L


 


Anion Gap   9


 


BUN   6 L


 


Creatinine   0.6


 


Creat Clearance w eGFR   > 60


 


POC Glucometer  


 


Random Glucose   132 H


 


Lactic Acid  


 


Calcium   8.4 L


 


Total Bilirubin   0.9


 


AST   202 H


 


ALT   184 H


 


Alkaline Phosphatase   123 H D


 


Total Protein   5.7 L


 


Albumin   3.1 L








Active Medications











Generic Name Dose Route Start Last Admin





  Trade Name Freq  PRN Reason Stop Dose Admin


 


Acetaminophen  500 mg  02/23/19 06:04  02/24/19 08:30





  Tylenol -  PO   500 mg





  Q6H PRN   Administration





  PAIN   





     





     





     


 


Heparin Sodium (Porcine)  5,000 unit  02/23/19 06:00  02/24/19 06:17





  Heparin -  SQ   5,000 unit





  TID RAJ   Administration





     





     





     





     


 


Piperacillin Sod/Tazobactam  50 mls @ 100 mls/hr  02/23/19 10:15  02/24/19 09:18





  Sod 3.375 gm/ Dextrose  IVPB   100 mls/hr





  Q8H-IV RAJ   Administration





     





     





  Protocol   





     


 


Insulin Aspart  1 vial  02/23/19 07:00  02/24/19 06:17





  Novolog Vial Sliding Scale -  SQ   2 units





  ACHS RAJ   Administration





     





     





  Protocol   





     


 


Lorazepam  2 mg  02/23/19 22:00  02/23/19 21:54





  Ativan -  PO   2 mg





  HS RAJ   Administration





     





     





     





     


 


Montelukast Sodium  10 mg  02/23/19 22:00  02/23/19 21:55





  Singulair -  PO   10 mg





  HS RAJ   Administration





     





     





     





     


 


Non-Formulary Medication  30 mcg  02/23/19 01:45  





  Octreotide Acetate [Sandostatin]  IJ   





  MONTHLY RAJ   





     





     





     





     


 


Non-Formulary Medication  250 mg  02/23/19 10:00  





  Telotristat Etiprate [Xermelo]  PO   





  DAILY RAJ   





     





     





     





     


 


Pantoprazole Sodium  40 mg  02/23/19 10:00  02/24/19 09:18





  Protonix -  PO   40 mg





  DAILY RAJ   Administration





     





     





     





     


 


Topiramate  50 mg  02/23/19 11:33  02/24/19 09:18





  Topamax -  PO   50 mg





  DAILY RAJ   Administration





     





     





     





     


 


Tramadol HCl  50 mg  02/23/19 15:42  02/24/19 01:36





  Ultram -  PO   50 mg





  Q4H PRN   Administration





  PAIN LEVEL 4 - 6   





     





     





     








Assessment: 63 year old female with neuroendocrine tumor presented to the 

hospital for epigastric pain, LLQ pain, and lower back pain. 





Plan: 





1. SIRS


- Unclear etiology, however likely GI in nature, LFTs rising


- Pt takes somatostain possible side effect of drug 


- AM Lactic 1.2


- Cont IVF


- Leukocytosis mildly improved 


- Cont Zosyn, vanco 


- ID following 





2. Abdominal pain, transaminitis 


- Pt will be transferred to [facility], GI evaluation and Surgery consulted 


- CT shows 


- Stop Tylenol, protonix 


- pt states she had 4 days of diarrhea, which have now stopped 





3. Migraines


- Cont Topimarate 





4. DVT ppx


- Heparin sq 





Dispo


- Transfer to [facility] today 





Visit type





- Emergency Visit


Emergency Visit: Yes


ED Registration Date: 02/23/19


Care time: The patient presented to the Emergency Department on the above date 

and was hospitalized for further evaluation of their emergent condition.





- New Patient


This patient is new to me today: Yes


Date on this admission: 02/24/19





- Critical Care


Critical Care patient: No

## 2019-02-25 VITALS — SYSTOLIC BLOOD PRESSURE: 128 MMHG | DIASTOLIC BLOOD PRESSURE: 70 MMHG | TEMPERATURE: 99 F

## 2019-02-25 VITALS — HEART RATE: 73 BPM

## 2019-02-25 LAB
ALBUMIN SERPL-MCNC: 3 G/DL (ref 3.4–5)
ALP SERPL-CCNC: 172 U/L (ref 45–117)
ALT SERPL-CCNC: 237 U/L (ref 13–61)
AMYLASE SERPL-CCNC: 19 U/L (ref 25–115)
ANION GAP SERPL CALC-SCNC: 7 MMOL/L (ref 8–16)
ANISOCYTOSIS BLD QL: (no result)
APTT BLD: 20 SECONDS (ref 25.2–36.5)
AST SERPL-CCNC: 135 U/L (ref 15–37)
BASOPHILS # BLD: 1.5 % (ref 0–2)
BILIRUB CONJ SERPL-MCNC: 0.2 MG/DL (ref 0–0.2)
BILIRUB SERPL-MCNC: 0.5 MG/DL (ref 0.2–1)
BUN SERPL-MCNC: 8 MG/DL (ref 7–18)
CALCIUM SERPL-MCNC: 7.9 MG/DL (ref 8.5–10.1)
CHLORIDE SERPL-SCNC: 112 MMOL/L (ref 98–107)
CO2 SERPL-SCNC: 22 MMOL/L (ref 21–32)
CREAT SERPL-MCNC: 0.6 MG/DL (ref 0.55–1.3)
DEPRECATED RDW RBC AUTO: 17.3 % (ref 11.6–15.6)
EOSINOPHIL # BLD: 0.9 % (ref 0–4.5)
GLUCOSE SERPL-MCNC: 109 MG/DL (ref 74–106)
HCT VFR BLD CALC: 40.2 % (ref 32.4–45.2)
HGB BLD-MCNC: 13.5 GM/DL (ref 10.7–15.3)
INR BLD: 1.1 (ref 0.83–1.09)
LIPASE SERPL-CCNC: 69 U/L (ref 73–393)
LYMPHOCYTES # BLD: 4.7 % (ref 8–40)
MACROCYTES BLD QL: (no result)
MCH RBC QN AUTO: 31.3 PG (ref 25.7–33.7)
MCHC RBC AUTO-ENTMCNC: 33.7 G/DL (ref 32–36)
MCV RBC: 92.9 FL (ref 80–96)
MONOCYTES # BLD AUTO: 0.5 % (ref 3.8–10.2)
NEUTROPHILS # BLD: 92.4 % (ref 42.8–82.8)
PLATELET # BLD AUTO: 443 K/MM3 (ref 134–434)
PLATELET BLD QL SMEAR: NORMAL
PMV BLD: 7.9 FL (ref 7.5–11.1)
POTASSIUM SERPLBLD-SCNC: 3.8 MMOL/L (ref 3.5–5.1)
PROT SERPL-MCNC: 6.1 G/DL (ref 6.4–8.2)
PT PNL PPP: 13 SEC (ref 9.7–13)
RBC # BLD AUTO: 4.32 M/MM3 (ref 3.6–5.2)
SODIUM SERPL-SCNC: 141 MMOL/L (ref 136–145)
WBC # BLD AUTO: 26.4 K/MM3 (ref 4–10)

## 2019-02-25 RX ADMIN — INSULIN ASPART SCH: 100 INJECTION, SOLUTION INTRAVENOUS; SUBCUTANEOUS at 13:13

## 2019-02-25 RX ADMIN — ACETAMINOPHEN PRN MG: 10 INJECTION, SOLUTION INTRAVENOUS at 04:57

## 2019-02-25 RX ADMIN — INSULIN ASPART SCH: 100 INJECTION, SOLUTION INTRAVENOUS; SUBCUTANEOUS at 17:21

## 2019-02-25 RX ADMIN — INSULIN ASPART SCH: 100 INJECTION, SOLUTION INTRAVENOUS; SUBCUTANEOUS at 06:08

## 2019-02-25 RX ADMIN — PIPERACILLIN SODIUM,TAZOBACTAM SODIUM SCH MLS/HR: 3; .375 INJECTION, POWDER, FOR SOLUTION INTRAVENOUS at 03:14

## 2019-02-25 RX ADMIN — PIPERACILLIN SODIUM,TAZOBACTAM SODIUM SCH MLS/HR: 3; .375 INJECTION, POWDER, FOR SOLUTION INTRAVENOUS at 10:14

## 2019-02-25 RX ADMIN — SODIUM CHLORIDE SCH MLS/HR: 9 INJECTION, SOLUTION INTRAVENOUS at 15:46

## 2019-02-25 RX ADMIN — SODIUM CHLORIDE SCH: 9 INJECTION, SOLUTION INTRAVENOUS at 13:45

## 2019-02-25 RX ADMIN — POLYETHYLENE GLYCOL 3350 SCH: 17 POWDER, FOR SOLUTION ORAL at 10:14

## 2019-02-25 RX ADMIN — PIPERACILLIN SODIUM,TAZOBACTAM SODIUM SCH MLS/HR: 3; .375 INJECTION, POWDER, FOR SOLUTION INTRAVENOUS at 17:38

## 2019-02-25 NOTE — PN
Progress Note (short form)





- Note


Progress Note: 





GI NOte: MRCP confirms a CBD stone. Mckayla does not want me to attempt ERCP. I 

have arranged transfer to [facility] to Dr Eleuterio Sena who has accepted her and 

who will do her ERCP. I informed Krystal her nurse. 





Problem List





- Problems


(1) Abnormal liver enzymes


Code(s): R74.8 - ABNORMAL LEVELS OF OTHER SERUM ENZYMES   





(2) Rectal carcinoid tumor


Code(s): D3A.026 - BENIGN CARCINOID TUMOR OF THE RECTUM   





(3) Carcinoid syndrome


Code(s): E34.0 - CARCINOID SYNDROME   





(4) Chronic diarrhea


Code(s): K52.9 - NONINFECTIVE GASTROENTERITIS AND COLITIS, UNSPECIFIED   





(5) Acid reflux disease


Code(s): K21.9 - GASTRO-ESOPHAGEAL REFLUX DISEASE WITHOUT ESOPHAGITIS   





(6) Dilated bile duct


Code(s): K83.8 - OTHER SPECIFIED DISEASES OF BILIARY TRACT   





(7) Dilation of stomach, acute


Code(s): K31.0 - ACUTE DILATATION OF STOMACH   





(8) Abdominal pain


Code(s): R10.9 - UNSPECIFIED ABDOMINAL PAIN   


Qualifiers: 


   Abdominal location: upper abdomen, unspecified   Qualified Code(s): R10.10 - 

Upper abdominal pain, unspecified   





(9) Leukocytosis


Code(s): D72.829 - ELEVATED WHITE BLOOD CELL COUNT, UNSPECIFIED   


Qualifiers: 


   Leukocytosis type: other   Qualified Code(s): D72.828 - Other elevated white 

blood cell count   





(10) SIRS (systemic inflammatory response syndrome)


Code(s): R65.10 - SIRS OF NON-INFECTIOUS ORIGIN W/O ACUTE ORGAN DYSFUNCTION   





(11) Osteomyelitis


Code(s): M86.9 - OSTEOMYELITIS, UNSPECIFIED   


Qualifiers: 


   Osteomyelitis type: subacute   Osteomyelitis location: other site   

Qualified Code(s): M86.28 - Subacute osteomyelitis, other site   





(12) Sepsis


Code(s): A41.9 - SEPSIS, UNSPECIFIED ORGANISM   





(13) Constipation


Code(s): K59.00 - CONSTIPATION, UNSPECIFIED

## 2019-02-25 NOTE — PN
Progress Note, Physician


History of Present Illness: 





AWAKE, SUPINE IN BED


NO ACUTE DISTRESS


TODAY C/O B/L FLANK PAIN


NO C/O ABDOMINAL PAIN   NO N/V


REPORTS  NO BM  SINCE ADMISSION. +PASSING FLATUS


LOW GRADE FEVER PAST 24HR


WBC REMAINS ELEVATED


LFTS ELEVATED


BC  NO GROWTH


MRCP, US PENDING











- Current Medication List


Current Medications: 


Active Medications





Acetaminophen (Ofirmev Injection -)  1,000 mg IVPB Q6H PRN


   PRN Reason: PAIN OR FEVER


   Last Admin: 02/25/19 04:57 Dose:  1,000 mg


Al Hydroxide/Mg Hydroxide (Mylanta Oral Suspension -)  30 ml PO Q6H PRN


   PRN Reason: DYSPEPSIA


Heparin Sodium (Porcine) (Heparin -)  5,000 unit SQ TID RAJ


   Last Admin: 02/24/19 13:58 Dose:  5,000 unit


Piperacillin Sod/Tazobactam (Sod 3.375 gm/ Dextrose)  50 mls @ 100 mls/hr IVPB 

Q8H-IV RAJ; Protocol


   Last Admin: 02/25/19 10:14 Dose:  100 mls/hr


Sodium Chloride (Normal Saline -)  1,000 mls @ 100 mls/hr IV ASDIR RAJ


   Last Admin: 02/24/19 13:57 Dose:  100 mls/hr


Insulin Aspart (Novolog Vial Sliding Scale -)  1 vial SQ ACHS RAJ; Protocol


   Last Admin: 02/25/19 06:08 Dose:  Not Given


Lorazepam (Ativan -)  2 mg PO HS RAJ


   Last Admin: 02/24/19 21:03 Dose:  2 mg


Montelukast Sodium (Singulair -)  10 mg PO HS RAJ


   Last Admin: 02/24/19 21:04 Dose:  10 mg


Morphine Sulfate (Morphine Sulfate)  2 mg IVPUSH Q4H PRN


   PRN Reason: PAIN LEVEL 7 - 10


   Last Admin: 02/25/19 10:13 Dose:  2 mg


Non-Formulary Medication (Octreotide Acetate [Sandostatin])  30 mcg IJ MONTHLY 

[facility]


Non-Formulary Medication (Telotristat Etiprate [Xermelo])  250 mg PO DAILY [facility]


Ondansetron HCl (Zofran Odt -)  4 mg SL Q6H PRN


   PRN Reason: NAUSEA AND/OR VOMITING


   Last Admin: 02/24/19 11:30 Dose:  4 mg


Polyethylene Glycol (Miralax (For Daily Use) -)  17 gm PO BID RAJ


   Last Admin: 02/25/19 10:14 Dose:  Not Given











- Objective


Vital Signs: 


 Vital Signs











Temperature  98.8 F   02/25/19 09:32


 


Pulse Rate  75   02/25/19 09:32


 


Respiratory Rate  20   02/25/19 09:32


 


Blood Pressure  135/72   02/25/19 09:32


 


O2 Sat by Pulse Oximetry (%)  98   02/24/19 21:00











Constitutional: Yes: No Distress


Eyes: Yes: Conjunctiva Clear


Cardiovascular: Yes: Regular Rate and Rhythm, S1, S2


Respiratory: Yes: CTA Bilaterally


Gastrointestinal: Yes: Normal Bowel Sounds, Soft.  No: Tenderness


Edema: Yes


Edema: LLE: 1+, RLE: 1+


Labs: 


 CBC, BMP





 02/25/19 10:12 





 02/25/19 06:45 





 INR, PTT











INR  1.10  (0.83-1.09)  H  02/25/19  06:45    














Assessment/Plan





ABDOMINAL PAIN SYNDROME, LEUKOCYTOSIS, LOW GRADE FEVER,


 ELEVATED LFTS, GASTRIC DISTENTION/ ? DILATED HEPATIC DUCTS


 


HX CARCINOID








AWAIT MRCP, US


CONTINUE ZOSYN 


GI/ SURGICAL FOLLOW UP


IF SCANS UREVEALING, OBTAIN HEM/ONC CONSULT


  FOR PERSISTANT LEUKOCYTOSIS/ HX CARCINOID

## 2019-02-25 NOTE — CONSULT
Consultation: 


REQUESTING PROVIDER:





CONSULT REQUEST: We have been asked to medically evaluate this patient for 

leukocytosis


HISTORY OF PRESENT ILLNESS:


63 year old female with a history of rectal carcinoid tumor diagnosed 2011, s/p 

RT and chemotherapy (no resection), currently being treated with q monthly 

somatostatin injections who presents with abdominal pain that radiated to back, 

nausea, vomiting, fevers, we were called to evaluate for leukocytosis. 





Abdominal imaging revealing dilated CBD; cholecystitis. 





PMH:


GERD, Hiatal Hernia, Other (large rectal carcinoid dx'ed 2011, never resected, 

had RT & chemorx, has liver lesions,  Osteoarthritis, Other (spinal stenosis, C4

-C5 fusion after osteomyelitis,thyroid nodule; Basal cell Ca excision











REVIEW OF SYSTEMS:


CONSTITUTIONAL: 


Positive: fever,  generalized weakness, malaise, loss of appetite


Absent:  fever, chills, diaphoresis,e, weight change


HEENT: 


Absent:  rhinorrhea, nasal congestion, throat pain, throat swelling, difficulty 

swallowing, mouth swelling, ear pain, eye pain, visual changes


CARDIOVASCULAR: 


Absent: chest pain, syncope, palpitations, irregular heart rate, lightheadedness

, peripheral edema


RESPIRATORY: 


Absent: cough, shortness of breath, dyspnea with exertion, orthopnea, wheezing, 

stridor, hemoptysis


GASTROINTESTINAL:


Positive:  abdominal pain, abdominal distension, nausea, vomiting


Absent:, diarrhea, constipation, melena, hematochezia


GENITOURINARY: 


Absent: dysuria, frequency, urgency, hesitancy, hematuria, flank pain, genital 

pain


MUSCULOSKELETAL: 


Absent: myalgia, arthralgia, joint swelling, back pain, neck pain


SKIN: 


Absent: rash, itching, pallor


HEMATOLOGIC/IMMUNOLOGIC: 


Absent: easy bleeding, easy bruising, lymphadenopathy, frequent infections


ENDOCRINE:


Absent: unexplained weight gain, unexplained weight loss, heat intolerance, 

cold intolerance


NEUROLOGIC: 


Absent: headache, focal weakness or paresthesias, dizziness, unsteady gait, 

seizure, mental status changes, bladder or bowel incontinence


PSYCHIATRIC: 


Absent: anxiety, depression, suicidal or homicidal ideation, hallucinations.





PHYSICAL EXAMINATION





 Vital Signs - 24 hr











  02/24/19 02/24/19 02/24/19





  17:52 21:00 21:21


 


Temperature 98.4 F  98.3 F


 


Pulse Rate 71  74


 


Respiratory 18 18 20





Rate   


 


Blood Pressure 133/63  139/74


 


O2 Sat by Pulse  98 





Oximetry (%)   














  02/25/19 02/25/19 02/25/19





  09:00 09:32 14:10


 


Temperature  98.8 F 99.2 F


 


Pulse Rate  75 73


 


Respiratory 20 20 20





Rate   


 


Blood Pressure  135/72 144/75


 


O2 Sat by Pulse 98  





Oximetry (%)   














GENERAL: Awake, alert, and fully oriented, in no acute distress.


LUNGS: decreased breath sounds


Breast/axilla: no masses/lumps or nipple discharge


HEART: Regular rate and rhythm, normal S1 and S2 without murmur, rub or gallop.


ABDOMEN:soft; tender to light palpation


MUSCULOSKELETAL: Normal range of motion at all joints. No bony deformities or 

tenderness. No CVA tenderness.


UPPER EXTREMITIES: 2+ pulses, warm, well-perfused. No cyanosis. No clubbing. 

Cap refill <2 seconds. No peripheral edema.


LOWER EXTREMITIES: 2+ pulses, warm, well-perfused. No calf tenderness. No 

peripheral edema. 


NEUROLOGICAL:  Cranial nerves II-XII intact. Normal speech. 


PSYCHIATRIC: Cooperative. Good eye contact. Appropriate mood and affect.


SKIN: Warm, dry, normal turgor, no rashes or lesions noted. 











 Laboratory Results - last 24 hr











  02/24/19 02/24/19 02/25/19





  16:04 20:50 06:04


 


WBC   


 


RBC   


 


Hgb   


 


Hct   


 


MCV   


 


MCH   


 


MCHC   


 


RDW   


 


Plt Count   


 


MPV   


 


Absolute Neuts (auto)   


 


Neutrophils %   


 


Neutrophils % (Manual)   


 


Band Neutrophils %   


 


Lymphocytes %   


 


Lymphocytes % (Manual)   


 


Monocytes %   


 


Monocytes % (Manual)   


 


Eosinophils %   


 


Eosinophils % (Manual)   


 


Basophils %   


 


Basophils % (Manual)   


 


Myelocytes % (Man)   


 


Promyelocytes % (Man)   


 


Blast Cells % (Manual)   


 


Nucleated RBC %   


 


Metamyelocytes   


 


Hypochromia   


 


Platelet Estimate   


 


Polychromasia   


 


Poikilocytosis   


 


Anisocytosis   


 


Microcytosis   


 


Macrocytosis   


 


PT with INR   


 


INR   


 


PTT (Actin FS)   


 


Sodium   


 


Potassium   


 


Chloride   


 


Carbon Dioxide   


 


Anion Gap   


 


BUN   


 


Creatinine   


 


Creat Clearance w eGFR   


 


POC Glucometer  123  111  130


 


Random Glucose   


 


Hemoglobin A1c %   


 


Calcium   


 


Total Bilirubin   


 


Direct Bilirubin   


 


AST   


 


ALT   


 


Alkaline Phosphatase   


 


C-Reactive Protein   


 


Total Protein   


 


Albumin   


 


Total Amylase   


 


Lipase   


 


Blood Type   


 


Antibody Screen   














  02/25/19 02/25/19 02/25/19





  06:45 06:45 06:45


 


WBC    


 


RBC    


 


Hgb    


 


Hct    


 


MCV    


 


MCH    


 


MCHC    


 


RDW    


 


Plt Count    


 


MPV    


 


Absolute Neuts (auto)    


 


Neutrophils %    


 


Neutrophils % (Manual)  No Result Required.  


 


Band Neutrophils %   


 


Lymphocytes %    


 


Lymphocytes % (Manual)   


 


Monocytes %    


 


Monocytes % (Manual)   


 


Eosinophils %    


 


Eosinophils % (Manual)   


 


Basophils %    


 


Basophils % (Manual)   


 


Myelocytes % (Man)   


 


Promyelocytes % (Man)   


 


Blast Cells % (Manual)   


 


Nucleated RBC %  0  


 


Metamyelocytes   


 


Hypochromia   


 


Platelet Estimate   


 


Polychromasia   


 


Poikilocytosis   


 


Anisocytosis   


 


Microcytosis   


 


Macrocytosis   


 


PT with INR    13.00


 


INR    1.10 H


 


PTT (Actin FS)    20.0 L


 


Sodium   141 


 


Potassium   3.8 


 


Chloride   112 H 


 


Carbon Dioxide   22 


 


Anion Gap   7 L 


 


BUN   8 


 


Creatinine   0.6 


 


Creat Clearance w eGFR   > 60 


 


POC Glucometer   


 


Random Glucose   109 H 


 


Hemoglobin A1c %   


 


Calcium   7.9 L 


 


Total Bilirubin   0.5 


 


Direct Bilirubin   0.2 


 


AST   135 H 


 


ALT   237 H 


 


Alkaline Phosphatase   172 H 


 


C-Reactive Protein   8.3 H 


 


Total Protein   6.1 L 


 


Albumin   3.0 L 


 


Total Amylase   19 L 


 


Lipase   69 L 


 


Blood Type   


 


Antibody Screen   














  02/25/19 02/25/19 02/25/19





  06:45 06:47 10:12


 


WBC    26.4 H


 


RBC    4.32


 


Hgb    13.5


 


Hct    40.2


 


MCV    92.9


 


MCH    31.3


 


MCHC    33.7


 


RDW    17.3 H


 


Plt Count    443 H D


 


MPV    7.9


 


Absolute Neuts (auto)    24.4 H


 


Neutrophils %    92.4 H


 


Neutrophils % (Manual)    96.0 H


 


Band Neutrophils %    0.0


 


Lymphocytes %    4.7 L


 


Lymphocytes % (Manual)    3.0 L


 


Monocytes %    0.5 L


 


Monocytes % (Manual)    1 L D


 


Eosinophils %    0.9  D


 


Eosinophils % (Manual)    0.0  D


 


Basophils %    1.5


 


Basophils % (Manual)    0.0


 


Myelocytes % (Man)    0


 


Promyelocytes % (Man)    0


 


Blast Cells % (Manual)    0


 


Nucleated RBC %    0


 


Metamyelocytes    0


 


Hypochromia    0


 


Platelet Estimate    Normal


 


Polychromasia    0


 


Poikilocytosis    0


 


Anisocytosis    1+


 


Microcytosis    0


 


Macrocytosis    1+


 


PT with INR   


 


INR   


 


PTT (Actin FS)   


 


Sodium   


 


Potassium   


 


Chloride   


 


Carbon Dioxide   


 


Anion Gap   


 


BUN   


 


Creatinine   


 


Creat Clearance w eGFR   


 


POC Glucometer   


 


Random Glucose   


 


Hemoglobin A1c %   Cancelled 


 


Calcium   


 


Total Bilirubin   


 


Direct Bilirubin   


 


AST   


 


ALT   


 


Alkaline Phosphatase   


 


C-Reactive Protein   


 


Total Protein   


 


Albumin   


 


Total Amylase   


 


Lipase   


 


Blood Type  A POSITIVE  


 


Antibody Screen  Negative  














  02/25/19 02/25/19





  10:12 11:16


 


WBC  


 


RBC  


 


Hgb  


 


Hct  


 


MCV  


 


MCH  


 


MCHC  


 


RDW  


 


Plt Count  


 


MPV  


 


Absolute Neuts (auto)  


 


Neutrophils %  


 


Neutrophils % (Manual)  


 


Band Neutrophils %  


 


Lymphocytes %  


 


Lymphocytes % (Manual)  


 


Monocytes %  


 


Monocytes % (Manual)  


 


Eosinophils %  


 


Eosinophils % (Manual)  


 


Basophils %  


 


Basophils % (Manual)  


 


Myelocytes % (Man)  


 


Promyelocytes % (Man)  


 


Blast Cells % (Manual)  


 


Nucleated RBC %  


 


Metamyelocytes  


 


Hypochromia  


 


Platelet Estimate  


 


Polychromasia  


 


Poikilocytosis  


 


Anisocytosis  


 


Microcytosis  


 


Macrocytosis  


 


PT with INR  


 


INR  


 


PTT (Actin FS)  


 


Sodium  


 


Potassium  


 


Chloride  


 


Carbon Dioxide  


 


Anion Gap  


 


BUN  


 


Creatinine  


 


Creat Clearance w eGFR  


 


POC Glucometer   81


 


Random Glucose  


 


Hemoglobin A1c %  6.3 


 


Calcium  


 


Total Bilirubin  


 


Direct Bilirubin  


 


AST  


 


ALT  


 


Alkaline Phosphatase  


 


C-Reactive Protein  


 


Total Protein  


 


Albumin  


 


Total Amylase  


 


Lipase  


 


Blood Type  


 


Antibody Screen  








Active Medications











Generic Name Dose Route Start Last Admin





  Trade Name Freq  PRN Reason Stop Dose Admin


 


Acetaminophen  1,000 mg  02/24/19 13:10  02/25/19 04:57





  Ofirmev Injection -  IVPB   1,000 mg





  Q6H PRN   Administration





  PAIN OR FEVER   





     





     





     


 


Al Hydroxide/Mg Hydroxide  30 ml  02/24/19 15:23  





  Mylanta Oral Suspension -  PO   





  Q6H PRN   





  DYSPEPSIA   





     





     





     


 


Heparin Sodium (Porcine)  5,000 unit  02/23/19 06:00  02/24/19 13:58





  Heparin -  SQ   5,000 unit





  TID RAJ   Administration





     





     





     





     


 


Piperacillin Sod/Tazobactam  50 mls @ 100 mls/hr  02/23/19 10:15  02/25/19 10:14





  Sod 3.375 gm/ Dextrose  IVPB   100 mls/hr





  Q8H-IV RAJ   Administration





     





     





  Protocol   





     


 


Sodium Chloride  1,000 mls @ 100 mls/hr  02/24/19 13:15  02/25/19 13:45





  Normal Saline -  IV   Not Given





  ASDIR RAJ   





     





     





     





     


 


Insulin Aspart  1 vial  02/23/19 07:00  02/25/19 13:13





  Novolog Vial Sliding Scale -  SQ   Not Given





  ACHS RAJ   





     





     





  Protocol   





     


 


Lorazepam  2 mg  02/23/19 22:00  02/24/19 21:03





  Ativan -  PO   2 mg





  HS RAJ   Administration





     





     





     





     


 


Montelukast Sodium  10 mg  02/23/19 22:00  02/24/19 21:04





  Singulair -  PO   10 mg





  HS RAJ   Administration





     





     





     





     


 


Morphine Sulfate  2 mg  02/24/19 13:07  02/25/19 10:13





  Morphine Sulfate  IVPUSH   2 mg





  Q4H PRN   Administration





  PAIN LEVEL 7 - 10   





     





     





     


 


Non-Formulary Medication  30 mcg  02/23/19 01:45  





  Octreotide Acetate [Sandostatin]  IJ   





  MONTHLY [facility]   





     





     





     





     


 


Non-Formulary Medication  250 mg  02/23/19 10:00  





  Telotristat Etiprate [Xermelo]  PO   





  DAILY [facility]   





     





     





     





     


 


Ondansetron HCl  4 mg  02/24/19 10:35  02/24/19 11:30





  Zofran Odt -  SL   4 mg





  Q6H PRN   Administration





  NAUSEA AND/OR VOMITING   





     





     





     


 


Polyethylene Glycol  17 gm  02/24/19 22:00  02/25/19 10:14





  Miralax (For Daily Use) -  PO   Not Given





  BID [facility]   





     





     





     





     


 


Topiramate  50 mg  02/25/19 12:30  02/25/19 13:13





  Topamax -  PO   50 mg





  DAILY [facility]   Administration





     





     





     





     











ASSESSMENT/PLAN:


This is a 62 y/o female with neuroendocrine tumor presented to the hospital for 

epigastric pain; leukocytosis, found to have cholecystitis





Rectal Carcinoind tumor


acute cholecystitis





-leukocytosis secondary to cholecystitis; treat infection


-being transferred to [facility] for ERCP;











Dispo: We will continue to follow the patient. Thank you for this consultative 

opportunity.




















Visit type





- Emergency Visit


Emergency Visit: Yes


ED Registration Date: 02/24/19


Care time: The patient presented to the Emergency Department on the above date 

and was hospitalized for further evaluation of their emergent condition.





- New Patient


This patient is new to me today: Yes


Date on this admission: 02/25/19





- Critical Care


Critical Care patient: No

## 2019-02-25 NOTE — PN
Physical Exam: 


SUBJECTIVE: Patient seen and examined








OBJECTIVE:





 Vital Signs











 Period  Temp  Pulse  Resp  BP Sys/Bethea  Pulse Ox


 


 Last 24 Hr  98.3 F-100.6 F  71-96  18-20  131-139/63-74  98











GENERAL: The patient is awake, alert, and fully oriented, in no acute distress.


HEAD: Normal with no signs of trauma.


EYES: PERRL, extraocular movements intact, sclera anicteric, conjunctiva clear. 

No ptosis. 


ENT: Ears normal, nares patent, oropharynx clear without exudates, moist mucous 


membranes.


NECK: Trachea midline, full range of motion, supple. 


LUNGS: Breath sounds equal, clear to auscultation bilaterally, no wheezes, no 

crackles, no 


accessory muscle use. 


HEART: Regular rate and rhythm, S1, S2 without murmur, rub or gallop.


ABDOMEN: Soft, nontender, nondistended, normoactive bowel sounds, no guarding, 

no 


rebound, no hepatosplenomegaly, no masses.


EXTREMITIES: 2+ pulses, warm, well-perfused, no edema. 


NEUROLOGICAL: Cranial nerves II through XII grossly intact. Normal speech, gait 

not 


observed.


PSYCH: Normal mood, normal affect.


SKIN: Warm, dry, normal turgor, no rashes or lesions noted














 Laboratory Results - last 24 hr











  02/24/19 02/24/19 02/25/19





  16:04 20:50 06:04


 


WBC   


 


RBC   


 


Hgb   


 


Hct   


 


MCV   


 


MCH   


 


MCHC   


 


RDW   


 


Plt Count   


 


MPV   


 


Absolute Neuts (auto)   


 


Neutrophils %   


 


Neutrophils % (Manual)   


 


Lymphocytes %   


 


Monocytes %   


 


Eosinophils %   


 


Basophils %   


 


Nucleated RBC %   


 


PT with INR   


 


INR   


 


PTT (Actin FS)   


 


Sodium   


 


Potassium   


 


Chloride   


 


Carbon Dioxide   


 


Anion Gap   


 


BUN   


 


Creatinine   


 


Creat Clearance w eGFR   


 


POC Glucometer  123  111  130


 


Random Glucose   


 


Hemoglobin A1c %   


 


Calcium   


 


Total Bilirubin   


 


Direct Bilirubin   


 


AST   


 


ALT   


 


Alkaline Phosphatase   


 


C-Reactive Protein   


 


Total Protein   


 


Albumin   


 


Total Amylase   


 


Lipase   


 


Blood Type   


 


Antibody Screen   














  02/25/19 02/25/19 02/25/19





  06:45 06:45 06:45


 


WBC    


 


RBC    


 


Hgb    


 


Hct    


 


MCV    


 


MCH    


 


MCHC    


 


RDW    


 


Plt Count    


 


MPV    


 


Absolute Neuts (auto)    


 


Neutrophils %    


 


Neutrophils % (Manual)  No Result Required.  


 


Lymphocytes %    


 


Monocytes %    


 


Eosinophils %    


 


Basophils %    


 


Nucleated RBC %  0  


 


PT with INR    13.00


 


INR    1.10 H


 


PTT (Actin FS)    20.0 L


 


Sodium   141 


 


Potassium   3.8 


 


Chloride   112 H 


 


Carbon Dioxide   22 


 


Anion Gap   7 L 


 


BUN   8 


 


Creatinine   0.6 


 


Creat Clearance w eGFR   > 60 


 


POC Glucometer   


 


Random Glucose   109 H 


 


Hemoglobin A1c %   


 


Calcium   7.9 L 


 


Total Bilirubin   0.5 


 


Direct Bilirubin   0.2 


 


AST   135 H 


 


ALT   237 H 


 


Alkaline Phosphatase   172 H 


 


C-Reactive Protein   8.3 H 


 


Total Protein   6.1 L 


 


Albumin   3.0 L 


 


Total Amylase   19 L 


 


Lipase   69 L 


 


Blood Type   


 


Antibody Screen   














  02/25/19 02/25/19 02/25/19





  06:45 06:47 10:12


 


WBC    26.4 H


 


RBC    4.32


 


Hgb    13.5


 


Hct    40.2


 


MCV    92.9


 


MCH    31.3


 


MCHC    33.7


 


RDW    17.3 H


 


Plt Count    443 H D


 


MPV    7.9


 


Absolute Neuts (auto)    24.4 H


 


Neutrophils %    92.4 H


 


Neutrophils % (Manual)   


 


Lymphocytes %    4.7 L


 


Monocytes %    0.5 L


 


Eosinophils %    0.9  D


 


Basophils %    1.5


 


Nucleated RBC %    0


 


PT with INR   


 


INR   


 


PTT (Actin FS)   


 


Sodium   


 


Potassium   


 


Chloride   


 


Carbon Dioxide   


 


Anion Gap   


 


BUN   


 


Creatinine   


 


Creat Clearance w eGFR   


 


POC Glucometer   


 


Random Glucose   


 


Hemoglobin A1c %   Cancelled 


 


Calcium   


 


Total Bilirubin   


 


Direct Bilirubin   


 


AST   


 


ALT   


 


Alkaline Phosphatase   


 


C-Reactive Protein   


 


Total Protein   


 


Albumin   


 


Total Amylase   


 


Lipase   


 


Blood Type  A POSITIVE  


 


Antibody Screen  Negative  














  02/25/19





  11:16


 


WBC 


 


RBC 


 


Hgb 


 


Hct 


 


MCV 


 


MCH 


 


MCHC 


 


RDW 


 


Plt Count 


 


MPV 


 


Absolute Neuts (auto) 


 


Neutrophils % 


 


Neutrophils % (Manual) 


 


Lymphocytes % 


 


Monocytes % 


 


Eosinophils % 


 


Basophils % 


 


Nucleated RBC % 


 


PT with INR 


 


INR 


 


PTT (Actin FS) 


 


Sodium 


 


Potassium 


 


Chloride 


 


Carbon Dioxide 


 


Anion Gap 


 


BUN 


 


Creatinine 


 


Creat Clearance w eGFR 


 


POC Glucometer  81


 


Random Glucose 


 


Hemoglobin A1c % 


 


Calcium 


 


Total Bilirubin 


 


Direct Bilirubin 


 


AST 


 


ALT 


 


Alkaline Phosphatase 


 


C-Reactive Protein 


 


Total Protein 


 


Albumin 


 


Total Amylase 


 


Lipase 


 


Blood Type 


 


Antibody Screen 








Active Medications











Generic Name Dose Route Start Last Admin





  Trade Name Freq  PRN Reason Stop Dose Admin


 


Acetaminophen  1,000 mg  02/24/19 13:10  02/25/19 04:57





  Ofirmev Injection -  IVPB   1,000 mg





  Q6H PRN   Administration





  PAIN OR FEVER   





     





     





     


 


Al Hydroxide/Mg Hydroxide  30 ml  02/24/19 15:23  





  Mylanta Oral Suspension -  PO   





  Q6H PRN   





  DYSPEPSIA   





     





     





     


 


Heparin Sodium (Porcine)  5,000 unit  02/23/19 06:00  02/24/19 13:58





  Heparin -  SQ   5,000 unit





  TID RAJ   Administration





     





     





     





     


 


Piperacillin Sod/Tazobactam  50 mls @ 100 mls/hr  02/23/19 10:15  02/25/19 10:14





  Sod 3.375 gm/ Dextrose  IVPB   100 mls/hr





  Q8H-IV RAJ   Administration





     





     





  Protocol   





     


 


Sodium Chloride  1,000 mls @ 100 mls/hr  02/24/19 13:15  02/24/19 13:57





  Normal Saline -  IV   100 mls/hr





  ASDIR RAJ   Administration





     





     





     





     


 


Insulin Aspart  1 vial  02/23/19 07:00  02/25/19 06:08





  Novolog Vial Sliding Scale -  SQ   Not Given





  ACHS RAJ   





     





     





  Protocol   





     


 


Lorazepam  2 mg  02/23/19 22:00  02/24/19 21:03





  Ativan -  PO   2 mg





  HS RAJ   Administration





     





     





     





     


 


Montelukast Sodium  10 mg  02/23/19 22:00  02/24/19 21:04





  Singulair -  PO   10 mg





  HS RAJ   Administration





     





     





     





     


 


Morphine Sulfate  2 mg  02/24/19 13:07  02/25/19 10:13





  Morphine Sulfate  IVPUSH   2 mg





  Q4H PRN   Administration





  PAIN LEVEL 7 - 10   





     





     





     


 


Non-Formulary Medication  30 mcg  02/23/19 01:45  





  Octreotide Acetate [Sandostatin]  IJ   





  MONTHLY [facility]   





     





     





     





     


 


Non-Formulary Medication  250 mg  02/23/19 10:00  





  Telotristat Etiprate [Xermelo]  PO   





  DAILY [facility]   





     





     





     





     


 


Ondansetron HCl  4 mg  02/24/19 10:35  02/24/19 11:30





  Zofran Odt -  SL   4 mg





  Q6H PRN   Administration





  NAUSEA AND/OR VOMITING   





     





     





     


 


Polyethylene Glycol  17 gm  02/24/19 22:00  02/25/19 10:14





  Miralax (For Daily Use) -  PO   Not Given





  BID [facility]   





     





     





     





     











ASSESSMENT/PLAN:

## 2019-02-25 NOTE — ECHO
______________________________________________________________________________



Name: KYLER TREVIZO                                  Exam:Adult Echocardiogram

MRN: [id number]         Study Date: 02/25/2019 02:25 PM

Age: 63 yrs

______________________________________________________________________________



Reason For Study: fever,wbc 28k exclude sbe

Height: 64 in        Weight: 146 lb        BSA: 1.7 m2



______________________________________________________________________________





______________________________________________________________________________

Procedure

A complete two-dimensional transthoracic echocardiogram was performed (2D, M-mode, Doppler and color 
flow

Doppler).

Left Ventricle

The left ventricle is normal in size. Left ventricular systolic function is normal. Ejection Fraction
 = >70%.

No regional wall motion abnormalities noted.

Right Ventricle

The right ventricle is normal size. The right ventricular systolic function is normal.

Atria

The left atrial size is normal. Right atrial size is normal.

Mitral Valve

There is mild mitral annular calcification. There is trace to mild mitral regurgitation.

Tricuspid Valve

The tricuspid valve is normal in structure and function. There is mild tricuspid regurgitation. Right


ventricular systolic pressure is normal.

Aortic Valve

There is mild aortic sclerosis.;. No aortic regurgitation is present.

Pulmonic Valve

The pulmonic valve is not well visualized.

Great Vessels

The aortic root is normal size.

Pericardium/Pleura

There is no pericardial effusion.



______________________________________________________________________________



Interpretation Summary

The left ventricle is normal in size.

Left ventricular systolic function is normal.

No regional wall motion abnormalities noted.

Ejection Fraction = >70%.

The right ventricular systolic function is normal.

The left atrial size is normal.

Right atrial size is normal.

There is mild mitral annular calcification.

There is trace to mild mitral regurgitation.

There is mild tricuspid regurgitation.

Right ventricular systolic pressure is normal.

There is mild aortic sclerosis.;

There is no pericardial effusion.



Previous study is not available for comparison





Jn Wellington MD 02/25/2019 04:06 PM

## 2019-02-25 NOTE — PN
Progress Note (short form)





- Note


Progress Note: 








64yo F with complicated medical history, including C4-5 osteomyelitis (now s/p 

fusion), and significant for rectal carcinoid with carcinoid syndrome, chronic 

diarrhea, managed with octreotide , who had severe diarrhea last week, 

developed acute abdominal pain  radiating toward LLQ and around to back, 

associated with an episode of N/V .





CT was done with no oral contrast showing very dilated stomach, stool in colon, 

possible dilated biliary ducts, R renal cyst, and no obstruction. She had been 

at [facility], and was started on IVF and antibiotics per ID, but this morning 

developed fever, chills, rigors and was transferred to [facility]. GI saw her today

, and has MRCP and echo pending. Surgery was asked to evaluate. 











- Past Medical History


Pulmonary: Yes: Other (recent URI (3wks ago, Z-willem finished around 2/10))


Gastrointestinal: Yes: Cancer (rectal carcinoid), GERD, Hiatal Hernia, Other (

large rectal carcinoid dx'ed , had RT & chemorx, has liver lesions, on 

octreotide, diarrhea managed with octreotide )


Reproductive: Yes: Postmenopausal


Infectious Disease: Yes: Other (Osteomyelitis of C4-C5)


Musculoskeletal: Yes: Osteoarthritis, Other (spinal stenosis, C4-C5 fusion 

after osteomyelitis)


Endocrine: Yes: Other (thyroid nodule)





- Past Surgical History


Past Surgical History: Yes: Arthrosocopy (left knee), Colonoscopy,  (x 

2), Tonsillectomy, Upper Endoscopy


Additional Surgical History: C4-C5 anterior discectomy and fusion for 

osteomyelitis .  right rotator cuff surgery.  Benign breast biopsies.  

Basal cell skin cancer excisions





- Alcohol/Substance Use


Hx Alcohol Use: Yes (wine 5 times a week)


History of Substance Use: reports: Marijuana (few times a month)





- Smoking History


Smoking history: Never smoked





- Social History


Usual Living Arrangement: With Spouse


ADL: Independent


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





- Allergies


Allergies/Adverse Reactions: 


 Allergies











Allergy/AdvReac Type Severity Reaction Status Date / Time


 


everolimus [From Afinitor] AdvReac Severe SOB, Verified 10/22/17 12:39





   Myalgias,  





   Arthralgias,Edema  


 


erythromycin base AdvReac   Verified 10/22/17 12:39





[Erythromycin Base]     














- Home Medications


Home Medications: 


Ambulatory Orders





Montelukast Na [Singulair -] 10 mg PO HS 17 


Topiramate [Topamax] 50 mg PO DAILY 17 


Lorazepam [Ativan] 2 mg PO HS 04/15/17 


Octreotide Acetate [Sandostatin] 30 mcg IJ MONTHLY  ampul 17 


Marj Griffin 3,000 Units Capsule  19 


Telotristat Etiprate [Xermelo] 250 mg PO DAILY 19 











Family Disease History





- Family Disease History


Family Disease History: CA: Mother (CLL,  81), Other: Father (alive 96)











Physical Exam


Vital Signs: 


 Vital Signs











Temperature  98.4 F   19 17:52


 


Pulse Rate  71   19 17:52


 


Respiratory Rate  18   19 17:52


 


Blood Pressure  133/63   19 17:52


 


O2 Sat by Pulse Oximetry (%)  96   19 08:11














Cor: RSR, No murmurs, No gallops


Lungs: Clear to P&A


Abd: Soft, Normal bowel sounds, No organomegaly


Ext:No significant edema

















Sodium  136 mmol/L (136-145)   19  07:00    


 


Potassium  4.1 mmol/L (3.5-5.1)   19  07:00    


 


Chloride  107 mmol/L ()   19  07:00    


 


Carbon Dioxide  20 mmol/L (21-32)  L  19  07:00    


 


Anion Gap  9 MMOL/L (8-16)   19  07:00    


 


BUN  6 mg/dl (7-18)  L  19  07:00    


 


Creatinine  0.6 mg/dl (0.55-1.3)   19  07:00    


 


Creat Clearance w eGFR  > 60  (>60)   19  07:00    


 


POC Glucometer  123 UNITS ()   19  16:04    


 


Random Glucose  132 mg/dl ()  H  19  07:00    


 


Lactic Acid  1.2 mmol/L (0.4-2.0)   19  07:00    


 


Calcium  8.4 mg/dl (8.5-10)  L  19  07:00    


 


Total Bilirubin  0.9 mg/dl (0.2-1)   19  07:00    


 


AST  202 U/L (15-37)  H  19  07:00    


 


ALT  184 U/L (13-61)  H  19  07:00    


 


Alkaline Phosphatase  123 U/L ()  H D 19  07:00    


 


Creatine Kinase  83 U/L ()   19  22:30    


 


Troponin I  0.02 ng/ml (0.00-0.05)   19  07:55    


 


Total Protein  5.7 g/dl (6.4-8.2)  L  19  07:00    


 


Albumin  3.1 g/dl (3.4-5.0)  L  19  07:00    


 


Lipase  103 U/L ()   19  22:30    








  





Imaging





- Results


Cat Scan: Report Reviewed, Image Reviewed (images reviewed - very dilated 

stomach, no enteral contrast, no clear transition or obstructive point, + stool 

in colon, more R than L, + R renal cyst, possibly mild biliary ductal dilation)


MRI: Pending (MRCP/MRI)








Assessment/Plan





64yo F with complicated medical history, including C4-5 osteomyelitis (now s/p 

fusion), and significant for rectal carcinoid with carcinoid syndrome, chronic 

diarrhea, managed with octreotide , who had severe diarrhea last week, 

developed acute abdominal pain  radiating toward LLQ and around to back, 

associated with an episode of N/V .





CT was done with no oral contrast showing very dilated stomach, stool in colon, 

possible dilated biliary ducts, R renal cyst, and no obstruction. She had been 

at [facility], and was started on IVF and antibiotics per ID, but this morning 

developed fever, chills, rigors and was transferred to [facility]. 





MRI abdomen shows cholelithiasis/choledocholithiasis/cholecystitis/pancreatitis





Being transferred to [facility] for ERCP 





On antibiotics





Carcinoid--rectal, s/p Xeloda/RT, s/p temodar, s/p eerolimus. Has been on 

octreotide for 8yrs. Followed by Dr. Jessee Green. Last octreotide 

dose was last week

## 2019-02-25 NOTE — PN
Progress Note, Physician


History of Present Illness: 





Pt had MRCP showing CBD stones and peripancreatic inflammation with probable 

cholecystitis. Still with some lower abdominal pain; had soft BM today. 

Tolerating clears. GI note reviewed - plan is for transfer for ERCP at 

[facility]. Pt seen and examined in bed, comfortable. Discussed likelihood that 

lap blanca will be offered to her after ERCP before she is discharged home, 

after ensuring no chemical pancreatitis after ERCP. 





- Current Medication List


Current Medications: 


Active Medications





Acetaminophen (Ofirmev Injection -)  1,000 mg IVPB Q6H PRN


   PRN Reason: PAIN OR FEVER


   Last Admin: 02/25/19 04:57 Dose:  1,000 mg


Al Hydroxide/Mg Hydroxide (Mylanta Oral Suspension -)  30 ml PO Q6H PRN


   PRN Reason: DYSPEPSIA


Heparin Sodium (Porcine) (Heparin -)  5,000 unit SQ TID [facility]


   Last Admin: 02/24/19 13:58 Dose:  5,000 unit


Piperacillin Sod/Tazobactam (Sod 3.375 gm/ Dextrose)  50 mls @ 100 mls/hr IVPB 

Q8H-IV RAJ; Protocol


   Last Admin: 02/25/19 17:38 Dose:  100 mls/hr


Sodium Chloride (Normal Saline -)  1,000 mls @ 100 mls/hr IV ASDIR RAJ


   Last Admin: 02/25/19 15:46 Dose:  100 mls/hr


Lorazepam (Ativan -)  2 mg PO HS [facility]


   Last Admin: 02/24/19 21:03 Dose:  2 mg


Montelukast Sodium (Singulair -)  10 mg PO HS [facility]


   Last Admin: 02/24/19 21:04 Dose:  10 mg


Morphine Sulfate (Morphine Sulfate)  2 mg IVPUSH Q4H PRN


   PRN Reason: PAIN LEVEL 7 - 10


   Last Admin: 02/25/19 10:13 Dose:  2 mg


Non-Formulary Medication (Octreotide Acetate [Sandostatin])  30 mcg IJ MONTHLY 

[facility]


Non-Formulary Medication (Telotristat Etiprate [Xermelo])  250 mg PO DAILY [facility]


Ondansetron HCl (Zofran Odt -)  4 mg SL Q6H PRN


   PRN Reason: NAUSEA AND/OR VOMITING


   Last Admin: 02/24/19 11:30 Dose:  4 mg


Polyethylene Glycol (Miralax (For Daily Use) -)  17 gm PO BID [facility]


   Last Admin: 02/25/19 10:14 Dose:  Not Given


Topiramate (Topamax -)  50 mg PO DAILY [facility]


   Last Admin: 02/25/19 13:13 Dose:  50 mg











- Objective


Vital Signs: 


 Vital Signs











Temperature  99.2 F   02/25/19 14:10


 


Pulse Rate  73   02/25/19 14:10


 


Respiratory Rate  20   02/25/19 14:10


 


Blood Pressure  144/75   02/25/19 14:10


 


O2 Sat by Pulse Oximetry (%)  98   02/25/19 09:00











Constitutional: Yes: Well Nourished, No Distress, Calm


Eyes: Yes: Conjunctiva Clear, EOM Intact.  No: Sclera Icterus


HENT: Yes: Atraumatic, Normocephalic


Gastrointestinal: Yes: Soft, Tenderness (epigastric and RUQ, RLQ without 

rebound or guarding).  No: Distention


Extremities: No: Cool, Cyanosis


Integumentary: No: Jaundice, Rash


Neurological: Yes: Alert, Oriented


Labs: 


 CBC, BMP





 02/25/19 10:12 





 02/25/19 06:45 





 INR, PTT











INR  1.10  (0.83-1.09)  H  02/25/19  06:45    








 CMP











Sodium  141 mmol/L (136-145)   02/25/19  06:45    


 


Potassium  3.8 mmol/L (3.5-5.1)   02/25/19  06:45    


 


Chloride  112 mmol/L ()  H  02/25/19  06:45    


 


Carbon Dioxide  22 mmol/L (21-32)   02/25/19  06:45    


 


Anion Gap  7 MMOL/L (8-16)  L  02/25/19  06:45    


 


BUN  8 mg/dL (7-18)   02/25/19  06:45    


 


Creatinine  0.6 mg/dL (0.55-1.3)   02/25/19  06:45    


 


Creat Clearance w eGFR  > 60  (>60)   02/25/19  06:45    


 


POC Glucometer  81 UNITS ()   02/25/19  11:16    


 


Random Glucose  109 mg/dL ()  H  02/25/19  06:45    


 


Hemoglobin A1c %  6.3 % (4.2-6.3)   02/25/19  10:12    


 


Lactic Acid  1.2 mmol/L (0.4-2.0)   02/24/19  07:00    


 


Calcium  7.9 mg/dL (8.5-10.1)  L  02/25/19  06:45    


 


Total Bilirubin  0.5 mg/dL (0.2-1)   02/25/19  06:45    


 


Direct Bilirubin  0.2 mg/dL (0.0-0.2)   02/25/19  06:45    


 


AST  135 U/L (15-37)  H  02/25/19  06:45    


 


ALT  237 U/L (13-61)  H  02/25/19  06:45    


 


Alkaline Phosphatase  172 U/L ()  H  02/25/19  06:45    


 


Creatine Kinase  83 U/L ()   02/22/19  22:30    


 


Troponin I  0.02 ng/ml (0.00-0.05)   02/23/19  07:55    


 


C-Reactive Protein  8.3 MG/DL (0.00-0.3)  H  02/25/19  06:45    


 


Total Protein  6.1 g/dl (6.4-8.2)  L  02/25/19  06:45    


 


Albumin  3.0 g/dl (3.4-5.0)  L  02/25/19  06:45    


 


Total Amylase  19 U/L ()  L  02/25/19  06:45    


 


Lipase  69 U/L ()  L  02/25/19  06:45    








 Microbiology











 02/22/19 22:45 Blood Culture - Preliminary





 Blood - Peripheral Venous    NO GROWTH OBTAINED AFTER 48 HOURS, INCUBATION TO 

CONTINUE





    FOR 3 DAYS.


 


 02/22/19 22:45 Blood Culture - Preliminary





 Blood - Peripheral Venous    NO GROWTH OBTAINED AFTER 48 HOURS, INCUBATION TO 

CONTINUE





    FOR 3 DAYS.














- ....Imaging


MRI: Report Reviewed (see hpi), Image Reviewed





Problem List





- Problems


(1) Calculus of gallbladder and bile duct w/cholecystitis w/o obstruction


Code(s): K80.60 - CALCULUS OF GB AND BILE DUCT W CHOLECYST, UNSP, W/O OBST   


Qualifiers: 


   Cholecystitis acuity: acute   Qualified Code(s): K80.62 - Calculus of 

gallbladder and bile duct with acute cholecystitis without obstruction   





(2) Epigastric pain


Code(s): R10.13 - EPIGASTRIC PAIN   





(3) Abnormal liver enzymes


Code(s): R74.8 - ABNORMAL LEVELS OF OTHER SERUM ENZYMES   





(4) Leukocytosis


Code(s): D72.829 - ELEVATED WHITE BLOOD CELL COUNT, UNSPECIFIED   


Qualifiers: 


   Leukocytosis type: other   Qualified Code(s): D72.828 - Other elevated white 

blood cell count   





(5) Dilation of stomach, acute


Code(s): K31.0 - ACUTE DILATATION OF STOMACH   





(6) Dilated bile duct


Code(s): K83.8 - OTHER SPECIFIED DISEASES OF BILIARY TRACT   





(7) Carcinoid syndrome


Code(s): E34.0 - CARCINOID SYNDROME   





(8) Carcinoid tumor, rectal, malignant


Code(s): C7A.026 - MALIGNANT CARCINOID TUMOR OF THE RECTUM   





Assessment/Plan





agree with GI plan for transfer to tertiary center


pt currently waiting for bed availability


pt aware that cholecystectomy is likely to be discussed/offered prior to 

discharge home from [facility],


after ERCP, which she believes is planned for tomorrow


will sign off


please recall with any change in plans or questions

## 2019-02-25 NOTE — DS
Physical Exam: 


SUBJECTIVE: Patient seen and examined








OBJECTIVE:


transfer to [facility] GI service.  Dr. Sena is accepting physician


 Vital Signs











 Period  Temp  Pulse  Resp  BP Sys/Bethea  Pulse Ox


 


 Last 24 Hr  98.3 F-99.2 F  73-75  18-20  128-144/70-75  98-98








PHYSICAL EXAM





GENERAL: The patient is awake, alert, and fully oriented, in no acute distress.


HEAD: Normal with no signs of trauma.


EYES: PERRL, extraocular movements intact, sclera anicteric, conjunctiva clear. 


ENT: Ears normal, nares patent, oropharynx clear without exudates, moist mucous 

membranes.


NECK: Trachea midline, full range of motion, supple. 


LUNGS: Breath sounds equal, clear to auscultation bilaterally, no wheezes, no 

crackles, no accessory muscle use. 


HEART: Regular rate and rhythm, S1, S2 without murmur, rub or gallop.


ABDOMEN: Soft, nontender, nondistended, normoactive bowel sounds, no guarding, 

no rebound, no hepatosplenomegaly, no masses.


EXTREMITIES: 2+ pulses, warm, well-perfused, no edema. 


NEUROLOGICAL: Cranial nerves II through XII grossly intact. Normal speech, gait 

not observed.


PSYCH: Normal mood, normal affect.


SKIN: Warm, dry, normal turgor, no rashes or lesions noted.





LABS


 Laboratory Results - last 24 hr











  02/24/19 02/25/19 02/25/19





  20:50 06:04 06:45


 


WBC    


 


RBC    


 


Hgb    


 


Hct    


 


MCV    


 


MCH    


 


MCHC    


 


RDW    


 


Plt Count    


 


MPV    


 


Absolute Neuts (auto)    


 


Neutrophils %    


 


Neutrophils % (Manual)    No Result Required.


 


Band Neutrophils %   


 


Lymphocytes %    


 


Lymphocytes % (Manual)   


 


Monocytes %    


 


Monocytes % (Manual)   


 


Eosinophils %    


 


Eosinophils % (Manual)   


 


Basophils %    


 


Basophils % (Manual)   


 


Myelocytes % (Man)   


 


Promyelocytes % (Man)   


 


Blast Cells % (Manual)   


 


Nucleated RBC %    0


 


Metamyelocytes   


 


Hypochromia   


 


Platelet Estimate   


 


Polychromasia   


 


Poikilocytosis   


 


Anisocytosis   


 


Microcytosis   


 


Macrocytosis   


 


PT with INR   


 


INR   


 


PTT (Actin FS)   


 


Sodium   


 


Potassium   


 


Chloride   


 


Carbon Dioxide   


 


Anion Gap   


 


BUN   


 


Creatinine   


 


Creat Clearance w eGFR   


 


POC Glucometer  111  130 


 


Random Glucose   


 


Hemoglobin A1c %   


 


Calcium   


 


Total Bilirubin   


 


Direct Bilirubin   


 


AST   


 


ALT   


 


Alkaline Phosphatase   


 


C-Reactive Protein   


 


Total Protein   


 


Albumin   


 


Total Amylase   


 


Lipase   


 


Blood Type   


 


Antibody Screen   














  02/25/19 02/25/19 02/25/19





  06:45 06:45 06:45


 


WBC   


 


RBC   


 


Hgb   


 


Hct   


 


MCV   


 


MCH   


 


MCHC   


 


RDW   


 


Plt Count   


 


MPV   


 


Absolute Neuts (auto)   


 


Neutrophils %   


 


Neutrophils % (Manual)   


 


Band Neutrophils %   


 


Lymphocytes %   


 


Lymphocytes % (Manual)   


 


Monocytes %   


 


Monocytes % (Manual)   


 


Eosinophils %   


 


Eosinophils % (Manual)   


 


Basophils %   


 


Basophils % (Manual)   


 


Myelocytes % (Man)   


 


Promyelocytes % (Man)   


 


Blast Cells % (Manual)   


 


Nucleated RBC %   


 


Metamyelocytes   


 


Hypochromia   


 


Platelet Estimate   


 


Polychromasia   


 


Poikilocytosis   


 


Anisocytosis   


 


Microcytosis   


 


Macrocytosis   


 


PT with INR   13.00 


 


INR   1.10 H 


 


PTT (Actin FS)   20.0 L 


 


Sodium  141  


 


Potassium  3.8  


 


Chloride  112 H  


 


Carbon Dioxide  22  


 


Anion Gap  7 L  


 


BUN  8  


 


Creatinine  0.6  


 


Creat Clearance w eGFR  > 60  


 


POC Glucometer   


 


Random Glucose  109 H  


 


Hemoglobin A1c %   


 


Calcium  7.9 L  


 


Total Bilirubin  0.5  


 


Direct Bilirubin  0.2  


 


AST  135 H  


 


ALT  237 H  


 


Alkaline Phosphatase  172 H  


 


C-Reactive Protein  8.3 H  


 


Total Protein  6.1 L  


 


Albumin  3.0 L  


 


Total Amylase  19 L  


 


Lipase  69 L  


 


Blood Type    A POSITIVE


 


Antibody Screen    Negative














  02/25/19 02/25/19 02/25/19





  06:47 10:12 10:12


 


WBC   26.4 H 


 


RBC   4.32 


 


Hgb   13.5 


 


Hct   40.2 


 


MCV   92.9 


 


MCH   31.3 


 


MCHC   33.7 


 


RDW   17.3 H 


 


Plt Count   443 H D 


 


MPV   7.9 


 


Absolute Neuts (auto)   24.4 H 


 


Neutrophils %   92.4 H 


 


Neutrophils % (Manual)   96.0 H 


 


Band Neutrophils %   0.0 


 


Lymphocytes %   4.7 L 


 


Lymphocytes % (Manual)   3.0 L 


 


Monocytes %   0.5 L 


 


Monocytes % (Manual)   1 L D 


 


Eosinophils %   0.9  D 


 


Eosinophils % (Manual)   0.0  D 


 


Basophils %   1.5 


 


Basophils % (Manual)   0.0 


 


Myelocytes % (Man)   0 


 


Promyelocytes % (Man)   0 


 


Blast Cells % (Manual)   0 


 


Nucleated RBC %   0 


 


Metamyelocytes   0 


 


Hypochromia   0 


 


Platelet Estimate   Normal 


 


Polychromasia   0 


 


Poikilocytosis   0 


 


Anisocytosis   1+ 


 


Microcytosis   0 


 


Macrocytosis   1+ 


 


PT with INR   


 


INR   


 


PTT (Actin FS)   


 


Sodium   


 


Potassium   


 


Chloride   


 


Carbon Dioxide   


 


Anion Gap   


 


BUN   


 


Creatinine   


 


Creat Clearance w eGFR   


 


POC Glucometer   


 


Random Glucose   


 


Hemoglobin A1c %  Cancelled   6.3


 


Calcium   


 


Total Bilirubin   


 


Direct Bilirubin   


 


AST   


 


ALT   


 


Alkaline Phosphatase   


 


C-Reactive Protein   


 


Total Protein   


 


Albumin   


 


Total Amylase   


 


Lipase   


 


Blood Type   


 


Antibody Screen   














  02/25/19





  11:16


 


WBC 


 


RBC 


 


Hgb 


 


Hct 


 


MCV 


 


MCH 


 


MCHC 


 


RDW 


 


Plt Count 


 


MPV 


 


Absolute Neuts (auto) 


 


Neutrophils % 


 


Neutrophils % (Manual) 


 


Band Neutrophils % 


 


Lymphocytes % 


 


Lymphocytes % (Manual) 


 


Monocytes % 


 


Monocytes % (Manual) 


 


Eosinophils % 


 


Eosinophils % (Manual) 


 


Basophils % 


 


Basophils % (Manual) 


 


Myelocytes % (Man) 


 


Promyelocytes % (Man) 


 


Blast Cells % (Manual) 


 


Nucleated RBC % 


 


Metamyelocytes 


 


Hypochromia 


 


Platelet Estimate 


 


Polychromasia 


 


Poikilocytosis 


 


Anisocytosis 


 


Microcytosis 


 


Macrocytosis 


 


PT with INR 


 


INR 


 


PTT (Actin FS) 


 


Sodium 


 


Potassium 


 


Chloride 


 


Carbon Dioxide 


 


Anion Gap 


 


BUN 


 


Creatinine 


 


Creat Clearance w eGFR 


 


POC Glucometer  81


 


Random Glucose 


 


Hemoglobin A1c % 


 


Calcium 


 


Total Bilirubin 


 


Direct Bilirubin 


 


AST 


 


ALT 


 


Alkaline Phosphatase 


 


C-Reactive Protein 


 


Total Protein 


 


Albumin 


 


Total Amylase 


 


Lipase 


 


Blood Type 


 


Antibody Screen 











HOSPITAL COURSE:





Date of Admission:02/24/19





Date of Discharge: 02/25/19














Discharge Summary


Reason For Visit: ABDOMINAL PAIN/LEUKOCYTOSIS


Current Active Problems





Abdominal pain (Acute)


Abnormal liver enzymes (Acute)


Acid reflux disease (Acute)


Calculus of gallbladder and bile duct w/cholecystitis w/o obstruction (Acute)


Carcinoid syndrome (Acute)


Carcinoid tumor, rectal, malignant (Acute)


Chronic diarrhea (Acute)


Constipation (Acute)


Dilated bile duct (Acute)


Dilation of stomach, acute (Acute)


Epigastric pain (Acute)


Leukocytosis (Acute)


Pre-diabetes (Acute)


Rectal carcinoid tumor (Acute)


SIRS (systemic inflammatory response syndrome) (Acute)








Condition: Guarded





- Instructions


Diet, Activity, Other Instructions: 


transfer to [facility] GI service


Referrals: 


Kingston Kruger MD [Primary Care Provider] - 


Disposition: TRANSFER ACUTE CARE/OTHER HOSP





- Home Medications


Comprehensive Discharge Medication List: 


Ambulatory Orders





Montelukast Na [Singulair -] 10 mg PO HS 03/27/17 


Topiramate [Topamax] 50 mg PO DAILY 03/27/17 


Lorazepam [Ativan] 2 mg PO HS 04/15/17 


Octreotide Acetate [Sandostatin] 30 mcg IJ MONTHLY  ampul 08/31/17 


Marj Griffin 3,000 Units Capsule  02/22/19 


Telotristat Etiprate [Xermelo] 250 mg PO DAILY 02/22/19

## 2019-03-10 ENCOUNTER — HOSPITAL ENCOUNTER (EMERGENCY)
Dept: HOSPITAL 74 - FER | Age: 64
Discharge: HOME | End: 2019-03-10
Payer: COMMERCIAL

## 2019-03-10 VITALS — BODY MASS INDEX: 25 KG/M2

## 2019-03-10 VITALS — TEMPERATURE: 97.8 F | DIASTOLIC BLOOD PRESSURE: 70 MMHG | SYSTOLIC BLOOD PRESSURE: 112 MMHG | HEART RATE: 82 BPM

## 2019-03-10 DIAGNOSIS — N39.0: ICD-10-CM

## 2019-03-10 DIAGNOSIS — K21.9: ICD-10-CM

## 2019-03-10 DIAGNOSIS — Z85.048: ICD-10-CM

## 2019-03-10 DIAGNOSIS — M54.5: Primary | ICD-10-CM

## 2019-03-10 LAB
EPITH CASTS URNS QL MICRO: (no result) /HPF
LEUKOCYTE ESTERASE UR QL STRIP.AUTO: (no result)
PH UR: 6 [PH] (ref 4.5–8)
RBC # BLD AUTO: (no result) /HPF (ref 0–3)
SP GR UR: 1.01 (ref 1.01–1.03)
UROBILINOGEN UR STRIP-MCNC: 0.2 MG/DL (ref 0.2–1)
WBC # UR AUTO: (no result) /UL (ref 0–5)

## 2019-03-10 PROCEDURE — 3E0233Z INTRODUCTION OF ANTI-INFLAMMATORY INTO MUSCLE, PERCUTANEOUS APPROACH: ICD-10-PCS | Performed by: EMERGENCY MEDICINE

## 2019-03-10 NOTE — PDOC
History of Present Illness





- General


Chief Complaint: Pain


Stated Complaint: RIGHT BACK/FLANK PAIN


History Source: Patient


Exam Limitations: No Limitations





- History of Present Illness


Initial Comments: 





03/10/19 13:13


62 yo F h/o recent laprascopic cholecystectomy 2 weeks ago at [facility] here 

with c/o right flank pain. pt h/o cervical spine discitis/ osteo, prior surgery

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  pt states upper back pain it started 

few days ago. is worse with walking, turning and lying on her side. states has 

noted difference in her urine stream, goes and then stops. no dysuria. no f/c 

no n/v has been eating ok. no other complaints. no  new leg weakness or 

numbness. no mod factors. no ho prior back injury denies trauma. 


03/10/19 14:44








Past History





- Past Medical History


Allergies/Adverse Reactions: 


 Allergies











Allergy/AdvReac Type Severity Reaction Status Date / Time


 


everolimus [From Afinitor] AdvReac Severe SOB, Verified 10/22/17 12:39





   Myalgias,  





   Arthralgias,Edema  


 


erythromycin base AdvReac   Verified 10/22/17 12:39





[Erythromycin Base]     











Home Medications: 


Ambulatory Orders





Montelukast Na [Singulair -] 10 mg PO HS 03/27/17 


Topiramate [Topamax] 50 mg PO DAILY 03/27/17 


Lorazepam [Ativan] 2 mg PO HS 04/15/17 


Octreotide Acetate [Sandostatin] 30 mcg IJ MONTHLY  ampul 08/31/17 


Telotristat Etiprate [Xermelo] 250 mg PO DAILY 02/22/19 


Diazepam [Valium] 5 mg PO Q8H PRN #15 tablet MDD 3 03/10/19 


Fluticasone Prop 0.05% Nasal [Flonase -] 1 - 2 spray NS BID 03/10/19 


Ibuprofen [Motrin -] 600 mg PO TID PRN #90 tablet 03/10/19 


Lipase/Protease/Amylase [Creon Dr 24,000 Units Capsule] 4 each PO AC 03/10/19 


Omeprazole 20 mg PO HS 03/10/19 


Oxycodone HCl 5 mg PO QID PRN 03/10/19 


Oxycodone HCl/Acetaminophen [Percocet 5-325 mg Tablet] 1 - 2 tab PO Q6H PRN #15 

tablet MDD 6 03/10/19 


Sulfamethoxazole/Trimethoprim [Bactrim Ds -] 1 tab PO BID #14 tablet 03/10/19 








Cancer: Yes (RECTAL)


COPD: No


GI Disorders: Yes (GERD)





- Surgical History


Cholecystectomy: Yes (2/28/19)


Gastric Stapling: Yes





- Suicide/Smoking/Psychosocial Hx


Smoking History: Never smoked


Have you smoked in the past 12 months: No


Hx Alcohol Use: No


Drug/Substance Use Hx: No


Substance Use Type: None





**Review of Systems





- Review of Systems


Constitutional: No: Chills, Diaphoresis


HEENTM: No: Eye Pain


Respiratory: No: Cough, Orthopnea


Cardiac (ROS): No: Chest Pain


: Yes: Other (see hpi).  No: Burning, Dysuria


Musculoskeletal: Yes: Back Pain


Integumentary: No: Bruising, Change in Color


Neurological: No: Headache, Numbness, Tingling, Tremors


All Other Systems: Reviewed and Negative





*Physical Exam





- Vital Signs


 Last Vital Signs











Temp Pulse Resp BP Pulse Ox


 


 97.8 F   82   16   112/70   100 


 


 03/10/19 12:36  03/10/19 12:36  03/10/19 12:36  03/10/19 12:36  03/10/19 12:36














- Physical Exam


Comments: 





03/10/19 13:16


awake alert lungs clear bilaterally heart rr no mrg abd soft nt incisions CDI. 

right cva ttp. no rebound no guarding. no midline thoracic or lumba spinal ttp. 

lower ext 5/5 strength, sensation intact throughout. skin warm and dry incision 

as described. nuero intact. 





Moderate Sedation





- Procedure Monitoring


Vital Signs: 


Procedure Monitoring Vital Signs











Temperature  97.8 F   03/10/19 12:36


 


Pulse Rate  82   03/10/19 12:36


 


Respiratory Rate  16   03/10/19 12:36


 


Blood Pressure  112/70   03/10/19 12:36


 


O2 Sat by Pulse Oximetry (%)  100   03/10/19 12:36











Medical Decision Making





- Medical Decision Making





03/10/19 13:17


62 yo F s/p lap blanca here with c/o right flank pain , worse wtih movement. no 

nuero compromise. no f/c no abd pain.


exam with lumbosacral ttp, no midline tenderness and nuero intact.


differential msk strain, uti/ pyelo, no abd tenderness to suggest operative 

complication such as abcess , leak or infection.


plan ua toradol, muscle relaxers. reassess. 


03/10/19 14:45


pt pain unimproved with meds, will obtain ct a/p r/o stone, ro other pathology 

post op.  pt has mild uti wtih blood in urine. 


03/10/19 15:39


pt ct wtih no renal stone, no obvious fluid collection. still has pneumobilia 

in central hepatic duct [facility] postsurgical. will call pt surgeon dr pilo sanchez. she has folllowup with him on 3/12/19 n 48 hrs.


pt told to return fo n/v fever or abd pain. given rx for bactrim for mild uit. 

dc with rx for percocet, valium and motrin. 





*DC/Admit/Observation/Transfer


Diagnosis at time of Disposition: 


 Low back strain, UTI (urinary tract infection)








- Discharge Dispostion


Disposition: HOME


Condition at time of disposition: Improved





- Prescriptions


Prescriptions: 


Diazepam [Valium] 5 mg PO Q8H PRN #15 tablet MDD 3


 PRN Reason: Back Pain


Ibuprofen [Motrin -] 600 mg PO TID PRN #90 tablet


 PRN Reason: Pain


Oxycodone HCl/Acetaminophen [Percocet 5-325 mg Tablet] 1 - 2 tab PO Q6H PRN #15 

tablet MDD 6


 PRN Reason: Pain


Sulfamethoxazole/Trimethoprim [Bactrim Ds -] 1 tab PO BID #14 tablet





- Referrals


Referrals: 


Kingston Kruger MD [Primary Care Provider] - 





- Patient Instructions


Printed Discharge Instructions:  Back Pain (Alternative Therapy), Urinary Tract 

Infection


Additional Instructions: 


you have a mild UTI. you should take bactrim twice dailly x 7 days. you can 

take motrin 600 mg every 8 hrs as needed for your pain. take with food. you can 

also take percocet one every 6 hrs as needed for pain. you should understand 

this is a controlled substance, and is addictive. should only be used if pain 

is not relieved by motrin. you should take a stool softner while on this 

medication as it causes constipation. do not drive after taking medication. in 

addition you can take valium 5 mg every 8 hrs as needed for back pain and 

muscle spasm. this is also addictive and is a controlled substance. do not mix 

with alcohol, or drive after taking medication. you should space out medication 

with percocet by 1 - 2 hours as both can be sedating. follow up with your doctor

, call to schedule. you should also follow up with dr Niels francis as 

scheduled for 3/12. return for vomiting, fever worsening pain, weakness 

numbness or any concerns. 





- Post Discharge Activity

## 2019-08-22 NOTE — PN
"REHAB FOLLOWUP NOTE    Deonna Montero is a 77 y.o. female patient.    Chief Complaint:  Status post left pontine and occipital ischemic infarct with a dysarthria, balance coordination problems.    History:  77-year-old  right-hand-dominant female was initially admitted to [facility] with left-sided CVA with right hemiparesis on 07/23/2019 was hospitalized till 07/26/2019 for neuro workup including CT scan showed no acute changes, carotids without any significant stenosis, MRI consistent with left pontine as well as occipital ischemic infarcts.  Patient has been medically stabilized and transferred to swing bed with steady improvement and is now transferred down to us for further rehabilitation.    Past Medical History:   Diagnosis Date    Back pain     Diabetes mellitus     Hypertension     Reflux     Short-term memory loss     Stroke      Temp: 96.3 °F (35.7 °C) (08/22/19 0727)  Pulse: 68 (08/22/19 0727)  Resp: 16 (08/22/19 0727)  BP: (!) 145/66 (08/22/19 0727)  SpO2: (!) 94 % (08/22/19 0727)  Weight: 96.2 kg (212 lb 1.3 oz) (08/15/19 1334)  Height: 5' 8" (172.7 cm) (08/15/19 1339)    Lab Results   Component Value Date    WBC 3.62 (L) 08/21/2019    HGB 13.8 08/21/2019    HCT 42.8 08/21/2019     08/21/2019    ALT 35 08/21/2019    AST 32 08/21/2019     08/21/2019    K 4.7 08/21/2019     08/21/2019    CREATININE 1.0 08/21/2019    BUN 22 08/21/2019    CO2 27 08/21/2019    INR 1.1 04/19/2018    HGBA1C 8.9 (H) 08/15/2019     Physical Examination:  Alert, oriented x3, follows commands well.  Feels much better today.  Had a bad day yesterday with aching all over, nausea, vomiting.  Vital signs were stable.  Labs reviewed.  Lungs are clear to auscultation.  Heart with regular rate and rhythm.  Extremities without any edema or calf tenderness noted.  Blood sugars elevated adjust insulin does.  Blood pressure stable.     Impression:  Status post left-sided CVA with right " Progress Note, Physician


Chief Complaint: 


neck pain





History of Present Illness: 


Pain in neck since early march with progression, than admission for sepsis, 

UTI.  Upon discharge saw Dr. Olivera who felt that she had brachial neuritis or 

possibly cevical radiculopathy based on EMG findings, ordered MRI.  She had 

good results from steroid treatment, but MRI showed C4-5 discitis osteomyelitis 

when it was finally done 2 days ago, a surprise finding.  I was called and 

arranged the current admission emergently.  She has had progression of pain and 

some weakenss on the right side, though its not clear if that has progressed as 

well.  








- Current Medication List


Current Medications: 


Active Medications





Potassium Chloride/Sodium Chloride (1/2ns+20meq Kcl)  1,000 mls @ 75 mls/hr IV 

ASDIR [facility]


   Last Admin: 04/16/17 10:09 Dose:  75 mls/hr


Montelukast Sodium (Singulair -)  10 mg PO HS [facility]


   Last Admin: 04/15/17 23:28 Dose:  10 mg


Topiramate (Topamax -)  50 mg PO DAILY [facility]


   Last Admin: 04/16/17 09:54 Dose:  50 mg


Tramadol HCl (Ultram -)  50 mg PO Q8H PRN


   PRN Reason: PAIN


   Last Admin: 04/16/17 10:43 Dose:  50 mg











- Objective


Vital Signs: 


 Vital Signs











Temperature  98.2 F   04/16/17 06:00


 


Pulse Rate  72   04/16/17 10:00


 


Respiratory Rate  18   04/16/17 10:00


 


Blood Pressure  116/80   04/16/17 10:00


 


O2 Sat by Pulse Oximetry (%)  98   04/16/17 09:00











Neurological: Yes: Alert, Oriented


...Motor Strength: LUE (5/5), LLE (5/5), RUE (4/5 deltoids, triceps), RLE (5/5)


Psychiatric: Yes: Alert, Oriented, Other (calm, and appropriately adjusting to 

the situation)


Labs: 


 CBC, BMP





 04/16/17 07:30 





 04/16/17 07:30 





 INR, PTT











INR  1.01  (0.82-1.09)   04/15/17  12:49    














- ....Imaging


MRI: Report Reviewed, Image Reviewed (c4-5 osteomyelitis, discitis)





Problem List





- Problems


(1) Osteomyelitis


Assessment/Plan: 


Osteomyelitis, discitis, with associated weakness in the right deltoid and 

triceps muscles, not accounted for by pain.  Will need biopsy of area, if not 

debulking followed by IV antibiotics.  


Plan is for surgery tomorrow.  Identification of organism.  I would also ask ID 

if given that this started in early March, most likely, and that she 

subsequently also developed a UTI and sepsis, could there be another source of 

seeding in the body that we need to look for?





Code(s): M86.9 - OSTEOMYELITIS, UNSPECIFIED   Qualifiers: 


     Osteomyelitis type: subacute     Osteomyelitis location: other site        

Qualified Code(s): M86.28 - Subacute osteomyelitis, other site hemiparesis.  Hypertension.  Insulin-dependent diabetes mellitus.  Hyperlipidemia.  Degenerative joint disease.  Left eye enucleation.    Recommendations:  Continue current PT, OT, speech therapy and nursing care.          Nicky Diamond MD  8/22/2019

## 2019-09-30 ENCOUNTER — HOSPITAL ENCOUNTER (INPATIENT)
Dept: HOSPITAL 74 - FER | Age: 64
LOS: 7 days | Discharge: HOME | DRG: 814 | End: 2019-10-07
Attending: NURSE PRACTITIONER | Admitting: INTERNAL MEDICINE
Payer: COMMERCIAL

## 2019-09-30 VITALS — BODY MASS INDEX: 26.4 KG/M2

## 2019-09-30 DIAGNOSIS — K21.9: ICD-10-CM

## 2019-09-30 DIAGNOSIS — M48.02: ICD-10-CM

## 2019-09-30 DIAGNOSIS — D47.1: ICD-10-CM

## 2019-09-30 DIAGNOSIS — J90: ICD-10-CM

## 2019-09-30 DIAGNOSIS — R74.0: ICD-10-CM

## 2019-09-30 DIAGNOSIS — G43.909: ICD-10-CM

## 2019-09-30 DIAGNOSIS — D72.829: ICD-10-CM

## 2019-09-30 DIAGNOSIS — D3A.026: ICD-10-CM

## 2019-09-30 DIAGNOSIS — F41.9: ICD-10-CM

## 2019-09-30 DIAGNOSIS — R16.1: Primary | ICD-10-CM

## 2019-09-30 DIAGNOSIS — J18.9: ICD-10-CM

## 2019-09-30 LAB
ALBUMIN SERPL-MCNC: 3.6 G/DL (ref 3.4–5)
ALP SERPL-CCNC: 431 U/L (ref 45–117)
ALT SERPL-CCNC: 19 U/L (ref 13–61)
ANION GAP SERPL CALC-SCNC: 9 MMOL/L (ref 8–16)
ANISOCYTOSIS BLD QL: (no result)
AST SERPL-CCNC: 24 U/L (ref 15–37)
BILIRUB SERPL-MCNC: 0.7 MG/DL (ref 0.2–1)
BUN SERPL-MCNC: 13 MG/DL (ref 7–18)
CALCIUM SERPL-MCNC: 9 MG/DL (ref 8.5–10)
CAOX CRY URNS QL MICRO: (no result) /HPF
CHLORIDE SERPL-SCNC: 106 MMOL/L (ref 98–107)
CO2 SERPL-SCNC: 25 MMOL/L (ref 21–32)
CREAT SERPL-MCNC: 0.9 MG/DL (ref 0.55–1.3)
DEPRECATED RDW RBC AUTO: 20.6 % (ref 11.6–15.6)
EPITH CASTS URNS QL MICRO: (no result) /HPF
GLUCOSE SERPL-MCNC: 87 MG/DL (ref 74–106)
HCT VFR BLD CALC: 36.8 % (ref 32.4–45.2)
HGB BLD-MCNC: 12.3 GM/DL (ref 10.7–15.3)
HYALINE CASTS URNS QL MICRO: (no result) /LPF
LIPASE SERPL-CCNC: 78 U/L (ref 73–393)
MACROCYTES BLD QL: (no result)
MAGNESIUM SERPL-MCNC: 1.9 MG/DL (ref 1.8–2.4)
MCH RBC QN AUTO: 33.5 PG (ref 25.7–33.7)
MCHC RBC AUTO-ENTMCNC: 33.5 G/DL (ref 32–36)
MCV RBC: 100.1 FL (ref 80–96)
MUCOUS THREADS URNS QL MICRO: (no result)
PLATELET # BLD AUTO: 241 K/MM3 (ref 134–434)
PMV BLD: 7.4 FL (ref 7.5–11.1)
POTASSIUM SERPLBLD-SCNC: 3.9 MMOL/L (ref 3.5–5.1)
PROT SERPL-MCNC: 6.6 G/DL (ref 6.4–8.2)
RBC # BLD AUTO: 3.68 M/MM3 (ref 3.6–5.2)
RBC MORPH BLD: YES
SODIUM SERPL-SCNC: 140 MMOL/L (ref 136–145)
URATE CRY URNS QL MICRO: (no result) /HPF
WBC # BLD AUTO: 105 K/MM3 (ref 4–10.8)

## 2019-09-30 PROCEDURE — A9579 GAD-BASE MR CONTRAST NOS,1ML: HCPCS

## 2019-09-30 PROCEDURE — C1887 CATHETER, GUIDING: HCPCS

## 2019-09-30 RX ADMIN — MONTELUKAST SODIUM SCH MG: 10 TABLET, COATED ORAL at 21:27

## 2019-09-30 RX ADMIN — PANTOPRAZOLE SODIUM SCH MG: 20 TABLET, DELAYED RELEASE ORAL at 21:27

## 2019-09-30 RX ADMIN — MORPHINE SULFATE PRN MG: 2 INJECTION, SOLUTION INTRAMUSCULAR; INTRAVENOUS at 23:33

## 2019-09-30 RX ADMIN — HEPARIN SODIUM SCH: 5000 INJECTION, SOLUTION INTRAVENOUS; SUBCUTANEOUS at 21:43

## 2019-09-30 RX ADMIN — ACETAMINOPHEN SCH MG: 10 INJECTION, SOLUTION INTRAVENOUS at 23:17

## 2019-09-30 NOTE — PDOC
History of Present Illness





- General


Chief Complaint: Pain, Acute


Stated Complaint: LEFT UPPER ABD PAIN


Time Seen by Provider: 09/30/19 13:00


History Source: Patient


Exam Limitations: No Limitations





- History of Present Illness


Initial Comments: 





09/30/19 13:52


62y/o F h/o GERD, lap blanca last year, rectal carcinoid on monthly treatment (

[facility]), leukocytosis of unclear etiology maintained on hydrea ([facility]) 

presents for evaluation of worsening L rib/flank pain since yesterday. Pt was 

in her usonh, last night developed gradual onset mild L lower flank/rib pain, 

did not take anything and slept throughout the night but awoke this morning 

with increased pain, now positional and worse with deep inspiration. radiates 

to L shoulder but no cough/f/c, no GI/ complaints. Has been balancing diet 

due to gluten allergy, has h/o non-bloody diarrhea but now normal, last BM 2d 

ago. Has normal appetite and ate breakfast this morning without change in her 

pain. Did not take pain meds, presents for evaluation. 





No rash/injury. no cough/f/c/palpitations. 


baseline b/l leg edema. 


no h/o kidney stones/pneumonia.








Past History





- Past Medical History


Allergies/Adverse Reactions: 


 Allergies











Allergy/AdvReac Type Severity Reaction Status Date / Time


 


everolimus [From Afinitor] AdvReac Severe SOB, Verified 09/30/19 14:40





   Myalgias,  





   Arthralgias,Edema  


 


erythromycin base AdvReac   Verified 09/30/19 14:40





[Erythromycin Base]     











Home Medications: 


Ambulatory Orders





Montelukast Na [Singulair -] 10 mg PO HS 03/27/17 


Topiramate [Topamax] 50 mg PO DAILY 03/27/17 


Lorazepam [Ativan] 2 mg PO HS 04/15/17 


Octreotide Acetate [Sandostatin] 30 mcg IJ MONTHLY  ampul 08/31/17 


Telotristat Etiprate [Xermelo] 250 mg PO DAILY 02/22/19 


Lipase/Protease/Amylase [Creon Dr 24,000 Units Capsule] 4 each PO TID 03/10/19 


Omeprazole 20 mg PO HS 03/10/19 


Hydroxyurea [Hydrea -] 500 mg PO DAILY 09/30/19 








Cancer: Yes (RECTAL CARCINOID)


COPD: No


GI Disorders: Yes (GERD)





- Surgical History


Cholecystectomy: Yes (2/28/19)


Gastric Stapling: Yes





- Psycho Social/Smoking Cessation Hx


Smoking History: Never smoked


Have you smoked in the past 12 months: No


Information on smoking cessation initiated: No


Hx Alcohol Use: No


Drug/Substance Use Hx: No


Substance Use Type: None





**Review of Systems





- Review of Systems


Constitutional: No: Chills, Fever, Night Sweats, Unexplained wgt Loss


Respiratory: No: Cough, Shortness of Breath


Cardiac (ROS): Yes: See HPI, Edema (baseline chronic).  No: Palpitations


ABD/GI: Yes: See HPI.  No: Constipated, Diarrhea, Vomiting


: No: Burning, Dysuria, Frequency, Hematuria


Musculoskeletal: Yes: See HPI


Integumentary: No: Bruising, Rash


Neurological: No: Headache


All Other Systems: Reviewed and Negative





*Physical Exam





- Vital Signs


 Last Vital Signs











Temp Pulse Resp BP Pulse Ox


 


 98 F   94 H  18   131/79   98 


 


 09/30/19 13:00  09/30/19 13:00  09/30/19 13:00  09/30/19 13:00  09/30/19 13:00














- Physical Exam


Comments: 





09/30/19 13:58


Vital signs as noted and within normal limits, O2 sat 98% on room air


GENERAL: The patient is awake, alert, and fully oriented, in mild to moderate 

distress with deep inspiration or positional changes.


HEAD: Normal with no signs of trauma.


EYES: PERRL, EOMI, sclera anicteric, conjunctiva clear with no pallor.


ENT: oropharynx clear without exudates.  Moist mucous membranes.


NECK: Normal range of motion, supple without lymphadenopathy, JVD, or masses.


CHEST: exquisite reproducible tenderness pinpoint in the L posterior 8/10 

intercostal spaces, less tender and without deformity over ribs. No crepitus. 

No rash, no bruising. 


LUNGS: Breath sounds equal, splints to L base but clear to auscultation 

bilaterally.  No wheeze/crackles.


HEART: Regular rate and rhythm, normal S1 and S2 without murmur or rub.


ABDOMEN: Soft/nontender/nondistended. BS wnl.  No guarding or rebound.  No 

palpable masses. No palpable hepatosplenomegaly. No CVAT. Healed incisional 

scars without palpable hernia. 


EXTREMITIES: Normal range of motion, 2-3+ edema b/l ankles. 2+ distal pulses. 

No cords, erythema, or tenderness.


NEUROLOGICAL: Cranial nerves II through XII grossly intact.  Normal speech, 

normal gait.


PSYCH: Normal mood, normal affect.


SKIN: Warm, Dry, no rashes or lesions noted.








**Heart Score/ECG Review


  ** #1


ECG reviewed & interpreted by me at: 13:13


General ECG Interpretation: Sinus Rhythm, Normal Rate (90), Normal Intervals (

qtc 425), No acute ischemic changes





ED Treatment Course





- LABORATORY


CBC & Chemistry Diagram: 


 09/30/19 13:45





 09/30/19 14:20





- RADIOLOGY


Radiology Studies Ordered: 














 Category Date Time Status


 


 CHEST X-RAY PORTABLE* [RAD] [facility]iology  09/30/19 13:43 Ordered














Medical Decision Making





- Medical Decision Making





09/30/19 14:02


62y/o F rectal carcinoid, leukocytosis p/w gradual L intercostal/flank 

discomfort, HD stable here without cardiopulmonary distress, focal reproducible 

ttp over L posterior chest wall intercostal space. Clinically not consistent 

with acute GI etiology, ? splenic process given her unknown heme issue, ? 

explained by renal colic. no rash to suggest shingles. r/o pna/pleural effusion.


labs, ua


cxr, ekg


likely ctap


pain control, trial toradol (pt able to take nsaids)


reassess





09/30/19 15:05


wbc 105k, double her previous baseline in the 40s and concerning for 

hyperleukocytosis. No evidence of leukostasis given no neuro complaints, no 

hypoxia. chem/ua otherwise unremarkable (even rbc/wbc on ua). feels better 

after toradol, plts normal. 


ctap pending, possible splenic involvement. will d/w Heme pending results. 





09/30/19 17:29


case d/w Dr. Codie Ulloa at [facility], recommends hydrea 1000mg q12h x 4-5 

doses, iv hydration and pain management, then f/u in office. CTAP pending, but 

no evidence of splenic hemorrhage on my prelim review. 


Will admit to DF, accepted for inpt med/surg by Dr. Braswell, signout given to 

JULIO CESAR Vasquez. 





09/30/19 18:37


CTAP with homogenously enlarged spleen but no infarct/hemorrhage. clinically 

unchanged and stable, IVF started and hydrea ordered per heme recommendations. 

pt proceeded to inpatient bed. 





Discharge





- Discharge Information


Problems reviewed: Yes


Clinical Impression/Diagnosis: 


 Left flank pain, Hyperleukocytosis





Condition: Guarded





- Admission


Yes





- Follow up/Referral





- Patient Discharge Instructions





- Post Discharge Activity

## 2019-10-01 LAB
ALBUMIN SERPL-MCNC: 3 G/DL (ref 3.4–5)
ALP SERPL-CCNC: 412 U/L (ref 45–117)
ALT SERPL-CCNC: 27 U/L (ref 13–61)
ANION GAP SERPL CALC-SCNC: 2 MMOL/L (ref 8–16)
AST SERPL-CCNC: 37 U/L (ref 15–37)
BASOPHILS # BLD: 0.1 % (ref 0–2)
BILIRUB SERPL-MCNC: 0.5 MG/DL (ref 0.2–1)
BUN SERPL-MCNC: 13 MG/DL (ref 7–18)
CALCIUM SERPL-MCNC: 8 MG/DL (ref 8.5–10)
CHLORIDE SERPL-SCNC: 112 MMOL/L (ref 98–107)
CO2 SERPL-SCNC: 25 MMOL/L (ref 21–32)
CREAT SERPL-MCNC: 0.8 MG/DL (ref 0.55–1.3)
DEPRECATED RDW RBC AUTO: 19.8 % (ref 11.6–15.6)
EOSINOPHIL # BLD: 0.9 % (ref 0–4.5)
GLUCOSE SERPL-MCNC: 155 MG/DL (ref 74–106)
HCT VFR BLD CALC: 32.9 % (ref 32.4–45.2)
HGB BLD-MCNC: 11.3 GM/DL (ref 10.7–15.3)
LYMPHOCYTES # BLD: 1.1 % (ref 8–40)
MAGNESIUM SERPL-MCNC: 1.8 MG/DL (ref 1.8–2.4)
MCH RBC QN AUTO: 34.5 PG (ref 25.7–33.7)
MCHC RBC AUTO-ENTMCNC: 34.3 G/DL (ref 32–36)
MCV RBC: 100.7 FL (ref 80–96)
MONOCYTES # BLD AUTO: 0.1 % (ref 3.8–10.2)
NEUTROPHILS # BLD: 97.8 % (ref 42.8–82.8)
PLATELET # BLD AUTO: 207 K/MM3 (ref 134–434)
PMV BLD: 6.7 FL (ref 7.5–11.1)
POTASSIUM SERPLBLD-SCNC: 3.9 MMOL/L (ref 3.5–5.1)
PROT SERPL-MCNC: 5.8 G/DL (ref 6.4–8.2)
RBC # BLD AUTO: 3.27 M/MM3 (ref 3.6–5.2)
SODIUM SERPL-SCNC: 139 MMOL/L (ref 136–145)
WBC # BLD AUTO: 95.3 K/MM3 (ref 4–10.8)

## 2019-10-01 RX ADMIN — HEPARIN SODIUM SCH UNIT: 5000 INJECTION, SOLUTION INTRAVENOUS; SUBCUTANEOUS at 21:47

## 2019-10-01 RX ADMIN — ACETAMINOPHEN SCH MG: 10 INJECTION, SOLUTION INTRAVENOUS at 12:00

## 2019-10-01 RX ADMIN — FLUTICASONE PROPIONATE SCH SPRAY: 50 SPRAY, METERED NASAL at 21:47

## 2019-10-01 RX ADMIN — MORPHINE SULFATE PRN MG: 2 INJECTION, SOLUTION INTRAMUSCULAR; INTRAVENOUS at 17:35

## 2019-10-01 RX ADMIN — FLUTICASONE PROPIONATE SCH SPRAY: 50 SPRAY, METERED NASAL at 09:50

## 2019-10-01 RX ADMIN — HEPARIN SODIUM SCH UNIT: 5000 INJECTION, SOLUTION INTRAVENOUS; SUBCUTANEOUS at 13:32

## 2019-10-01 RX ADMIN — ALLOPURINOL SCH: 300 TABLET ORAL at 19:19

## 2019-10-01 RX ADMIN — HYDROXYUREA SCH MG: 500 CAPSULE ORAL at 21:46

## 2019-10-01 RX ADMIN — MORPHINE SULFATE PRN MG: 2 INJECTION, SOLUTION INTRAMUSCULAR; INTRAVENOUS at 23:04

## 2019-10-01 RX ADMIN — HEPARIN SODIUM SCH: 5000 INJECTION, SOLUTION INTRAVENOUS; SUBCUTANEOUS at 06:09

## 2019-10-01 RX ADMIN — PANTOPRAZOLE SODIUM SCH MG: 20 TABLET, DELAYED RELEASE ORAL at 21:45

## 2019-10-01 RX ADMIN — SODIUM CHLORIDE SCH MLS/HR: 9 INJECTION, SOLUTION INTRAVENOUS at 10:00

## 2019-10-01 RX ADMIN — ACETAMINOPHEN SCH MG: 10 INJECTION, SOLUTION INTRAVENOUS at 17:36

## 2019-10-01 RX ADMIN — TOPIRAMATE SCH MG: 25 TABLET, FILM COATED ORAL at 09:37

## 2019-10-01 RX ADMIN — ALLOPURINOL SCH MG: 300 TABLET ORAL at 21:52

## 2019-10-01 RX ADMIN — HYDROXYUREA SCH MG: 500 CAPSULE ORAL at 09:38

## 2019-10-01 RX ADMIN — MONTELUKAST SODIUM SCH MG: 10 TABLET, COATED ORAL at 21:45

## 2019-10-01 RX ADMIN — ACETAMINOPHEN SCH MG: 10 INJECTION, SOLUTION INTRAVENOUS at 05:19

## 2019-10-01 NOTE — EKG
Test Reason : 

Blood Pressure : ***/*** mmHG

Vent. Rate : 088 BPM     Atrial Rate : 088 BPM

   P-R Int : 146 ms          QRS Dur : 068 ms

    QT Int : 346 ms       P-R-T Axes : 071 025 029 degrees

   QTc Int : 418 ms

 

NORMAL SINUS RHYTHM

POSSIBLE LEFT ATRIAL ENLARGEMENT

SEPTAL INFARCT , AGE UNDETERMINED

ABNORMAL ECG

WHEN COMPARED WITH ECG OF 22-FEB-2019 22:31,

NO SIGNIFICANT CHANGE WAS FOUND

Confirmed by Rob Cooper MD ([id number]) on 10/1/2019 10:13:34 AM

 

Referred By:  DR TILLMAN           Confirmed By:Rob Cooper MD

## 2019-10-01 NOTE — PN
Physical Exam: 


SUBJECTIVE: Patient seen and examined at bedside. Left flank pain is 5/10, 

persistent, dull. 








OBJECTIVE:





 Vital Signs











 Period  Temp  Pulse  Resp  BP Sys/Bethea  Pulse Ox


 


 Last 24 Hr  97.6 F-98.3 F  65-94  16-18  105-131/59-88  95-99











GENERAL: Awake, alert, and fully oriented, in no acute distress.


HEAD: Normal with no signs of trauma.


EYES: Pupils equal, round and reactive to light, extraocular movements intact, 

sclera anicteric, conjunctiva clear. No lid lag.


LUNGS: Breath sounds equal, clear to auscultation bilaterally. No wheezes, and 

no crackles. No accessory muscle use.


HEART: Regular rate and rhythm, S1 and S2 


ABDOMEN: Soft, nontender, not distended, normoactive bowel sounds; palpable 

splenic edge +tenderness 


MUSCULOSKELETAL: Normal range of motion at all joints. No bony deformities or 

tenderness. +mild left CVA tenderness


UPPER EXTREMITIES: 2+ pulses, warm, well-perfused. No cyanosis. No clubbing. No 

peripheral edema.


LOWER EXTREMITIES: 2+ pulses, warm, well-perfused. No calf tenderness. No 

peripheral edema. 


NEUROLOGICAL:  Cranial nerves II-XII intact. Normal speech. 


SKIN: Warm, dry, normal turgor











 Laboratory Results - last 24 hr











  09/30/19 09/30/19 09/30/19





  13:45 13:50 14:20


 


WBC  105.0 H*  


 


RBC  3.68  


 


Hgb  12.3  


 


Hct  36.8  


 


MCV  100.1 H  


 


MCH  33.5  


 


MCHC  33.5  


 


RDW  20.6 H  


 


Plt Count  241  


 


MPV  7.4 L  


 


Absolute Neuts (auto)  101.0  


 


Neutrophils %  No Result Required.  


 


Neutrophils % (Manual)  77.0  


 


Band Neutrophils %  13.0 H  


 


Lymphocytes %  No Result Required.  


 


Lymphocytes % (Manual)  1.0 L  


 


Monocytes % (Manual)  1 L  


 


Eosinophils % (Manual)  1.0  


 


Basophils % (Manual)  2.0  


 


Myelocytes % (Man)  3 H  


 


Metamyelocytes  2  


 


Anisocytosis  2+  


 


Macrocytosis  1+  


 


Sodium   


 


Potassium   


 


Chloride   


 


Carbon Dioxide   


 


Anion Gap   


 


BUN   


 


Creatinine   


 


Est GFR (CKD-EPI)AfAm   


 


Est GFR (CKD-EPI)NonAf   


 


Random Glucose   


 


Calcium   


 


Magnesium   


 


Total Bilirubin   


 


AST   


 


ALT   


 


Alkaline Phosphatase   


 


Creatine Kinase   


 


Troponin I    < 0.03


 


Total Protein   


 


Albumin   


 


Lipase   


 


Urine Color   Yellow 


 


Urine Appearance   Clear 


 


Urine pH   6.0 


 


Urine Protein   Negative 


 


Urine Glucose (UA)   Negative 


 


Urine Ketones   Negative 


 


Urine Blood   2+ H 


 


Urine Nitrite   Negative 


 


Urine Bilirubin   Negative 


 


Urine Urobilinogen   0.2 


 


Ur Leukocyte Esterase   1+ 


 


Urine RBC   5-10 


 


Urine WBC   5-10 


 


Ur Transition Epith Cell   Moderate 


 


Calcium Oxalate Crystal   Few 


 


Uric Acid Crystals   Few 


 


Hyaline Casts   0-2 


 


Urine Mucus   1+ 














  09/30/19 09/30/19





  14:20 14:20


 


WBC  


 


RBC  


 


Hgb  


 


Hct  


 


MCV  


 


MCH  


 


MCHC  


 


RDW  


 


Plt Count  


 


MPV  


 


Absolute Neuts (auto)  


 


Neutrophils %  


 


Neutrophils % (Manual)  


 


Band Neutrophils %  


 


Lymphocytes %  


 


Lymphocytes % (Manual)  


 


Monocytes % (Manual)  


 


Eosinophils % (Manual)  


 


Basophils % (Manual)  


 


Myelocytes % (Man)  


 


Metamyelocytes  


 


Anisocytosis  


 


Macrocytosis  


 


Sodium  140 


 


Potassium  3.9 


 


Chloride  106 


 


Carbon Dioxide  25 


 


Anion Gap  9 


 


BUN  13.0 


 


Creatinine  0.9 


 


Est GFR (CKD-EPI)AfAm  78.87 


 


Est GFR (CKD-EPI)NonAf  68.05 


 


Random Glucose  87 


 


Calcium  9.0 


 


Magnesium   1.9


 


Total Bilirubin  0.7 


 


AST  24 


 


ALT  19 


 


Alkaline Phosphatase  431 H D 


 


Creatine Kinase  46 


 


Troponin I  


 


Total Protein  6.6 


 


Albumin  3.6 


 


Lipase   78


 


Urine Color  


 


Urine Appearance  


 


Urine pH  


 


Urine Protein  


 


Urine Glucose (UA)  


 


Urine Ketones  


 


Urine Blood  


 


Urine Nitrite  


 


Urine Bilirubin  


 


Urine Urobilinogen  


 


Ur Leukocyte Esterase  


 


Urine RBC  


 


Urine WBC  


 


Ur Transition Epith Cell  


 


Calcium Oxalate Crystal  


 


Uric Acid Crystals  


 


Hyaline Casts  


 


Urine Mucus  








                           Active Medications











Generic Name Dose Route Start Last Admin





  Trade Name Freq  PRN Reason Stop Dose Admin


 


Acetaminophen  1,000 mg  09/30/19 23:00  10/01/19 05:19





  Ofirmev Injection -  IVPB  10/01/19 17:01  1,000 mg





  Q6H RAJ   Administration





     





     





     





     


 


Heparin Sodium (Porcine)  5,000 unit  09/30/19 22:00  10/01/19 06:09





  Heparin -  SQ   Not Given





  TID RAJ   





     





     





     





     


 


Hydroxyurea  1,000 mg  10/01/19 10:00  





  Hydrea -  PO  10/02/19 22:01  





  Q12H RAJ   





     





     





     





     


 


Lorazepam  2 mg  10/01/19 22:00  





  Ativan -  PO   





  HS RAJ   





     





     





     





     


 


Melatonin  5 mg  10/01/19 03:03  10/01/19 03:18





  Melatonin  PO   5 mg





  HS PRN   Administration





  INSOMNIA   





     





     





     


 


Montelukast Sodium  10 mg  09/30/19 22:00  09/30/19 21:27





  Singulair -  PO   10 mg





  HS RAJ   Administration





     





     





     





     


 


Morphine Sulfate  2 mg  09/30/19 23:24  09/30/19 23:33





  Morphine Injection -  IVPUSH   2 mg





  Q6H PRN   Administration





  PAIN LEVEL 6-10   





     





     





     


 


Non-Formulary Medication  250 mg  10/01/19 10:00  





  Telotristat Etiprate [Xermelo]  PO   





  DAILY RAJ   





     





     





     





     


 


Pantoprazole Sodium  20 mg  09/30/19 22:00  09/30/19 21:27





  Protonix -  PO   20 mg





  HS RAJ   Administration





     





     





     





     


 


Topiramate  50 mg  10/01/19 10:00  





  Topamax -  PO   





  DAILY RAJ   





     





     





     





     











ASSESSMENT/PLAN:


63 year-old female with a PMH significant for myeloproliferative neoplasm (June 2019), rectal carcinoid, leukocytosis, GERD, migraines, recurrent UTIs, 

osteomyeltitis C4-C5 s/p C4-C5 discectomy and fusion (2017), and cholecystitis s

/p lap blanca (02/2019). Admitted for hyperleukocytosis and splenomegaly. 





Splenomegaly


   --interval development since 3/10/19 


   --pain persists


   --heme consult placed





Hyperleukocytosis


Myeloproliferative neoplasm


Elevated alk phos


   --WBC has been elevated since February 2019, significant spike since July (40

-->105k)


   --has been on hyroxyurea 500mg daily


   --per Dr. Codie Ulloa at [facility], increase hydroxyurea to 1000mg q12h x 4-

5 doses; IV hydration, pain management, then f/u in office


   --IV tylenol scheduled x 4 doses; morphine PRN





Rectal carcinoid


   --continue Xermelo for control of diarrhea--non-formulary, patient needs to 

bring from home





GERD


   --continue protonix





Migraines


   --continue Topamax





Anxiety


   --continue lorazepam





FEN


   Fluids: NS @ 150mL/hr


   Electrolytes: replete as indicated


   Nutrition: regular diet





DVT prophylaxis: subq heparin





Dispo: continues to require inpatient care. Full code. 








Visit type





- Emergency Visit


Emergency Visit: Yes


ED Registration Date: 09/30/19


Care time: The patient presented to the Emergency Department on the above date 

and was hospitalized for further evaluation of their emergent condition.





- New Patient


This patient is new to me today: No





- Critical Care


Critical Care patient: No

## 2019-10-02 LAB
ALBUMIN SERPL-MCNC: 3.1 G/DL (ref 3.4–5)
ALP SERPL-CCNC: 483 U/L (ref 45–117)
ALT SERPL-CCNC: 48 U/L (ref 13–61)
ANION GAP SERPL CALC-SCNC: 5 MMOL/L (ref 8–16)
AST SERPL-CCNC: 59 U/L (ref 15–37)
BASOPHILS # BLD: 0.3 % (ref 0–2)
BILIRUB SERPL-MCNC: 0.4 MG/DL (ref 0.2–1)
BUN SERPL-MCNC: 11 MG/DL (ref 7–18)
CALCIUM SERPL-MCNC: 8.2 MG/DL (ref 8.5–10)
CHLORIDE SERPL-SCNC: 113 MMOL/L (ref 98–107)
CO2 SERPL-SCNC: 23 MMOL/L (ref 21–32)
CREAT SERPL-MCNC: 0.8 MG/DL (ref 0.55–1.3)
DEPRECATED RDW RBC AUTO: 20.2 % (ref 11.6–15.6)
EOSINOPHIL # BLD: 1 % (ref 0–4.5)
GLUCOSE SERPL-MCNC: 133 MG/DL (ref 74–106)
HCT VFR BLD CALC: 35.1 % (ref 32.4–45.2)
HGB BLD-MCNC: 11.7 GM/DL (ref 10.7–15.3)
LYMPHOCYTES # BLD: 1.3 % (ref 8–40)
MAGNESIUM SERPL-MCNC: 1.8 MG/DL (ref 1.8–2.4)
MCH RBC QN AUTO: 33.7 PG (ref 25.7–33.7)
MCHC RBC AUTO-ENTMCNC: 33.3 G/DL (ref 32–36)
MCV RBC: 101.3 FL (ref 80–96)
MONOCYTES # BLD AUTO: 0.1 % (ref 3.8–10.2)
NEUTROPHILS # BLD: 97.3 % (ref 42.8–82.8)
PLATELET # BLD AUTO: 198 K/MM3 (ref 134–434)
PMV BLD: 6.9 FL (ref 7.5–11.1)
POTASSIUM SERPLBLD-SCNC: 3.9 MMOL/L (ref 3.5–5.1)
PROT SERPL-MCNC: 5.9 G/DL (ref 6.4–8.2)
RBC # BLD AUTO: 3.46 M/MM3 (ref 3.6–5.2)
SODIUM SERPL-SCNC: 141 MMOL/L (ref 136–145)
WBC # BLD AUTO: 95.8 K/MM3 (ref 4–10.8)

## 2019-10-02 RX ADMIN — HEPARIN SODIUM SCH UNIT: 5000 INJECTION, SOLUTION INTRAVENOUS; SUBCUTANEOUS at 22:00

## 2019-10-02 RX ADMIN — MORPHINE SULFATE PRN MG: 2 INJECTION, SOLUTION INTRAMUSCULAR; INTRAVENOUS at 09:25

## 2019-10-02 RX ADMIN — TOPIRAMATE SCH MG: 25 TABLET, FILM COATED ORAL at 10:44

## 2019-10-02 RX ADMIN — ALLOPURINOL SCH MG: 300 TABLET ORAL at 10:45

## 2019-10-02 RX ADMIN — MONTELUKAST SODIUM SCH MG: 10 TABLET, COATED ORAL at 21:46

## 2019-10-02 RX ADMIN — FLUTICASONE PROPIONATE SCH SPRAY: 50 SPRAY, METERED NASAL at 09:25

## 2019-10-02 RX ADMIN — SODIUM CHLORIDE SCH MLS/HR: 9 INJECTION, SOLUTION INTRAVENOUS at 15:31

## 2019-10-02 RX ADMIN — HYDROXYUREA SCH: 500 CAPSULE ORAL at 09:39

## 2019-10-02 RX ADMIN — HYDROXYUREA SCH MG: 500 CAPSULE ORAL at 10:44

## 2019-10-02 RX ADMIN — ALLOPURINOL SCH: 300 TABLET ORAL at 09:40

## 2019-10-02 RX ADMIN — MORPHINE SULFATE PRN MG: 2 INJECTION, SOLUTION INTRAMUSCULAR; INTRAVENOUS at 21:45

## 2019-10-02 RX ADMIN — MORPHINE SULFATE PRN MG: 2 INJECTION, SOLUTION INTRAMUSCULAR; INTRAVENOUS at 15:52

## 2019-10-02 RX ADMIN — FLUTICASONE PROPIONATE SCH SPRAY: 50 SPRAY, METERED NASAL at 22:01

## 2019-10-02 RX ADMIN — TOPIRAMATE SCH: 25 TABLET, FILM COATED ORAL at 09:40

## 2019-10-02 RX ADMIN — HEPARIN SODIUM SCH UNIT: 5000 INJECTION, SOLUTION INTRAVENOUS; SUBCUTANEOUS at 06:36

## 2019-10-02 RX ADMIN — PANTOPRAZOLE SODIUM SCH MG: 20 TABLET, DELAYED RELEASE ORAL at 21:46

## 2019-10-02 RX ADMIN — HYDROXYUREA SCH MG: 500 CAPSULE ORAL at 21:49

## 2019-10-02 RX ADMIN — HEPARIN SODIUM SCH UNIT: 5000 INJECTION, SOLUTION INTRAVENOUS; SUBCUTANEOUS at 13:21

## 2019-10-02 NOTE — DS
Physical Exam: 


SUBJECTIVE: Patient seen and examined








OBJECTIVE:





 Vital Signs











 Period  Temp  Pulse  Resp  BP Sys/Bethea  Pulse Ox


 


 Last 24 Hr  98.0 F-99.0 F  73-91  16-18  123-140/55-67  94-97








PHYSICAL EXAM





GENERAL: The patient is awake, alert, and fully oriented, in no acute distress.


HEAD: Normal with no signs of trauma.


EYES: PERRL, extraocular movements intact, sclera anicteric, conjunctiva clear. 


ENT: Ears normal, nares patent, oropharynx clear without exudates, moist mucous 

membranes.


NECK: Trachea midline, full range of motion, supple. 


LUNGS: Breath sounds equal, clear to auscultation bilaterally, no wheezes, no 

crackles, no accessory muscle use. 


HEART: Regular rate and rhythm, S1, S2 without murmur, rub or gallop.


ABDOMEN: Soft, nontender, nondistended, normoactive bowel sounds, no guarding, 

no rebound, no hepatosplenomegaly, no masses.


EXTREMITIES: 2+ pulses, warm, well-perfused, no edema. 


NEUROLOGICAL: Cranial nerves II through XII grossly intact. Normal speech, gait 

not observed.


PSYCH: Normal mood, normal affect.


SKIN: Warm, dry, normal turgor, no rashes or lesions noted.





LABS


 Laboratory Results - last 24 hr











  10/02/19 10/02/19





  08:07 08:07


 


WBC  95.8 H* 


 


RBC  3.46 L 


 


Hgb  11.7 


 


Hct  35.1 


 


MCV  101.3 H 


 


MCH  33.7 


 


MCHC  33.3 


 


RDW  20.2 H 


 


Plt Count  198 


 


MPV  6.9 L 


 


Absolute Neuts (auto)  93.2 


 


Neutrophils %  97.3 H 


 


Lymphocytes %  1.3 L 


 


Monocytes %  0.1 L 


 


Eosinophils %  1.0 


 


Basophils %  0.3 


 


Sodium   141


 


Potassium   3.9


 


Chloride   113 H


 


Carbon Dioxide   23


 


Anion Gap   5 L


 


BUN   11.0


 


Creatinine   0.8


 


Est GFR (CKD-EPI)AfAm   90.30


 


Est GFR (CKD-EPI)NonAf   77.91


 


Random Glucose   133 H


 


Calcium   8.2 L


 


Magnesium   1.8


 


Total Bilirubin   0.4


 


AST   59 H


 


ALT   48


 


Alkaline Phosphatase   483 H D


 


Total Protein   5.9 L


 


Albumin   3.1 L











HOSPITAL COURSE:





Date of Admission:09/30/19





Date of Discharge: 10/02/19











Minutes to complete discharge: 35





Discharge Summary


Problems reviewed: Yes


Reason For Visit: HYPERLEUKOCYTOSIS


Current Active Problems





Hyperleukocytosis (Acute)


Left flank pain (Acute)








Condition: Guarded





- Instructions





- Home Medications


Comprehensive Discharge Medication List: 


Ambulatory Orders





Montelukast Na [Singulair -] 10 mg PO HS 03/27/17 


Topiramate [Topamax] 50 mg PO DAILY 03/27/17 


Lorazepam [Ativan] 2 mg PO HS 04/15/17 


Octreotide Acetate [Sandostatin] 30 mcg IJ MONTHLY  ampul 08/31/17 


Telotristat Etiprate [Xermelo] 250 mg PO DAILY 02/22/19 


Lipase/Protease/Amylase [Creon Dr 24,000 Units Capsule] 4 each PO TID 03/10/19 


Omeprazole 20 mg PO HS 03/10/19 


Hydroxyurea [Hydrea -] 500 mg PO DAILY 09/30/19 








This patient is new to me today: No


Emergency Visit: Yes


ED Registration Date: 09/30/19


Care time: The patient presented to the Emergency Department on the above date 

and was hospitalized for further evaluation of their emergent condition.


Critical Care patient: No





- Discharge Referral


Referred to [facility] No

## 2019-10-03 LAB
ALBUMIN SERPL-MCNC: 3.2 G/DL (ref 3.4–5)
ALP SERPL-CCNC: 511 U/L (ref 45–117)
ALT SERPL-CCNC: 47 U/L (ref 13–61)
ANION GAP SERPL CALC-SCNC: 9 MMOL/L (ref 8–16)
AST SERPL-CCNC: 40 U/L (ref 15–37)
BASOPHILS # BLD: 0.2 % (ref 0–2)
BILIRUB SERPL-MCNC: 0.8 MG/DL (ref 0.2–1)
BUN SERPL-MCNC: 13 MG/DL (ref 7–18)
CALCIUM SERPL-MCNC: 8.5 MG/DL (ref 8.5–10)
CHLORIDE SERPL-SCNC: 109 MMOL/L (ref 98–107)
CO2 SERPL-SCNC: 22 MMOL/L (ref 21–32)
CREAT SERPL-MCNC: 0.8 MG/DL (ref 0.55–1.3)
DEPRECATED RDW RBC AUTO: 19.8 % (ref 11.6–15.6)
EOSINOPHIL # BLD: 1.4 % (ref 0–4.5)
EPITH CASTS URNS QL MICRO: (no result) /HPF
GLUCOSE SERPL-MCNC: 150 MG/DL (ref 74–106)
HCT VFR BLD CALC: 34.9 % (ref 32.4–45.2)
HGB BLD-MCNC: 11.7 GM/DL (ref 10.7–15.3)
LYMPHOCYTES # BLD: 1.7 % (ref 8–40)
MAGNESIUM SERPL-MCNC: 1.8 MG/DL (ref 1.8–2.4)
MCH RBC QN AUTO: 34 PG (ref 25.7–33.7)
MCHC RBC AUTO-ENTMCNC: 33.7 G/DL (ref 32–36)
MCV RBC: 101 FL (ref 80–96)
MONOCYTES # BLD AUTO: 0.1 % (ref 3.8–10.2)
NEUTROPHILS # BLD: 96.6 % (ref 42.8–82.8)
PLATELET # BLD AUTO: 155 K/MM3 (ref 134–434)
PMV BLD: 7.4 FL (ref 7.5–11.1)
POTASSIUM SERPLBLD-SCNC: 3.9 MMOL/L (ref 3.5–5.1)
PROT SERPL-MCNC: 6.3 G/DL (ref 6.4–8.2)
RBC # BLD AUTO: 3.46 M/MM3 (ref 3.6–5.2)
SODIUM SERPL-SCNC: 140 MMOL/L (ref 136–145)
WBC # BLD AUTO: 83.5 K/MM3 (ref 4–10.8)

## 2019-10-03 RX ADMIN — MEROPENEM SCH MLS/HR: 1 INJECTION, POWDER, FOR SOLUTION INTRAVENOUS at 10:13

## 2019-10-03 RX ADMIN — DOCUSATE SODIUM,SENNOSIDES SCH TABLET: 50; 8.6 TABLET, FILM COATED ORAL at 10:00

## 2019-10-03 RX ADMIN — DOCUSATE SODIUM,SENNOSIDES SCH TABLET: 50; 8.6 TABLET, FILM COATED ORAL at 21:24

## 2019-10-03 RX ADMIN — MEROPENEM SCH MLS/HR: 1 INJECTION, POWDER, FOR SOLUTION INTRAVENOUS at 17:42

## 2019-10-03 RX ADMIN — FLUTICASONE PROPIONATE SCH SPRAY: 50 SPRAY, METERED NASAL at 10:13

## 2019-10-03 RX ADMIN — TOPIRAMATE SCH MG: 25 TABLET, FILM COATED ORAL at 09:59

## 2019-10-03 RX ADMIN — HYDROXYUREA SCH MG: 500 CAPSULE ORAL at 21:25

## 2019-10-03 RX ADMIN — PANTOPRAZOLE SODIUM SCH MG: 20 TABLET, DELAYED RELEASE ORAL at 21:24

## 2019-10-03 RX ADMIN — HEPARIN SODIUM SCH UNIT: 5000 INJECTION, SOLUTION INTRAVENOUS; SUBCUTANEOUS at 06:34

## 2019-10-03 RX ADMIN — HEPARIN SODIUM SCH UNIT: 5000 INJECTION, SOLUTION INTRAVENOUS; SUBCUTANEOUS at 21:28

## 2019-10-03 RX ADMIN — MONTELUKAST SODIUM SCH MG: 10 TABLET, COATED ORAL at 21:24

## 2019-10-03 RX ADMIN — MORPHINE SULFATE PRN MG: 2 INJECTION, SOLUTION INTRAMUSCULAR; INTRAVENOUS at 06:35

## 2019-10-03 RX ADMIN — ACETAMINOPHEN PRN MG: 10 INJECTION, SOLUTION INTRAVENOUS at 18:55

## 2019-10-03 RX ADMIN — HEPARIN SODIUM SCH UNIT: 5000 INJECTION, SOLUTION INTRAVENOUS; SUBCUTANEOUS at 15:00

## 2019-10-03 RX ADMIN — ALLOPURINOL SCH MG: 300 TABLET ORAL at 10:00

## 2019-10-03 RX ADMIN — MORPHINE SULFATE PRN MG: 2 INJECTION, SOLUTION INTRAMUSCULAR; INTRAVENOUS at 15:03

## 2019-10-03 RX ADMIN — ACETAMINOPHEN PRN MG: 10 INJECTION, SOLUTION INTRAVENOUS at 23:23

## 2019-10-03 RX ADMIN — FLUTICASONE PROPIONATE SCH SPRAY: 50 SPRAY, METERED NASAL at 21:28

## 2019-10-03 NOTE — DS
Physical Exam: 


SUBJECTIVE: Patient seen and examined. Left flank pain is worse, feels some 

SOB. Some chills last night.








OBJECTIVE:





 Vital Signs











 Period  Temp  Pulse  Resp  BP Sys/Bethea  Pulse Ox


 


 Last 24 Hr  98.3 F-100.0 F  72-90  17-18  118-135/55-64  93-98








PHYSICAL EXAM





GENERAL: The patient is awake, alert, and fully oriented, in mild distress 

secondary to left flank pain


LUNGS: Breath sounds equal, clear to auscultation 


HEART: Regular rate and rhythm, S1, S2 


ABDOMEN: Tenderness under left breast, left flank; splenic edge palpable


EXTREMITIES: 2+ pulses, warm, well-perfused, no edema. 


NEUROLOGICAL: Cranial nerves II through XII grossly intact. Normal speech, 

steady gait 


SKIN: Warm, dry, normal turgor, no rashes or lesions noted





LABS


 Laboratory Results - last 24 hr











  10/03/19 10/03/19 10/03/19





  06:57 06:57 08:00


 


WBC  83.5 H*  


 


RBC  3.46 L  


 


Hgb  11.7  


 


Hct  34.9  


 


MCV  101.0 H  


 


MCH  34.0 H  


 


MCHC  33.7  


 


RDW  19.8 H  


 


Plt Count  155  


 


MPV  7.4 L  


 


Absolute Neuts (auto)  80.6  


 


Neutrophils %  96.6 H  


 


Lymphocytes %  1.7 L  


 


Monocytes %  0.1 L  


 


Eosinophils %  1.4  


 


Basophils %  0.2  


 


Sodium   140 


 


Potassium   3.9 


 


Chloride   109 H 


 


Carbon Dioxide   22 


 


Anion Gap   9 


 


BUN   13.0 


 


Creatinine   0.8 


 


Est GFR (CKD-EPI)AfAm   90.30 


 


Est GFR (CKD-EPI)NonAf   77.91 


 


Random Glucose   150 H 


 


Calcium   8.5 


 


Magnesium   1.8 


 


Total Bilirubin   0.8 


 


AST   40 H 


 


ALT   47 


 


Alkaline Phosphatase   511 H D 


 


Total Protein   6.3 L 


 


Albumin   3.2 L 


 


Urine Color    Yellow


 


Urine Appearance    Clear


 


Urine pH    6.5


 


Urine Protein    Negative


 


Urine Glucose (UA)    Negative


 


Urine Ketones    Negative


 


Urine Blood    2+ H


 


Urine Nitrite    Negative


 


Urine Bilirubin    Negative


 


Urine Urobilinogen    0.2


 


Ur Leukocyte Esterase    Negative


 


Urine RBC    2-5


 


Urine WBC    0-2


 


Ur Transition Epith Cell    Few











HOSPITAL COURSE:





Date of Admission:09/30/19





Date of Discharge: 10/03/19





Pre hospital course


63 year-old female with a PMH significant for myeloproliferative neoplasm (June 2019), rectal carcinoid, leukocytosis, GERD, migraines, recurrent UTIs, 

osteomyeltitis C4-C5 s/p C4-C5 discectomy and fusion (2017), and cholecystitis s

/p lap blanca (02/2019). Presented to the ED with left rib/flank pain x 24 

hours. Pt was in her USOH, last night developed gradual onset mild left lower 

flank/rib pain, did not take anything and slept throughout the night but awoke 

this morning with increased pain, positional and worse with deep inspiration. 

The pain radiates to the left shoulder. Denies fever, sweats, chills; denies 

nausea, vomiting, diarrhea; denies dysuria, urgency, frequency. Last BM 2days 

ago, normal. 





ER course


(1) WBC 105k


(2) Alk phos 431


(3) CTAP: since 3/10/19 interval development of splenomegaly





Subsequent hospital course


63 year-old female with a PMH significant for myeloproliferative disorder (June 2019), rectal carcinoid, leukocytosis, GERD, migraines, recurrent UTIs, 

osteomyeltitis C4-C5 s/p C4-C5 discectomy and fusion (2017), and cholecystitis s

/p lap blanca (02/2019). Admitted for hyperleukocytosis and splenomegaly. 





Splenomegaly


   --interval development since 3/10/19; increase in size from [id number] to 

[id number]


   --left upper quadrant/flank pain worsening





Hyperleukocytosis


Myeloproliferative disorder


   --WBC 105k on admission (40s in July 2019); trending down since admission, 

today 83.5k on hydroxyurea 1g q12h


   --stopped IV fluids today due to volume overload (pleural effusions, mild 

diffuse mesenteric and subq edema seen on MRI) 


   --increased morphine for worsening discomfort, continue IV Tylenol  


   --accepted for transfer to [facility], pending bed assignment


   --Heme Dr. Londono following





Rule out pneumonia


   --low grade temp, possible infiltrates on MRI on 9/2 and CXR on 9/3


   --cultures pending


   --start meropenem (day #1)


   --ID following


   


Rectal carcinoid


   --continue Xermelo





GERD


   --continue protonix





Migraines


   --continue Topomax





Anxiety


   --continue lorazepam





FEN


   Fluids: PO intake adequate


   Electrolytes: replete as indicated


   Nutrition: regular diet





DVT prophylaxis: subq heparin





Dispo: continues to require inpatient care. Pending transfer to [facility]. 

Full code.











Minutes to complete discharge: 35





Discharge Summary


Problems reviewed: Yes


Reason For Visit: HYPERLEUKOCYTOSIS


Current Active Problems





Hyperleukocytosis (Acute)


Left flank pain (Acute)








Condition: Stable





- Instructions


Disposition: TRANSFER ACUTE CARE/OTHER HOSP





- Home Medications


Comprehensive Discharge Medication List: 


Ambulatory Orders





Octreotide Acetate [Sandostatin] 30 mcg IJ MONTHLY  ampul 08/31/17 


Lipase/Protease/Amylase [Creon Dr 24,000 Units Capsule] 4 each PO TID 03/10/19 


Hydroxyurea [Hydrea 500Mg Capsule -] 500 mg PO DAILY 09/30/19 


Acetaminophen [Tylenol .Regular Strength -] 650 mg PO Q6H PRN  tablet 10/03/19 


Allopurinol [Zyloprim -] 300 mg PO DAILY  tablet 10/03/19 


Fluticasone Prop 0.05% Nasal [Flonase -] 1 spray NS BID  spray 10/03/19 


Heparin - 5,000 unit SQ TID  vial 10/03/19 


Hydroxyurea [Hydrea 500Mg Capsule -] 1,000 mg PO BID  capsule 10/03/19 


LORazepam [Ativan] 2 mg PO HS #2 tablet MDD 1 10/03/19 


Melatonin 5 mg PO HS PRN  tab 10/03/19 


Meropenem [Merrem (Restricted To Id) -] 1 gm IVPB Q8H-IV  vial 10/03/19 


Montelukast Na [Singulair -] 10 mg PO HS  tablet 10/03/19 


Morphine Sulfate 4 mg IVPUSH Q6H PRN #1 vial MDD 4 10/03/19 


Pantoprazole Sodium [Protonix -] 20 mg PO HS  tablet.ec 10/03/19 


Sennosides/Docusate Sodium [Pericolace -] 1 tablet PO BID  tablet 10/03/19 


Telotristat Etiprate [Xermelo] 250 mg PO DAILY #30 tab 10/03/19 


Topiramate [Topamax -] 50 mg PO DAILY  tablet 10/03/19 








This patient is new to me today: No


Emergency Visit: Yes


ED Registration Date: 09/30/19


Care time: The patient presented to the Emergency Department on the above date 

and was hospitalized for further evaluation of their emergent condition.


Critical Care patient: Yes


Total Critical Care Time (in minutes): 45


Critical Care Statement: The care of this patient involved high complexity 

decision making to prevent further life threatening deterioration of the patient

's condition and/or to evaluate & treat vital organ system(s) failure or risk 

of failure.





- Discharge Referral


Referred to [facility] No

## 2019-10-03 NOTE — PN
Physical Exam: 


SUBJECTIVE: Patient seen and examined. Left flank pain persists. Denies any 

difficulty breathing. 








OBJECTIVE:





 Vital Signs











 Period  Temp  Pulse  Resp  BP Sys/Bethea  Pulse Ox


 


 Last 24 Hr  98.0 F-99.0 F  73-91  16-18  123-140/55-67  94-97











GENERAL: The patient is awake, alert, and fully oriented, in no acute distress.


LUNGS: Breath sounds equal, clear to auscultation 


HEART: Regular rate and rhythm, S1, S2 


ABDOMEN: Tenderness under left breast, left flank; splenic edge palpable


EXTREMITIES: 2+ pulses, warm, well-perfused, no edema. 


NEUROLOGICAL: Cranial nerves II through XII grossly intact. Normal speech, 

steady gait 


SKIN: Warm, dry, normal turgor, no rashes or lesions noted














 Laboratory Results - last 24 hr











  10/02/19 10/02/19





  08:07 08:07


 


WBC  95.8 H* 


 


RBC  3.46 L 


 


Hgb  11.7 


 


Hct  35.1 


 


MCV  101.3 H 


 


MCH  33.7 


 


MCHC  33.3 


 


RDW  20.2 H 


 


Plt Count  198 


 


MPV  6.9 L 


 


Absolute Neuts (auto)  93.2 


 


Neutrophils %  97.3 H 


 


Lymphocytes %  1.3 L 


 


Monocytes %  0.1 L 


 


Eosinophils %  1.0 


 


Basophils %  0.3 


 


Sodium   141


 


Potassium   3.9


 


Chloride   113 H


 


Carbon Dioxide   23


 


Anion Gap   5 L


 


BUN   11.0


 


Creatinine   0.8


 


Est GFR (CKD-EPI)AfAm   90.30


 


Est GFR (CKD-EPI)NonAf   77.91


 


Random Glucose   133 H


 


Calcium   8.2 L


 


Magnesium   1.8


 


Total Bilirubin   0.4


 


AST   59 H


 


ALT   48


 


Alkaline Phosphatase   483 H D


 


Total Protein   5.9 L


 


Albumin   3.1 L

















ASSESSMENT/PLAN:


63 year-old female with a PMH significant for myeloproliferative disorder (June 2019), rectal carcinoid, leukocytosis, GERD, migraines, recurrent UTIs, 

osteomyeltitis C4-C5 s/p C4-C5 discectomy and fusion (2017), and cholecystitis s

/p lap blanca (02/2019). Admitted for hyperleukocytosis and splenomegaly. 





Splenomegaly


   --interval development since 3/10/19 


   --left upper quadrant/flank pain persists





Hyperleukocytosis


Myeloproliferative disorder


   --WBC 105k on admission, today 95.8k; continue hydroxyurea q12h, IV hydration


   --continue pain management with IV tylenol and morphine 


   --discussed with PA per Dr. Ulloa office, accepted for transfer to [facility]


   


Rectal carcinoid


   --continue Xermelo





GERD


   --continue protonix





Migraines


   --continue Topomax





Anxiety


   --continue lorazepam





FEN


   Fluids: NS @ 75mL/hr


   Electrolytes: replete as indicated


   Nutrition: regular diet





DVT prophylaxis: subq heparin





Dispo: continues to require inpatient care. Full code. Pending transfer to [facility], waiting for bed assignment.





Visit type





- Emergency Visit


Emergency Visit: Yes


ED Registration Date: 09/30/19


Care time: The patient presented to the Emergency Department on the above date 

and was hospitalized for further evaluation of their emergent condition.





- New Patient


This patient is new to me today: No





- Critical Care


Critical Care patient: No

## 2019-10-03 NOTE — CONSULT
Consult


Consult Specialty:: heme onc


Referred by:: mitzi sharif


Reason for Consultation:: hyperleukocytosis





- History of Present Illness


Chief Complaint: abd pain


History of Present Illness: 





64 yof w MPN dxd 3mos ago in setting of persistent elev wbc and txd w  

mg daily adm w 1 d onset severe L abd pain.


Found to have hyperleukocytosis w wbc 105K no blasts vs her usual 40K, reported

  Imaging evidenced new splenomeg  


Started on ivf, allopurinol, analgesia,and HU dosing increased,currently 1g bid


reports that L abd feels softer. 


Noteworthy is also h/o rectal carcinoid dxd . No resection. Currently txd 

w sandostatin and is s/p xeloda/temodar/afinitor. Also takes xermelo as needed 

for intermittent diarrhea





- History Source


History Provided By: Patient, Medical Record


Limitations to Obtaining History: No Limitations





- Past Medical History


Pulmonary: Yes: Other (recent URI (3wks ago, Z-willem finished around 2/10))


Gastrointestinal: Yes: Cancer (rectal carcinoid), GERD, Hiatal Hernia, Other (

large rectal carcinoid dx'ed , never resected, had RT & chemorx, has liver 

lesions, on octreotide, diarrhea managed with Xermelo )


Infectious Disease: Yes: Other (Osteomyelitis of C4-C5)


Musculoskeletal: Yes: Osteoarthritis, Other (spinal stenosis, C4-C5 fusion 

after osteomyelitis)


Endocrine: Yes: Other (thyroid nodule)





- Past Surgical History


Past Surgical History: Yes: Arthrosocopy (left knee), Cholecystectomy, 

Colonoscopy,  (x 2), Tonsillectomy, Upper Endoscopy





- Alcohol/Substance Use


Hx Alcohol Use: No


History of Substance Use: reports: Marijuana (few times a month)





- Smoking History


Smoking history: Never smoked


Have you smoked in the past 12 months: No





- Social History


Usual Living Arrangement: With Spouse


ADL: Independent


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


History of Recent Travel: No





Home Medications





- Allergies


Allergies/Adverse Reactions: 


 Allergies











Allergy/AdvReac Type Severity Reaction Status Date / Time


 


everolimus [From Afinitor] AdvReac Severe SOB, Verified 19 14:40





   Myalgias,  





   Arthralgias,Edema  


 


erythromycin base AdvReac   Verified 19 14:40





[Erythromycin Base]     














- Home Medications


Home Medications: 


Ambulatory Orders





Octreotide Acetate [Sandostatin] 30 mcg IJ MONTHLY  ampul 17 


Lipase/Protease/Amylase [Creon Dr 24,000 Units Capsule] 4 each PO TID 03/10/19 


Hydroxyurea [Hydrea 500Mg Capsule -] 500 mg PO DAILY 19 


Acetaminophen [Tylenol .Regular Strength -] 650 mg PO Q6H PRN  tablet 10/03/19 


Allopurinol [Zyloprim -] 300 mg PO DAILY  tablet 10/03/19 


Fluticasone Prop 0.05% Nasal [Flonase -] 1 spray NS BID  spray 10/03/19 


Heparin - 5,000 unit SQ TID  vial 10/03/19 


Hydroxyurea [Hydrea 500Mg Capsule -] 1,000 mg PO BID  capsule 10/03/19 


LORazepam [Ativan] 2 mg PO HS #2 tablet MDD 1 10/03/19 


Melatonin 5 mg PO HS PRN  tab 10/03/19 


Meropenem [Merrem (Restricted To Id) -] 1 gm IVPB Q8H-IV  vial 10/03/19 


Montelukast Na [Singulair -] 10 mg PO HS  tablet 10/03/19 


Morphine Sulfate 4 mg IVPUSH Q6H PRN #1 vial MDD 4 10/03/19 


Pantoprazole Sodium [Protonix -] 20 mg PO HS  tablet.ec 10/03/19 


Sennosides/Docusate Sodium [Pericolace -] 1 tablet PO BID  tablet 10/03/19 


Telotristat Etiprate [Xermelo] 250 mg PO DAILY #30 tab 10/03/19 


Topiramate [Topamax -] 50 mg PO DAILY  tablet 10/03/19 











Physical Exam


Vital Signs: 


 Vital Signs











Temperature  100.8 F H  10/03/19 17:32


 


Pulse Rate  85   10/03/19 14:00


 


Respiratory Rate  17   10/03/19 14:00


 


Blood Pressure  130/57 L  10/03/19 14:00


 


O2 Sat by Pulse Oximetry (%)  96   10/03/19 14:00











Constitutional: Yes: Well Nourished, Moderate Distress


Eyes: Yes: WNL


HENT: Yes: Atraumatic


Neck: Yes: Supple


Cardiovascular: Yes: Regular Rate and Rhythm


Respiratory: Yes: Other (dec bs b/b)


Gastrointestinal: Yes: Other (soft, palp splenic edge, exquisite tendernessLQ 

splenic region)


Edema: No


Neurological: Yes: WNL


Psychiatric: Yes: WNL, Alert, Oriented


Labs: 


 CBC, BMP





 10/03/19 06:57 





 10/03/19 06:57 











Assessment/Plan





acute onset severe LQ pain, bkd mpn w new SM, hyperleukocytosis c/w splenic 

infarct


While 


this is not evidenced on imaging, picture is clin c/w this


no thromboses noted


agree w current mgmt: HU increased and will titrate to counts; titrate 

analgesia to her pain; resume ivf pend fluid status in AM; allopurinol started


cont abx for possib pna tho I suspect low gr temp and small effns are related 

to splenic process


Have communicated w Dr. Ulloa

## 2019-10-03 NOTE — PN
Physical Exam: 


SUBJECTIVE: Patient seen and examined. Left flank pain is worse, feels some 

SOB. Some chills last night.  








OBJECTIVE:





 Vital Signs











 Period  Temp  Pulse  Resp  BP Sys/Bethea  Pulse Ox


 


 Last 24 Hr  98.1 F-100.0 F  72-82  17-18  118-140/55-64  93-97











GENERAL: The patient is awake, alert, and fully oriented, in mild distress 

secondary to left flank pain


LUNGS: Breath sounds equal, clear to auscultation 


HEART: Regular rate and rhythm, S1, S2 


ABDOMEN: Tenderness under left breast, left flank; splenic edge palpable


EXTREMITIES: 2+ pulses, warm, well-perfused, no edema. 


NEUROLOGICAL: Cranial nerves II through XII grossly intact. Normal speech, 

steady gait 


SKIN: Warm, dry, normal turgor, no rashes or lesions noted














 Laboratory Results - last 24 hr











  10/03/19 10/03/19





  06:57 06:57


 


WBC  83.5 H* 


 


RBC  3.46 L 


 


Hgb  11.7 


 


Hct  34.9 


 


MCV  101.0 H 


 


MCH  34.0 H 


 


MCHC  33.7 


 


RDW  19.8 H 


 


Plt Count  155 


 


MPV  7.4 L 


 


Absolute Neuts (auto)  80.6 


 


Neutrophils %  96.6 H 


 


Lymphocytes %  1.7 L 


 


Monocytes %  0.1 L 


 


Eosinophils %  1.4 


 


Basophils %  0.2 


 


Sodium   140


 


Potassium   3.9


 


Chloride   109 H


 


Carbon Dioxide   22


 


Anion Gap   9


 


BUN   13.0


 


Creatinine   0.8


 


Est GFR (CKD-EPI)AfAm   90.30


 


Est GFR (CKD-EPI)NonAf   77.91


 


Random Glucose   150 H


 


Calcium   8.5


 


Magnesium   1.8


 


Total Bilirubin   0.8


 


AST   40 H


 


ALT   47


 


Alkaline Phosphatase   511 H D


 


Total Protein   6.3 L


 


Albumin   3.2 L








                  Active Medications











Generic Name Dose Route Start Last Admin





  Trade Name Freq  PRN Reason Stop Dose Admin


 


Acetaminophen  650 mg  10/02/19 18:29  10/02/19 18:34





  Tylenol -  PO   650 mg





  Q6H PRN   Administration





  PAIN LEVEL 1-5   





     





     





     


 


Allopurinol  300 mg  10/02/19 10:45  10/02/19 10:45





  Zyloprim -  PO   300 mg





  DAILY RAJ   Administration





     





     





     





     


 


Fluticasone Propionate  1 spray  10/01/19 10:00  10/02/19 22:01





  Flonase -  NS   1 spray





  BID RAJ   Administration





     





     





     





     


 


Heparin Sodium (Porcine)  5,000 unit  09/30/19 22:00  10/03/19 06:34





  Heparin -  SQ   5,000 unit





  TID RAJ   Administration





     





     





     





     


 


Meropenem 1 gm/ Dextrose  100 mls @ 200 mls/hr  10/03/19 10:00  





  IVPB   





  Q8H-IV RAJ   





     





     





     





     


 


Lorazepam  2 mg  10/01/19 22:00  10/02/19 21:46





  Ativan -  PO   2 mg





  HS RAJ   Administration





     





     





     





     


 


Melatonin  5 mg  10/01/19 03:03  10/01/19 03:18





  Melatonin  PO   5 mg





  HS PRN   Administration





  INSOMNIA   





     





     





     


 


Montelukast Sodium  10 mg  09/30/19 22:00  10/02/19 21:46





  Singulair -  PO   10 mg





  HS RAJ   Administration





     





     





     





     


 


Morphine Sulfate  2 mg  09/30/19 23:24  10/03/19 06:35





  Morphine Injection -  IVPUSH   2 mg





  Q6H PRN   Administration





  PAIN LEVEL 6-10   





     





     





     


 


Non-Formulary Medication  250 mg  10/01/19 10:00  





  Telotristat Etiprate [Xermelo]  PO   





  DAILY RAJ   





     





     





     





     


 


Pantoprazole Sodium  20 mg  09/30/19 22:00  10/02/19 21:46





  Protonix -  PO   20 mg





  HS RAJ   Administration





     





     





     





     


 


Senna/Docusate Sodium  1 tablet  10/03/19 10:00  





  Pericolace -  PO   





  BID ARJ   





     





     





     





     


 


Topiramate  50 mg  10/02/19 10:45  10/02/19 10:44





  Topamax -  PO   50 mg





  DAILY RAJ   Administration





     





     





     





     











ASSESSMENT/PLAN:


63 year-old female with a PMH significant for myeloproliferative disorder (June 2019), rectal carcinoid, leukocytosis, GERD, migraines, recurrent UTIs, 

osteomyeltitis C4-C5 s/p C4-C5 discectomy and fusion (2017), and cholecystitis s

/p lap blanca (02/2019). Admitted for hyperleukocytosis and splenomegaly. 





Splenomegaly


   --interval development since 3/10/19 


   --left upper quadrant/flank pain worsening





Hyperleukocytosis


Myeloproliferative disorder


   --WBC 105k on admission, today 83.5k; continue hydroxyurea 1g q24h


   --stopped IV fluids due to volume overload (pleural effusions, mild diffuse 

mesenteric and subq edema seen on MRI) but will hold off diuresis pending heme 

input


   --continue pain management with IV tylenol and morphine 


   --accepted for transfer to [facility], pending bed assignment


   --Heme Dr. Londono following





Rule out pneumonia


   --low grade temp, possible infiltrates on MRI on 9/2 and CXR on 9/3


   --culture


   --start meropenem


   --ID following


   


Rectal carcinoid


   --continue Xermelo





GERD


   --continue protonix





Migraines


   --continue Topomax





Anxiety


   --continue lorazepam





FEN


   Fluids: PO intake adequate


   Electrolytes: replete as indicated


   Nutrition: regular diet





DVT prophylaxis: subq heparin





Dispo: continues to require inpatient care. Pending transfer to [facility]. 

Full code.














Fever to 100.0 rectal


Feels SOB


CXR: left pleural effusion


MRI: bilateral pleural effusions v. infiltrates


Culture


Start meropenem; ID following


Stop IV fluids; no diuresis


Pre post








Visit type





- Emergency Visit


Emergency Visit: Yes


ED Registration Date: 09/30/19


Care time: The patient presented to the Emergency Department on the above date 

and was hospitalized for further evaluation of their emergent condition.





- New Patient


This patient is new to me today: No





- Critical Care


Critical Care patient: No

## 2019-10-04 LAB
ALBUMIN SERPL-MCNC: 3 G/DL (ref 3.4–5)
ALP SERPL-CCNC: 505 U/L (ref 45–117)
ALT SERPL-CCNC: 49 U/L (ref 13–61)
ANION GAP SERPL CALC-SCNC: 6 MMOL/L (ref 8–16)
ANISOCYTOSIS BLD QL: (no result)
AST SERPL-CCNC: 44 U/L (ref 15–37)
BILIRUB SERPL-MCNC: 0.5 MG/DL (ref 0.2–1)
BUN SERPL-MCNC: 15 MG/DL (ref 7–18)
CALCIUM SERPL-MCNC: 8 MG/DL (ref 8.5–10)
CHLORIDE SERPL-SCNC: 110 MMOL/L (ref 98–107)
CO2 SERPL-SCNC: 21 MMOL/L (ref 21–32)
CREAT SERPL-MCNC: 0.8 MG/DL (ref 0.55–1.3)
DEPRECATED RDW RBC AUTO: 19.5 % (ref 11.6–15.6)
GLUCOSE SERPL-MCNC: 180 MG/DL (ref 74–106)
HCT VFR BLD CALC: 33 % (ref 32.4–45.2)
HGB BLD-MCNC: 11.2 GM/DL (ref 10.7–15.3)
MAGNESIUM SERPL-MCNC: 1.8 MG/DL (ref 1.8–2.4)
MCH RBC QN AUTO: 34.6 PG (ref 25.7–33.7)
MCHC RBC AUTO-ENTMCNC: 33.9 G/DL (ref 32–36)
MCV RBC: 101.9 FL (ref 80–96)
PLATELET # BLD AUTO: 119 K/MM3 (ref 134–434)
PLATELET BLD QL SMEAR: (no result)
PMV BLD: 7.5 FL (ref 7.5–11.1)
POTASSIUM SERPLBLD-SCNC: 3.3 MMOL/L (ref 3.5–5.1)
PROT SERPL-MCNC: 5.8 G/DL (ref 6.4–8.2)
RBC # BLD AUTO: 3.23 M/MM3 (ref 3.6–5.2)
SODIUM SERPL-SCNC: 137 MMOL/L (ref 136–145)
WBC # BLD AUTO: 63.8 K/MM3 (ref 4–10.8)

## 2019-10-04 RX ADMIN — MEROPENEM SCH: 1 INJECTION, POWDER, FOR SOLUTION INTRAVENOUS at 21:19

## 2019-10-04 RX ADMIN — HEPARIN SODIUM SCH UNIT: 5000 INJECTION, SOLUTION INTRAVENOUS; SUBCUTANEOUS at 14:48

## 2019-10-04 RX ADMIN — PANTOPRAZOLE SODIUM SCH MG: 20 TABLET, DELAYED RELEASE ORAL at 21:20

## 2019-10-04 RX ADMIN — DOCUSATE SODIUM,SENNOSIDES SCH TABLET: 50; 8.6 TABLET, FILM COATED ORAL at 09:46

## 2019-10-04 RX ADMIN — ALLOPURINOL SCH MG: 300 TABLET ORAL at 09:46

## 2019-10-04 RX ADMIN — TOPIRAMATE SCH MG: 25 TABLET, FILM COATED ORAL at 09:46

## 2019-10-04 RX ADMIN — MONTELUKAST SODIUM SCH MG: 10 TABLET, COATED ORAL at 21:21

## 2019-10-04 RX ADMIN — HEPARIN SODIUM SCH: 5000 INJECTION, SOLUTION INTRAVENOUS; SUBCUTANEOUS at 21:27

## 2019-10-04 RX ADMIN — HEPARIN SODIUM SCH UNIT: 5000 INJECTION, SOLUTION INTRAVENOUS; SUBCUTANEOUS at 06:54

## 2019-10-04 RX ADMIN — MEROPENEM SCH MLS/HR: 1 INJECTION, POWDER, FOR SOLUTION INTRAVENOUS at 02:35

## 2019-10-04 RX ADMIN — ACETAMINOPHEN PRN MG: 10 INJECTION, SOLUTION INTRAVENOUS at 16:12

## 2019-10-04 RX ADMIN — HYDROXYUREA SCH MG: 500 CAPSULE ORAL at 09:45

## 2019-10-04 RX ADMIN — MEROPENEM SCH MLS/HR: 1 INJECTION, POWDER, FOR SOLUTION INTRAVENOUS at 09:44

## 2019-10-04 RX ADMIN — FLUTICASONE PROPIONATE SCH SPRAY: 50 SPRAY, METERED NASAL at 09:45

## 2019-10-04 RX ADMIN — DOCUSATE SODIUM,SENNOSIDES SCH: 50; 8.6 TABLET, FILM COATED ORAL at 21:27

## 2019-10-04 RX ADMIN — HYDROXYUREA SCH MG: 500 CAPSULE ORAL at 21:20

## 2019-10-04 RX ADMIN — LORATADINE SCH MG: 10 TABLET ORAL at 14:48

## 2019-10-04 RX ADMIN — FLUTICASONE PROPIONATE SCH SPRAY: 50 SPRAY, METERED NASAL at 21:29

## 2019-10-04 NOTE — PN
Progress Note, Physician


History of Present Illness: 





starting to feel much better


still pain in the left flank





- Current Medication List


Current Medications: 


Active Medications





Acetaminophen (Ofirmev Injection -)  1,000 mg IVPB Q6H PRN


   PRN Reason: PAIN LEVEL 1-5


   Last Admin: 10/03/19 23:23 Dose:  1,000 mg


Allopurinol (Zyloprim -)  300 mg PO DAILY [facility]


   Last Admin: 10/04/19 09:46 Dose:  300 mg


Fluticasone Propionate (Flonase -)  1 spray NS BID [facility]


   Last Admin: 10/04/19 09:45 Dose:  1 spray


Heparin Sodium (Porcine) (Heparin -)  5,000 unit SQ TID [facility]


   Last Admin: 10/04/19 06:54 Dose:  5,000 unit


Hydroxyurea (Hydrea -)  1,000 mg PO BID [facility]


   Last Admin: 10/04/19 09:45 Dose:  1,000 mg


Meropenem 1 gm/ Dextrose  100 mls @ 200 mls/hr IVPB Q8H-IV [facility]


   Last Admin: 10/04/19 09:44 Dose:  200 mls/hr


Loratadine (Claritin -)  10 mg PO DAILY [facility]


Lorazepam (Ativan -)  2 mg PO HS [facility]


   Last Admin: 10/03/19 21:24 Dose:  2 mg


Melatonin (Melatonin)  5 mg PO HS PRN


   PRN Reason: INSOMNIA


   Last Admin: 10/01/19 03:18 Dose:  5 mg


Montelukast Sodium (Singulair -)  10 mg PO HS [facility]


   Last Admin: 10/03/19 21:24 Dose:  10 mg


Morphine Sulfate (Morphine Sulfate)  4 mg IVPUSH Q6H PRN


   PRN Reason: PAIN LEVEL 6-10


   Last Admin: 10/04/19 03:40 Dose:  4 mg


Non-Formulary Medication (Telotristat Etiprate [Xermelo])  250 mg PO DAILY [facility]


Pantoprazole Sodium (Protonix -)  20 mg PO HS [facility]


   Last Admin: 10/03/19 21:24 Dose:  20 mg


Potassium Chloride (K-Dur -)  40 meq PO ONCE ONE


   Stop: 10/04/19 13:38


Senna/Docusate Sodium (Pericolace -)  1 tablet PO BID [facility]


   Last Admin: 10/04/19 09:46 Dose:  1 tablet


Topiramate (Topamax -)  50 mg PO DAILY [facility]


   Last Admin: 10/04/19 09:46 Dose:  50 mg











- Objective


Vital Signs: 


 Vital Signs











Temperature  99.1 F   10/04/19 13:54


 


Pulse Rate  83   10/04/19 13:54


 


Respiratory Rate  16   10/04/19 13:54


 


Blood Pressure  121/57 L  10/04/19 13:54


 


O2 Sat by Pulse Oximetry (%)  97   10/04/19 13:54











Constitutional: Yes: No Distress, Calm


Cardiovascular: Yes: S1, S2


Respiratory: Yes: Regular, CTA Bilaterally


Gastrointestinal: Yes: Normal Bowel Sounds, Soft, Tenderness (left upper 

quadrant)


Musculoskeletal: Yes: WNL


Extremities: Yes: WNL


Neurological: Yes: Alert, Oriented


Psychiatric: Yes: Alert, Oriented


Labs: 


 CBC, BMP





 10/04/19 11:30 





 10/04/19 11:30 











Assessment/Plan





63 year-old female with a PMH significant for myeloproliferative disorder (June 2019), rectal carcinoid, leukocytosis, GERD, migraines, recurrent UTIs, 

osteomyeltitis C4-C5 s/p C4-C5 discectomy and fusion (2017), and cholecystitis s

/p lap blanca (02/2019). Admitted for hyperleukocytosis and splenomegaly. 





Splenomegaly


Hyperleukocytosis


Myeloproliferative disorder


pneumonia


Rectal carcinoid


GERD


Migraines


Anxiety





plan


continue current mgmt


hydration


heam on board


abx


rest as per th eteam

## 2019-10-04 NOTE — PN
Physical Exam: 


SUBJECTIVE: Patient seen and examined at bedside. Left-sided pain is much 

improved. Has nasal congestion from allergies, requesting anti-histamine.








OBJECTIVE:





 Vital Signs











 Period  Temp  Pulse  Resp  BP Sys/Bethea  Pulse Ox


 


 Last 24 Hr  98.1 F-100.8 F  73-91  16-18  112-130/56-65  93-96











GENERAL: The patient is awake, alert, and fully oriented, in no acute distress.


LUNGS: Breath sounds equal, clear to auscultation 


HEART: Regular rate and rhythm, S1, S2 


ABDOMEN: Tenderness under left breast, left flank; splenic edge palpable


EXTREMITIES: 2+ pulses, warm, well-perfused, no edema. 


NEUROLOGICAL: Cranial nerves II through XII grossly intact. Normal speech.














 Laboratory Results - last 24 hr











  10/04/19 10/04/19





  11:30 11:30


 


WBC  63.8 H* 


 


RBC  3.23 L 


 


Hgb  11.2 


 


Hct  33.0 


 


MCV  101.9 H 


 


MCH  34.6 H 


 


MCHC  33.9 


 


RDW  19.5 H 


 


Plt Count  119 L 


 


MPV  7.5 


 


Absolute Neuts (auto)  61.3 


 


Neutrophils %  No Result Required. 


 


Lymphocytes %  No Result Required. 


 


Sodium   137


 


Potassium   3.3 L


 


Chloride   110 H


 


Carbon Dioxide   21


 


Anion Gap   6 L


 


BUN   15.0


 


Creatinine   0.8


 


Est GFR (CKD-EPI)AfAm   90.30


 


Est GFR (CKD-EPI)NonAf   77.91


 


Random Glucose   180 H


 


Calcium   8.0 L


 


Magnesium   1.8


 


Total Bilirubin   0.5


 


AST   44 H


 


ALT   49


 


Alkaline Phosphatase   505 H


 


Total Protein   5.8 L


 


Albumin   3.0 L








                  Active Medications











Generic Name Dose Route Start Last Admin





  Trade Name Freq  PRN Reason Stop Dose Admin


 


Acetaminophen  1,000 mg  10/03/19 17:37  10/03/19 23:23





  Ofirmev Injection -  IVPB   1,000 mg





  Q6H PRN   Administration





  PAIN LEVEL 1-5   





     





     





     


 


Allopurinol  300 mg  10/02/19 10:45  10/04/19 09:46





  Zyloprim -  PO   300 mg





  DAILY RAJ   Administration





     





     





     





     


 


Fluticasone Propionate  1 spray  10/01/19 10:00  10/04/19 09:45





  Flonase -  NS   1 spray





  BID RAJ   Administration





     





     





     





     


 


Heparin Sodium (Porcine)  5,000 unit  09/30/19 22:00  10/04/19 06:54





  Heparin -  SQ   5,000 unit





  TID RAJ   Administration





     





     





     





     


 


Hydroxyurea  1,000 mg  10/03/19 22:00  10/04/19 09:45





  Hydrea -  PO   1,000 mg





  BID RAJ   Administration





     





     





     





     


 


Meropenem 1 gm/ Dextrose  100 mls @ 200 mls/hr  10/03/19 10:00  10/04/19 09:44





  IVPB   200 mls/hr





  Q8H-IV RAJ   Administration





     





     





     





     


 


Loratadine  10 mg  10/04/19 13:00  





  Claritin -  PO   





  DAILY RAJ   





     





     





     





     


 


Lorazepam  2 mg  10/01/19 22:00  10/03/19 21:24





  Ativan -  PO   2 mg





  HS RAJ   Administration





     





     





     





     


 


Melatonin  5 mg  10/01/19 03:03  10/01/19 03:18





  Melatonin  PO   5 mg





  HS PRN   Administration





  INSOMNIA   





     





     





     


 


Montelukast Sodium  10 mg  09/30/19 22:00  10/03/19 21:24





  Singulair -  PO   10 mg





  HS RAJ   Administration





     





     





     





     


 


Morphine Sulfate  4 mg  10/03/19 16:43  10/04/19 03:40





  Morphine Sulfate  IVPUSH   4 mg





  Q6H PRN   Administration





  PAIN LEVEL 6-10   





     





     





     


 


Non-Formulary Medication  250 mg  10/01/19 10:00  





  Telotristat Etiprate [Xermelo]  PO   





  DAILY RAJ   





     





     





     





     


 


Pantoprazole Sodium  20 mg  09/30/19 22:00  10/03/19 21:24





  Protonix -  PO   20 mg





  HS RAJ   Administration





     





     





     





     


 


Senna/Docusate Sodium  1 tablet  10/03/19 10:00  10/04/19 09:46





  Pericolace -  PO   1 tablet





  BID RAJ   Administration





     





     





     





     


 


Topiramate  50 mg  10/02/19 10:45  10/04/19 09:46





  Topamax -  PO   50 mg





  DAILY RAJ   Administration





     





     





     





     











ASSESSMENT/PLAN


63 year-old female with a PMH significant for myeloproliferative disorder (June 2019), rectal carcinoid, leukocytosis, GERD, migraines, recurrent UTIs, 

osteomyeltitis C4-C5 s/p C4-C5 discectomy and fusion (2017), and cholecystitis s

/p lap blanca (02/2019). Admitted for hyperleukocytosis and splenomegaly. 





Splenomegaly


Hyperleukocytosis


Myeloproliferative disorder


   --since 3/10/19 spleen increase in size from [id number] to [id number]


   --WBC 105k on admission (40s in July 2019); started higher dose hydroxyurea 

1g q12h on 9/30, WBC 63.8k today; patient also reports significant clinical 

improvement today, pain is much better


   --repeat cxr today, assess for pulmonary congestion; consider restarting IV 

fluids


   --morphine, IV Tylenol for pain management   


   --accepted for transfer to [facility], pending bed assignment


   --Heme Dr. Londono following





Rule out pneumonia


   --Tm100.8 possible infiltrates on MRI on 9/2 and CXR on 9/3


   --cultures pending


   --continue meropenem (day #2)


   --ID following


   


Rectal carcinoid


   --continue Xermelo





GERD


   --continue protonix





Migraines


   --continue Topomax





Anxiety


   --continue lorazepam





FEN


   Fluids: PO intake adequate


   Electrolytes: replete as indicated


   Nutrition: regular diet





DVT prophylaxis: subq heparin





Dispo: continues to require inpatient care. Pending transfer to [facility]. 

Full code.














Visit type





- Emergency Visit


Emergency Visit: Yes


ED Registration Date: 09/30/19


Care time: The patient presented to the Emergency Department on the above date 

and was hospitalized for further evaluation of their emergent condition.





- New Patient


This patient is new to me today: No





- Critical Care


Critical Care patient: No

## 2019-10-05 LAB
ALBUMIN SERPL-MCNC: 3 G/DL (ref 3.4–5)
ALP SERPL-CCNC: 544 U/L (ref 45–117)
ALT SERPL-CCNC: 83 U/L (ref 13–61)
ANION GAP SERPL CALC-SCNC: 10 MMOL/L (ref 8–16)
AST SERPL-CCNC: 62 U/L (ref 15–37)
BASOPHILS # BLD: 0.7 % (ref 0–2)
BILIRUB SERPL-MCNC: 0.6 MG/DL (ref 0.2–1)
BUN SERPL-MCNC: 14 MG/DL (ref 7–18)
CALCIUM SERPL-MCNC: 8.5 MG/DL (ref 8.5–10)
CHLORIDE SERPL-SCNC: 107 MMOL/L (ref 98–107)
CO2 SERPL-SCNC: 23 MMOL/L (ref 21–32)
CREAT SERPL-MCNC: 0.6 MG/DL (ref 0.55–1.3)
DEPRECATED RDW RBC AUTO: 19.5 % (ref 11.6–15.6)
EOSINOPHIL # BLD: 1 % (ref 0–4.5)
GLUCOSE SERPL-MCNC: 147 MG/DL (ref 74–106)
HCT VFR BLD CALC: 32.1 % (ref 32.4–45.2)
HGB BLD-MCNC: 11.2 GM/DL (ref 10.7–15.3)
LYMPHOCYTES # BLD: 2.1 % (ref 8–40)
MAGNESIUM SERPL-MCNC: 1.9 MG/DL (ref 1.8–2.4)
MCH RBC QN AUTO: 35 PG (ref 25.7–33.7)
MCHC RBC AUTO-ENTMCNC: 34.8 G/DL (ref 32–36)
MCV RBC: 100.7 FL (ref 80–96)
MONOCYTES # BLD AUTO: 0.2 % (ref 3.8–10.2)
NEUTROPHILS # BLD: 96 % (ref 42.8–82.8)
PLATELET # BLD AUTO: 102 K/MM3 (ref 134–434)
PMV BLD: 7.4 FL (ref 7.5–11.1)
POTASSIUM SERPLBLD-SCNC: 4.1 MMOL/L (ref 3.5–5.1)
PROT SERPL-MCNC: 5.8 G/DL (ref 6.4–8.2)
RBC # BLD AUTO: 3.19 M/MM3 (ref 3.6–5.2)
SODIUM SERPL-SCNC: 140 MMOL/L (ref 136–145)
WBC # BLD AUTO: 59.1 K/MM3 (ref 4–10.8)

## 2019-10-05 RX ADMIN — MEROPENEM SCH MLS/HR: 1 INJECTION, POWDER, FOR SOLUTION INTRAVENOUS at 01:00

## 2019-10-05 RX ADMIN — FLUTICASONE PROPIONATE SCH SPRAY: 50 SPRAY, METERED NASAL at 22:02

## 2019-10-05 RX ADMIN — HYDROXYUREA SCH MG: 500 CAPSULE ORAL at 10:36

## 2019-10-05 RX ADMIN — HEPARIN SODIUM SCH: 5000 INJECTION, SOLUTION INTRAVENOUS; SUBCUTANEOUS at 22:00

## 2019-10-05 RX ADMIN — TOPIRAMATE SCH MG: 25 TABLET, FILM COATED ORAL at 10:37

## 2019-10-05 RX ADMIN — PANTOPRAZOLE SODIUM SCH: 20 TABLET, DELAYED RELEASE ORAL at 22:00

## 2019-10-05 RX ADMIN — PANTOPRAZOLE SODIUM SCH MG: 20 TABLET, DELAYED RELEASE ORAL at 18:52

## 2019-10-05 RX ADMIN — ALLOPURINOL SCH MG: 300 TABLET ORAL at 10:38

## 2019-10-05 RX ADMIN — HEPARIN SODIUM SCH UNIT: 5000 INJECTION, SOLUTION INTRAVENOUS; SUBCUTANEOUS at 15:00

## 2019-10-05 RX ADMIN — LORATADINE SCH MG: 10 TABLET ORAL at 10:38

## 2019-10-05 RX ADMIN — DOCUSATE SODIUM,SENNOSIDES SCH TABLET: 50; 8.6 TABLET, FILM COATED ORAL at 10:37

## 2019-10-05 RX ADMIN — MEROPENEM SCH MLS/HR: 1 INJECTION, POWDER, FOR SOLUTION INTRAVENOUS at 18:22

## 2019-10-05 RX ADMIN — FLUTICASONE PROPIONATE SCH SPRAY: 50 SPRAY, METERED NASAL at 10:38

## 2019-10-05 RX ADMIN — HYDROXYUREA SCH MG: 500 CAPSULE ORAL at 21:59

## 2019-10-05 RX ADMIN — MEROPENEM SCH MLS/HR: 1 INJECTION, POWDER, FOR SOLUTION INTRAVENOUS at 10:36

## 2019-10-05 RX ADMIN — MONTELUKAST SODIUM SCH MG: 10 TABLET, COATED ORAL at 22:02

## 2019-10-05 RX ADMIN — HEPARIN SODIUM SCH: 5000 INJECTION, SOLUTION INTRAVENOUS; SUBCUTANEOUS at 06:09

## 2019-10-05 NOTE — PN
Progress Note, Physician


Chief Complaint: 





Examined in bed. Pain to left upper quadrant remains


History of Present Illness: 


63 year-old female with a PMH significant for myeloproliferative disorder (June 2019), rectal carcinoid, leukocytosis, GERD, migraines, recurrent UTIs, 

osteomyeltitis C4-C5 s/p C4-C5 discectomy and fusion (2017), and cholecystitis s

/p lap blanca (02/2019). Admitted for hyperleukocytosis and splenomegaly. 














- Current Medication List


Current Medications: 


Active Medications





Acetaminophen (Ofirmev Injection -)  1,000 mg IVPB Q6H PRN


   PRN Reason: PAIN LEVEL 1-5


   Last Admin: 10/04/19 16:12 Dose:  1,000 mg


Allopurinol (Zyloprim -)  300 mg PO DAILY [facility]


   Last Admin: 10/04/19 09:46 Dose:  300 mg


Fluticasone Propionate (Flonase -)  1 spray NS BID [facility]


   Last Admin: 10/04/19 21:29 Dose:  1 spray


Heparin Sodium (Porcine) (Heparin -)  5,000 unit SQ TID [facility]


   Last Admin: 10/05/19 06:09 Dose:  Not Given


Hydroxyurea (Hydrea -)  1,000 mg PO BID [facility]


   Last Admin: 10/04/19 21:20 Dose:  1,000 mg


Meropenem 1 gm/ Dextrose  100 mls @ 200 mls/hr IVPB Q8H-IV RA[facility]


   Last Admin: 10/05/19 01:00 Dose:  200 mls/hr


Loratadine (Claritin -)  10 mg PO DAILY [facility]


   Last Admin: 10/04/19 14:48 Dose:  10 mg


Lorazepam (Ativan -)  2 mg PO HS [facility]


   Last Admin: 10/04/19 21:19 Dose:  2 mg


Melatonin (Melatonin)  5 mg PO HS PRN


   PRN Reason: INSOMNIA


   Last Admin: 10/01/19 03:18 Dose:  5 mg


Montelukast Sodium (Singulair -)  10 mg PO HS [facility]


   Last Admin: 10/04/19 21:21 Dose:  10 mg


Morphine Sulfate (Morphine Sulfate)  4 mg IVPUSH Q6H PRN


   PRN Reason: PAIN LEVEL 6-10


   Last Admin: 10/04/19 21:33 Dose:  4 mg


Non-Formulary Medication (Telotristat Etiprate [Xermelo])  250 mg PO DAILY [facility]


Pantoprazole Sodium (Protonix -)  20 mg PO HS [facility]


   Last Admin: 10/04/19 21:20 Dose:  20 mg


Senna/Docusate Sodium (Pericolace -)  1 tablet PO BID [facility]


   Last Admin: 10/04/19 21:27 Dose:  Not Given


Topiramate (Topamax -)  50 mg PO DAILY [facility]


   Last Admin: 10/04/19 09:46 Dose:  50 mg











- Objective


Vital Signs: 


 Vital Signs











Temperature  99.9 F H  10/05/19 05:00


 


Pulse Rate  72   10/05/19 05:00


 


Respiratory Rate  18   10/05/19 05:00


 


Blood Pressure  115/60   10/05/19 05:00


 


O2 Sat by Pulse Oximetry (%)  96   10/05/19 05:00











Constitutional: Yes: Well Nourished, No Distress, Calm


Eyes: Yes: WNL, Conjunctiva Clear


HENT: Yes: WNL, Atraumatic, Normocephalic


Neck: Yes: WNL, Supple, Trachea Midline


Cardiovascular: Yes: WNL, Regular Rate and Rhythm


Respiratory: Yes: Diminished (at bases)


Gastrointestinal: Yes: Normal Bowel Sounds, Soft, Splenomegaly, Tenderness (to 

left upper quad)


...Rectal Exam: Yes: Deferred


Genitourinary: Yes: WNL


Breast(s): Yes: WNL


Musculoskeletal: Yes: WNL


Extremities: Yes: WNL


Edema: Yes


Edema: LLE: 1+, RLE: 1+


Peripheral Pulses WNL: Yes


Peripheral Pulses: Left Radial: 2+, Right Radial: 2+, Left Doralis Pedis: 2+, 

Right Dorsalis Pedis: 2+, Left Femoral: 2+, Right Femoral: 2+


Integumentary: Yes: WNL


Neurological: Yes: WNL, Alert, Oriented


...Motor Strength: WNL


Psychiatric: Yes: WNL





- ....Imaging


Chest X-ray: Image Reviewed (BL effusion, atelectasis to left base)


Cat Scan: Report Reviewed (A/P: splenomegaly)





Problem List





- Problems


(1) Myeloproliferative neoplasm


Assessment/Plan: 


since 3/10/19 increased splenpomegaly


WBC 105k on admission (40s in July 2019), today 59


c/w hydroxyurea


Heme Dr. Londono following


accepted for transfer to [facility], pending bed 


Code(s): D47.1 - CHRONIC MYELOPROLIFERATIVE DISEASE   





(2) Prophylactic measure


Assessment/Plan: 


FEN


Fluids:adequate PO intake


Electrolytes: replete as indicated


Nutrition: regular diet





DVT prophylaxis


heparin





Dispo


maintain a inpatient


Full code.


Pending transfer to [facility],(Dr. Ulloa) waiting for bed assignment 


Code(s): Z29.9 - ENCOUNTER FOR PROPHYLACTIC MEASURES, UNSPECIFIED   





(3) Leukocytosis


Assessment/Plan: 


WBC 105k on admission, today 59


c/w hydroxyurea q12h


pending transfer [facility]


Code(s): D72.829 - ELEVATED WHITE BLOOD CELL COUNT, UNSPECIFIED   


Qualifiers: 


   Leukocytosis type: other   Qualified Code(s): D72.828 - Other elevated white 

blood cell count   





(4) Migraines


Assessment/Plan: 


c/w topimax


Code(s): G43.909 - MIGRAINE, UNSP, NOT INTRACTABLE, WITHOUT STATUS MIGRAINOSUS 

  





(5) Osteomyelitis


Code(s): M86.9 - OSTEOMYELITIS, UNSPECIFIED   


Qualifiers: 


   Osteomyelitis type: subacute   Osteomyelitis location: other site   

Qualified Code(s): M86.28 - Subacute osteomyelitis, other site   





(6) Rectal carcinoid tumor


Code(s): D3A.026 - BENIGN CARCINOID TUMOR OF THE RECTUM   





(7) Transaminitis


Assessment/Plan: 


LFTs elevated


no hepatomegaly reported on CT


avoid hepatotoxic agents, will limit amount of tylenol for pain


tramadol for pain


monitor LFTs


if cont to trend up with order u/s


Code(s): R74.0 - NONSPEC ELEV OF LEVELS OF TRANSAMNS & LACTIC ACID DEHYDRGNSE   





Visit type





- Emergency Visit


Emergency Visit: Yes


ED Registration Date: 09/30/19


Care time: The patient presented to the Emergency Department on the above date 

and was hospitalized for further evaluation of their emergent condition.





- New Patient


This patient is new to me today: Yes


Date on this admission: 10/05/19





- Critical Care


Critical Care patient: No





- Discharge Referral


Referred to [facility] No

## 2019-10-05 NOTE — CON.ID
Consult


Consult Specialty:: infectious diseases


Referred by:: [email or web address]


Reason for Consultation:: weakness,fever,sob





- History of Present Illness


Chief Complaint: fever,weakness abd pain


History of Present Illness: 





63 year-old female with a PMH significant for myeloproliferative neoplasm (2019), rectal carcinoid, leukocytosis, GERD, migraines, recurrent UTIs, 

osteomyeltitis C4-C5 s/p C4-C5 discectomy and fusion (), and cholecystitis s

/p lap blanca (2019). Presented to the ED with left rib/flank pain x 24 

hours. Pt was in her USOH, last night developed gradual onset mild left lower 

flank/rib pain, did not take anything and slept throughout the night but awoke 

this morning with increased pain, positional and worse with deep inspiration. 

The pain radiates to the left shoulder. Denies fever, sweats, chills; denies 

nausea, vomiting, diarrhea; denies dysuria, urgency, frequency. Last BM 2days 

ago, normal. 


was called in because patient became sob and fever


currently patient does nto feel too good and is c/o of abd pain








- History Source


History Provided By: Patient


Limitations to Obtaining History: No Limitations





- Past Medical History


Pulmonary: Yes: Other (recent URI (3wks ago, Z-willem finished around 2/10))


Gastrointestinal: Yes: Cancer (rectal carcinoid), GERD, Hiatal Hernia, Other (

large rectal carcinoid dx'ed , never resected, had RT & chemorx, has liver 

lesions, on octreotide, diarrhea managed with Xermelo )


Infectious Disease: Yes: Other (Osteomyelitis of C4-C5)


Musculoskeletal: Yes: Osteoarthritis, Other (spinal stenosis, C4-C5 fusion 

after osteomyelitis)


Endocrine: Yes: Other (thyroid nodule)





- Past Surgical History


Past Surgical History: Yes: Arthrosocopy (left knee), Cholecystectomy, 

Colonoscopy,  (x 2), Tonsillectomy, Upper Endoscopy





- Alcohol/Substance Use


Hx Alcohol Use: No


History of Substance Use: reports: Marijuana (few times a month)





- Smoking History


Smoking history: Never smoked


Have you smoked in the past 12 months: No





- Social History


Usual Living Arrangement: With Spouse


ADL: Independent


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


History of Recent Travel: No





Home Medications





- Allergies


Allergies/Adverse Reactions: 


 Allergies











Allergy/AdvReac Type Severity Reaction Status Date / Time


 


everolimus [From Afinitor] AdvReac Severe SOB, Verified 19 14:40





   Myalgias,  





   Arthralgias,Edema  


 


erythromycin base AdvReac   Verified 19 14:40





[Erythromycin Base]     














- Home Medications


Home Medications: 


Ambulatory Orders





Octreotide Acetate [Sandostatin] 30 mcg IJ MONTHLY  ampul 17 


Lipase/Protease/Amylase [Creon Dr 24,000 Units Capsule] 4 each PO TID 03/10/19 


Hydroxyurea [Hydrea 500Mg Capsule -] 500 mg PO DAILY 19 


Acetaminophen [Tylenol .Regular Strength -] 650 mg PO Q6H PRN  tablet 10/03/19 


Allopurinol [Zyloprim -] 300 mg PO DAILY  tablet 10/03/19 


Fluticasone Prop 0.05% Nasal [Flonase -] 1 spray NS BID  spray 10/03/19 


Heparin - 5,000 unit SQ TID  vial 10/03/19 


Hydroxyurea [Hydrea 500Mg Capsule -] 1,000 mg PO BID  capsule 10/03/19 


LORazepam [Ativan] 2 mg PO HS #2 tablet MDD 1 10/03/19 


Melatonin 5 mg PO HS PRN  tab 10/03/19 


Meropenem [Merrem (Restricted To Id) -] 1 gm IVPB Q8H-IV  vial 10/03/19 


Montelukast Na [Singulair -] 10 mg PO HS  tablet 10/03/19 


Morphine Sulfate 4 mg IVPUSH Q6H PRN #1 vial MDD 4 10/03/19 


Pantoprazole Sodium [Protonix -] 20 mg PO HS  tablet.ec 10/03/19 


Sennosides/Docusate Sodium [Pericolace -] 1 tablet PO BID  tablet 10/03/19 


Telotristat Etiprate [Xermelo] 250 mg PO DAILY #30 tab 10/03/19 


Topiramate [Topamax -] 50 mg PO DAILY  tablet 10/03/19 











Review of Systems





- Review of Systems


Constitutional: reports: Fever


Eyes: reports: No Symptoms


HENT: reports: No Symptoms


Neck: reports: No Symptoms


Cardiovascular: reports: No Symptoms


Respiratory: reports: SOB, SOB on Exertion


Gastrointestinal: reports: Abdominal Pain


Genitourinary: reports: No Symptoms


Musculoskeletal: reports: No Symptoms


Integumentary: reports: No Symptoms


Neurological: reports: No Symptoms


Endocrine: reports: No Symptoms


Hematology/Lymphatic: reports: No Symptoms


Psychiatric: reports: No Symptoms





Physical Exam


Vital Signs: 


 Vital Signs











Temperature  99.9 F H  10/05/19 05:00


 


Pulse Rate  72   10/05/19 05:00


 


Respiratory Rate  18   10/05/19 05:00


 


Blood Pressure  115/60   10/05/19 05:00


 


O2 Sat by Pulse Oximetry (%)  96   10/05/19 05:00











Constitutional: Yes: Well Nourished, Calm, Mild Distress


Cardiovascular: Yes: Regular Rate and Rhythm


Respiratory: Yes: Regular, On Nasal O2, Poor Air Entry


Gastrointestinal: Yes: Normal Bowel Sounds, Soft, Tenderness (ruq)


Musculoskeletal: Yes: WNL


Extremities: Yes: WNL


Neurological: Yes: Alert, Oriented


Psychiatric: Yes: Alert, Oriented


Labs: 


 CBC, BMP





 10/05/19 07:42 





 10/05/19 07:42 











Imaging





- Results


Chest X-ray: Report Reviewed, Image Reviewed


Cat Scan: Report Reviewed, Image Reviewed





Assessment/Plan





63 year-old female with a PMH significant for myeloproliferative disorder (2019), rectal carcinoid, leukocytosis, GERD, migraines, recurrent UTIs, 

osteomyeltitis C4-C5 s/p C4-C5 discectomy and fusion (), and cholecystitis s

/p lap blanca (2019). Admitted for hyperleukocytosis and splenomegaly. 





Splenomegaly


Hyperleukocytosis


Myeloproliferative disorder


pneumonia


Rectal carcinoid


GERD


Migraines


Anxiety





patient is very immunocompromised


and also now with sob


i am going to initiate abx after looking at the findings


incentive jozef


monitor for fever


monitor for wbc


very close watch on the patient


rest as per team


heam to see the patient

## 2019-10-05 NOTE — PN
Progress Note, Physician


History of Present Illness: 





does not feel so good


wbc trending down


still with pain 


says spasms which come and go





- Current Medication List


Current Medications: 


Active Medications





Acetaminophen (Ofirmev Injection -)  1,000 mg IVPB Q6H PRN


   PRN Reason: PAIN LEVEL 1-5


   Last Admin: 10/04/19 16:12 Dose:  1,000 mg


Allopurinol (Zyloprim -)  300 mg PO DAILY [facility]


   Last Admin: 10/04/19 09:46 Dose:  300 mg


Fluticasone Propionate (Flonase -)  1 spray NS BID [facility]


   Last Admin: 10/04/19 21:29 Dose:  1 spray


Heparin Sodium (Porcine) (Heparin -)  5,000 unit SQ TID [facility]


   Last Admin: 10/05/19 06:09 Dose:  Not Given


Hydroxyurea (Hydrea -)  1,000 mg PO BID [facility]


   Last Admin: 10/04/19 21:20 Dose:  1,000 mg


Meropenem 1 gm/ Dextrose  100 mls @ 200 mls/hr IVPB Q8H-IV [facility]


   Last Admin: 10/05/19 01:00 Dose:  200 mls/hr


Loratadine (Claritin -)  10 mg PO DAILY [facility]


   Last Admin: 10/04/19 14:48 Dose:  10 mg


Lorazepam (Ativan -)  2 mg PO HS [facility]


   Last Admin: 10/04/19 21:19 Dose:  2 mg


Melatonin (Melatonin)  5 mg PO HS PRN


   PRN Reason: INSOMNIA


   Last Admin: 10/01/19 03:18 Dose:  5 mg


Montelukast Sodium (Singulair -)  10 mg PO HS [facility]


   Last Admin: 10/04/19 21:21 Dose:  10 mg


Morphine Sulfate (Morphine Sulfate)  4 mg IVPUSH Q6H PRN


   PRN Reason: PAIN LEVEL 6-10


   Last Admin: 10/04/19 21:33 Dose:  4 mg


Non-Formulary Medication (Telotristat Etiprate [Xermelo])  250 mg PO DAILY [facility]


Pantoprazole Sodium (Protonix -)  20 mg PO HS [facility]


   Last Admin: 10/04/19 21:20 Dose:  20 mg


Senna/Docusate Sodium (Pericolace -)  1 tablet PO BID [facility]


   Last Admin: 10/04/19 21:27 Dose:  Not Given


Topiramate (Topamax -)  50 mg PO DAILY [facility]


   Last Admin: 10/04/19 09:46 Dose:  50 mg


Tramadol HCl (Ultram -)  50 mg PO Q6H PRN


   PRN Reason: PAIN LEVEL 6-10











- Objective


Vital Signs: 


 Vital Signs











Temperature  99.9 F H  10/05/19 05:00


 


Pulse Rate  72   10/05/19 05:00


 


Respiratory Rate  18   10/05/19 05:00


 


Blood Pressure  115/60   10/05/19 05:00


 


O2 Sat by Pulse Oximetry (%)  96   10/05/19 05:00











Constitutional: Yes: Calm, Mild Distress


Cardiovascular: Yes: Regular Rate and Rhythm


Respiratory: Yes: Regular, CTA Bilaterally


Gastrointestinal: Yes: Normal Bowel Sounds, Soft, Tenderness (ruq)


Musculoskeletal: Yes: WNL


Extremities: Yes: WNL


Neurological: Yes: Alert, Oriented


Psychiatric: Yes: Alert, Oriented


Labs: 


 CBC, BMP





 10/05/19 07:42 





 10/05/19 07:42 











Assessment/Plan





63 year-old female with a PMH significant for myeloproliferative disorder (June 2019), rectal carcinoid, leukocytosis, GERD, migraines, recurrent UTIs, 

osteomyeltitis C4-C5 s/p C4-C5 discectomy and fusion (2017), and cholecystitis s

/p lap blanca (02/2019). Admitted for hyperleukocytosis and splenomegaly. 





Splenomegaly


Hyperleukocytosis


Myeloproliferative disorder


pneumonia


Rectal carcinoid


GERD


Migraines


Anxiety





plan


continue abx


wbc trending down


incentive jozef


rest as per the team

## 2019-10-06 LAB
ALBUMIN SERPL-MCNC: 2.9 G/DL (ref 3.4–5)
ALP SERPL-CCNC: 526 U/L (ref 45–117)
ALT SERPL-CCNC: 69 U/L (ref 13–61)
ANION GAP SERPL CALC-SCNC: 12 MMOL/L (ref 8–16)
ANISOCYTOSIS BLD QL: (no result)
AST SERPL-CCNC: 47 U/L (ref 15–37)
BILIRUB SERPL-MCNC: 0.4 MG/DL (ref 0.2–1)
BUN SERPL-MCNC: 18 MG/DL (ref 7–18)
CALCIUM SERPL-MCNC: 8.2 MG/DL (ref 8.5–10)
CHLORIDE SERPL-SCNC: 105 MMOL/L (ref 98–107)
CO2 SERPL-SCNC: 20 MMOL/L (ref 21–32)
CREAT SERPL-MCNC: 0.7 MG/DL (ref 0.55–1.3)
DEPRECATED RDW RBC AUTO: 19.3 % (ref 11.6–15.6)
GLUCOSE SERPL-MCNC: 107 MG/DL (ref 74–106)
HCT VFR BLD CALC: 32 % (ref 32.4–45.2)
HGB BLD-MCNC: 10.8 GM/DL (ref 10.7–15.3)
MACROCYTES BLD QL: (no result)
MAGNESIUM SERPL-MCNC: 2.1 MG/DL (ref 1.8–2.4)
MCH RBC QN AUTO: 34.6 PG (ref 25.7–33.7)
MCHC RBC AUTO-ENTMCNC: 33.8 G/DL (ref 32–36)
MCV RBC: 102.5 FL (ref 80–96)
PLATELET # BLD AUTO: 82 K/MM3 (ref 134–434)
PLATELET BLD QL SMEAR: (no result)
PMV BLD: 8 FL (ref 7.5–11.1)
POTASSIUM SERPLBLD-SCNC: 4 MMOL/L (ref 3.5–5.1)
PROT SERPL-MCNC: 6 G/DL (ref 6.4–8.2)
RBC # BLD AUTO: 3.12 M/MM3 (ref 3.6–5.2)
RBC MORPH BLD: YES
SODIUM SERPL-SCNC: 137 MMOL/L (ref 136–145)
WBC # BLD AUTO: 45.8 K/MM3 (ref 4–10.8)

## 2019-10-06 RX ADMIN — HYDROXYUREA SCH MG: 500 CAPSULE ORAL at 09:24

## 2019-10-06 RX ADMIN — FLUTICASONE PROPIONATE SCH SPRAY: 50 SPRAY, METERED NASAL at 21:16

## 2019-10-06 RX ADMIN — LORATADINE SCH MG: 10 TABLET ORAL at 09:25

## 2019-10-06 RX ADMIN — MONTELUKAST SODIUM SCH MG: 10 TABLET, COATED ORAL at 21:18

## 2019-10-06 RX ADMIN — HEPARIN SODIUM SCH UNIT: 5000 INJECTION, SOLUTION INTRAVENOUS; SUBCUTANEOUS at 14:08

## 2019-10-06 RX ADMIN — HEPARIN SODIUM SCH: 5000 INJECTION, SOLUTION INTRAVENOUS; SUBCUTANEOUS at 21:18

## 2019-10-06 RX ADMIN — HYDROXYUREA SCH MG: 500 CAPSULE ORAL at 21:18

## 2019-10-06 RX ADMIN — MEROPENEM SCH MLS/HR: 1 INJECTION, POWDER, FOR SOLUTION INTRAVENOUS at 18:11

## 2019-10-06 RX ADMIN — MEROPENEM SCH MLS/HR: 1 INJECTION, POWDER, FOR SOLUTION INTRAVENOUS at 09:24

## 2019-10-06 RX ADMIN — HEPARIN SODIUM SCH: 5000 INJECTION, SOLUTION INTRAVENOUS; SUBCUTANEOUS at 06:24

## 2019-10-06 RX ADMIN — FLUTICASONE PROPIONATE SCH SPRAY: 50 SPRAY, METERED NASAL at 09:25

## 2019-10-06 RX ADMIN — MEROPENEM SCH MLS/HR: 1 INJECTION, POWDER, FOR SOLUTION INTRAVENOUS at 01:30

## 2019-10-06 RX ADMIN — ALLOPURINOL SCH MG: 300 TABLET ORAL at 09:25

## 2019-10-06 RX ADMIN — PANTOPRAZOLE SODIUM SCH MG: 20 TABLET, DELAYED RELEASE ORAL at 21:18

## 2019-10-06 RX ADMIN — TOPIRAMATE SCH MG: 25 TABLET, FILM COATED ORAL at 09:24

## 2019-10-06 NOTE — PN
Progress Note, Physician


History of Present Illness: 





stable


no new issues


feels better





- Current Medication List


Current Medications: 


Active Medications





Acetaminophen (Ofirmev Injection -)  1,000 mg IVPB Q6H PRN


   PRN Reason: PAIN LEVEL 1-5


   Last Admin: 10/04/19 16:12 Dose:  1,000 mg


Allopurinol (Zyloprim -)  300 mg PO DAILY [facility]


   Last Admin: 10/06/19 09:25 Dose:  300 mg


Diphenoxylate HCl/Atropine (Lomotil -)  1 combo PO Q8H PRN


   PRN Reason: DIARRHEA


   Last Admin: 10/05/19 22:04 Dose:  1 combo


Fluticasone Propionate (Flonase -)  1 spray NS BID [facility]


   Last Admin: 10/06/19 09:25 Dose:  1 spray


Heparin Sodium (Porcine) (Heparin -)  5,000 unit SQ TID [facility]


   Last Admin: 10/06/19 06:24 Dose:  Not Given


Hydroxyurea (Hydrea -)  1,000 mg PO BID [facility]


   Last Admin: 10/06/19 09:24 Dose:  1,000 mg


Meropenem 1 gm/ Dextrose  100 mls @ 200 mls/hr IVPB Q8H-IV [facility]


   Last Admin: 10/06/19 09:24 Dose:  200 mls/hr


Loratadine (Claritin -)  10 mg PO DAILY [facility]


   Last Admin: 10/06/19 09:25 Dose:  10 mg


Lorazepam (Ativan -)  2 mg PO HS [facility]


   Last Admin: 10/05/19 21:59 Dose:  2 mg


Melatonin (Melatonin)  5 mg PO HS PRN


   PRN Reason: INSOMNIA


   Last Admin: 10/01/19 03:18 Dose:  5 mg


Montelukast Sodium (Singulair -)  10 mg PO HS [facility]


   Last Admin: 10/05/19 22:02 Dose:  10 mg


Morphine Sulfate (Morphine Sulfate)  4 mg IVPUSH Q6H PRN


   PRN Reason: PAIN LEVEL 6-10


   Last Admin: 10/06/19 01:31 Dose:  4 mg


Non-Formulary Medication (Telotristat Etiprate [Xermelo])  250 mg PO DAILY [facility]


Pantoprazole Sodium (Protonix -)  20 mg PO HS [facility]


   Last Admin: 10/05/19 22:00 Dose:  Not Given


Topiramate (Topamax -)  50 mg PO DAILY [facility]


   Last Admin: 10/06/19 09:24 Dose:  50 mg


Tramadol HCl (Ultram -)  50 mg PO Q6H PRN


   PRN Reason: PAIN LEVEL 6-10


   Stop: 10/08/19 12:07











- Objective


Vital Signs: 


 Vital Signs











Temperature  98.5 F   10/06/19 06:00


 


Pulse Rate  74   10/06/19 06:00


 


Respiratory Rate  19   10/06/19 06:00


 


Blood Pressure  114/60   10/06/19 06:00


 


O2 Sat by Pulse Oximetry (%)  96   10/06/19 08:24











Constitutional: Yes: No Distress, Calm


Cardiovascular: Yes: S1, S2


Respiratory: Yes: Regular, CTA Bilaterally


Gastrointestinal: Yes: Normal Bowel Sounds, Soft, Tenderness (ruq)


Musculoskeletal: Yes: WNL


Extremities: Yes: WNL


Neurological: Yes: Alert, Oriented


Psychiatric: Yes: Alert, Oriented


Labs: 


 CBC, BMP





 10/06/19 06:15 





 10/06/19 06:15 











Assessment/Plan





63 year-old female with a PMH significant for myeloproliferative disorder (June 2019), rectal carcinoid, leukocytosis, GERD, migraines, recurrent UTIs, 

osteomyeltitis C4-C5 s/p C4-C5 discectomy and fusion (2017), and cholecystitis s

/p lap blanca (02/2019). Admitted for hyperleukocytosis and splenomegaly. 





Splenomegaly


Hyperleukocytosis


Myeloproliferative disorder


pneumonia


Rectal carcinoid


GERD


Migraines


Anxiety





plan


continue abx


wbc trending down


incentive jozef


rest as per the team

## 2019-10-06 NOTE — PN
Progress Note, Physician


Chief Complaint: 





Left upper quad pain less today, did need MSO4 x 1 dose last night


History of Present Illness: 


63 year-old female with a PMH significant for myeloproliferative disorder (June 2019), rectal carcinoid, leukocytosis, GERD, migraines, recurrent UTIs, 

osteomyeltitis C4-C5 s/p C4-C5 discectomy and fusion (2017), and cholecystitis s

/p lap blanca (02/2019). Admitted for hyperleukocytosis and splenomegaly. 














- Current Medication List


Current Medications: 


Active Medications





Acetaminophen (Ofirmev Injection -)  1,000 mg IVPB Q6H PRN


   PRN Reason: PAIN LEVEL 1-5


   Last Admin: 10/04/19 16:12 Dose:  1,000 mg


Allopurinol (Zyloprim -)  300 mg PO DAILY [facility]


   Last Admin: 10/05/19 10:38 Dose:  300 mg


Diphenoxylate HCl/Atropine (Lomotil -)  1 combo PO Q8H PRN


   PRN Reason: DIARRHEA


   Last Admin: 10/05/19 22:04 Dose:  1 combo


Fluticasone Propionate (Flonase -)  1 spray NS BID [facility]


   Last Admin: 10/05/19 22:02 Dose:  1 spray


Heparin Sodium (Porcine) (Heparin -)  5,000 unit SQ TID [facility]


   Last Admin: 10/06/19 06:24 Dose:  Not Given


Hydroxyurea (Hydrea -)  1,000 mg PO BID [facility]


   Last Admin: 10/05/19 21:59 Dose:  1,000 mg


Meropenem 1 gm/ Dextrose  100 mls @ 200 mls/hr IVPB Q8H-IV [facility]


   Last Admin: 10/06/19 01:30 Dose:  200 mls/hr


Loratadine (Claritin -)  10 mg PO DAILY [facility]


   Last Admin: 10/05/19 10:38 Dose:  10 mg


Lorazepam (Ativan -)  2 mg PO HS [facility]


   Last Admin: 10/05/19 21:59 Dose:  2 mg


Melatonin (Melatonin)  5 mg PO HS PRN


   PRN Reason: INSOMNIA


   Last Admin: 10/01/19 03:18 Dose:  5 mg


Montelukast Sodium (Singulair -)  10 mg PO HS [facility]


   Last Admin: 10/05/19 22:02 Dose:  10 mg


Morphine Sulfate (Morphine Sulfate)  4 mg IVPUSH Q6H PRN


   PRN Reason: PAIN LEVEL 6-10


   Last Admin: 10/06/19 01:31 Dose:  4 mg


Non-Formulary Medication (Telotristat Etiprate [Xermelo])  250 mg PO DAILY [facility]


Pantoprazole Sodium (Protonix -)  20 mg PO HS [facility]


   Last Admin: 10/05/19 22:00 Dose:  Not Given


Topiramate (Topamax -)  50 mg PO DAILY [facility]


   Last Admin: 10/05/19 10:37 Dose:  50 mg


Tramadol HCl (Ultram -)  50 mg PO Q6H PRN


   PRN Reason: PAIN LEVEL 6-10


   Stop: 10/08/19 12:07











- Objective


Vital Signs: 


 Vital Signs











Temperature  98.5 F   10/06/19 06:00


 


Pulse Rate  74   10/06/19 06:00


 


Respiratory Rate  19   10/06/19 06:00


 


Blood Pressure  114/60   10/06/19 06:00


 


O2 Sat by Pulse Oximetry (%)  96   10/06/19 06:00











Constitutional: Yes: Well Nourished, No Distress, Calm


Eyes: Yes: WNL, Conjunctiva Clear


HENT: Yes: WNL, Atraumatic, Normocephalic


Neck: Yes: WNL, Supple, Trachea Midline


Cardiovascular: Yes: WNL, Regular Rate and Rhythm


Respiratory: Yes: WNL, Regular, CTA Bilaterally


Gastrointestinal: Yes: Normal Bowel Sounds, Soft, Tenderness (left upper quad)


...Rectal Exam: Yes: Deferred


Genitourinary: Yes: WNL


Musculoskeletal: Yes: WNL


Extremities: Yes: WNL


Edema: Yes


Edema: LLE: 1+, RLE: 1+


Peripheral Pulses WNL: Yes


Peripheral Pulses: Left Radial: 2+, Right Radial: 2+, Left Doralis Pedis: 2+, 

Right Dorsalis Pedis: 2+, Left Femoral: 2+, Right Femoral: 2+


Integumentary: Yes: WNL, Other (ecchymotic area to left lower inner arm)


Neurological: Yes: WNL, Alert, Oriented


...Motor Strength: WNL


Psychiatric: Yes: WNL


Labs: 


 CBC, BMP





 10/05/19 07:42 





 10/05/19 07:42 











- ....Imaging


Chest X-ray: Report Reviewed, Image Reviewed (BL effusion, atelectasis to left 

base)


Cat Scan: Report Reviewed (A/P: splenomegaly)





Problem List





- Problems


(1) Myeloproliferative neoplasm


Assessment/Plan: 


CT scan since 3/10/19 with  increased splenpomegaly


WBC 105k on admission (40s in July 2019), today 45


c/w hydroxyurea @ 1000mg bid


Heme Dr. Londono following


pt does not wish to be transferred to [facility] any longer


Problems reviewed: Yes   


Code(s): D47.1 - CHRONIC MYELOPROLIFERATIVE DISEASE   





(2) Prophylactic measure


Assessment/Plan: 


FEN


Fluids:adequate PO intake


Electrolytes: replete as indicated


Nutrition: regular diet





DVT prophylaxis


heparin





Dispo


maintain a inpatient


Full code.


discharge planning to home


Problems reviewed: Yes   


Code(s): Z29.9 - ENCOUNTER FOR PROPHYLACTIC MEASURES, UNSPECIFIED   





(3) Leukocytosis


Assessment/Plan: 


WBC 105k on admission, today 45


c/w hydroxyurea q12h





Problems reviewed: Yes   


Code(s): D72.829 - ELEVATED WHITE BLOOD CELL COUNT, UNSPECIFIED   


Qualifiers: 


   Leukocytosis type: other   Qualified Code(s): D72.828 - Other elevated white 

blood cell count   





(4) Migraines


Assessment/Plan: 


c/w topimax


Problems reviewed: Yes   


Code(s): G43.909 - MIGRAINE, UNSP, NOT INTRACTABLE, WITHOUT STATUS MIGRAINOSUS 

  





(5) Osteomyelitis


Problems reviewed: Yes   


Code(s): M86.9 - OSTEOMYELITIS, UNSPECIFIED   


Qualifiers: 


   Osteomyelitis type: subacute   Osteomyelitis location: other site   

Qualified Code(s): M86.28 - Subacute osteomyelitis, other site   





(6) Rectal carcinoid tumor


Problems reviewed: Yes   


Code(s): D3A.026 - BENIGN CARCINOID TUMOR OF THE RECTUM   





(7) Transaminitis


Assessment/Plan: 


LFTs trending down


no hepatomegaly reported on CT


avoid hepatotoxic agents, will limit amount of tylenol for pain


tramadol for pain


monitor LFTs





Code(s): R74.0 - NONSPEC ELEV OF LEVELS OF TRANSAMNS & LACTIC ACID DEHYDRGNSE   





Visit type





- Emergency Visit


Emergency Visit: Yes


ED Registration Date: 09/30/19


Care time: The patient presented to the Emergency Department on the above date 

and was hospitalized for further evaluation of their emergent condition.





- New Patient


This patient is new to me today: No





- Critical Care


Critical Care patient: No





- Discharge Referral


Referred to [facility] No

## 2019-10-07 VITALS — SYSTOLIC BLOOD PRESSURE: 120 MMHG | DIASTOLIC BLOOD PRESSURE: 57 MMHG | HEART RATE: 93 BPM | TEMPERATURE: 98.9 F

## 2019-10-07 LAB
ALBUMIN SERPL-MCNC: 3 G/DL (ref 3.4–5)
ALP SERPL-CCNC: 481 U/L (ref 45–117)
ALT SERPL-CCNC: 55 U/L (ref 13–61)
ANION GAP SERPL CALC-SCNC: 8 MMOL/L (ref 8–16)
AST SERPL-CCNC: 28 U/L (ref 15–37)
BASOPHILS # BLD: 0.8 % (ref 0–2)
BILIRUB SERPL-MCNC: 0.5 MG/DL (ref 0.2–1)
BUN SERPL-MCNC: 21 MG/DL (ref 7–18)
CALCIUM SERPL-MCNC: 8.3 MG/DL (ref 8.5–10)
CHLORIDE SERPL-SCNC: 106 MMOL/L (ref 98–107)
CO2 SERPL-SCNC: 23 MMOL/L (ref 21–32)
CREAT SERPL-MCNC: 0.6 MG/DL (ref 0.55–1.3)
DEPRECATED RDW RBC AUTO: 19.7 % (ref 11.6–15.6)
EOSINOPHIL # BLD: 1.1 % (ref 0–4.5)
GLUCOSE SERPL-MCNC: 151 MG/DL (ref 74–106)
HCT VFR BLD CALC: 32 % (ref 32.4–45.2)
HGB BLD-MCNC: 10.7 GM/DL (ref 10.7–15.3)
LYMPHOCYTES # BLD: 2.4 % (ref 8–40)
MAGNESIUM SERPL-MCNC: 2 MG/DL (ref 1.8–2.4)
MCH RBC QN AUTO: 34.2 PG (ref 25.7–33.7)
MCHC RBC AUTO-ENTMCNC: 33.4 G/DL (ref 32–36)
MCV RBC: 102.5 FL (ref 80–96)
MONOCYTES # BLD AUTO: 0.4 % (ref 3.8–10.2)
NEUTROPHILS # BLD: 95.3 % (ref 42.8–82.8)
PLATELET # BLD AUTO: 110 K/MM3 (ref 134–434)
PMV BLD: 7.5 FL (ref 7.5–11.1)
POTASSIUM SERPLBLD-SCNC: 4.2 MMOL/L (ref 3.5–5.1)
PROT SERPL-MCNC: 5.9 G/DL (ref 6.4–8.2)
RBC # BLD AUTO: 3.12 M/MM3 (ref 3.6–5.2)
SODIUM SERPL-SCNC: 137 MMOL/L (ref 136–145)
WBC # BLD AUTO: 37.6 K/MM3 (ref 4–10.8)

## 2019-10-07 RX ADMIN — FLUTICASONE PROPIONATE SCH SPRAY: 50 SPRAY, METERED NASAL at 09:06

## 2019-10-07 RX ADMIN — MEROPENEM SCH MLS/HR: 1 INJECTION, POWDER, FOR SOLUTION INTRAVENOUS at 01:22

## 2019-10-07 RX ADMIN — MEROPENEM SCH MLS/HR: 1 INJECTION, POWDER, FOR SOLUTION INTRAVENOUS at 09:05

## 2019-10-07 RX ADMIN — LORATADINE SCH MG: 10 TABLET ORAL at 09:06

## 2019-10-07 RX ADMIN — HEPARIN SODIUM SCH: 5000 INJECTION, SOLUTION INTRAVENOUS; SUBCUTANEOUS at 05:56

## 2019-10-07 RX ADMIN — ALLOPURINOL SCH MG: 300 TABLET ORAL at 09:06

## 2019-10-07 RX ADMIN — DIPHENOXYLATE HYDROCHLORIDE AND ATROPINE SULFATE PRN COMBO: 2.5; .025 TABLET ORAL at 10:49

## 2019-10-07 RX ADMIN — TOPIRAMATE SCH MG: 25 TABLET, FILM COATED ORAL at 09:06

## 2019-10-07 RX ADMIN — DIPHENOXYLATE HYDROCHLORIDE AND ATROPINE SULFATE PRN COMBO: 2.5; .025 TABLET ORAL at 13:12

## 2019-10-07 RX ADMIN — HEPARIN SODIUM SCH: 5000 INJECTION, SOLUTION INTRAVENOUS; SUBCUTANEOUS at 14:04

## 2019-10-07 NOTE — DS
Physical Exam: 


SUBJECTIVE: Patient seen and examined. Pain is minimal. Feels ready to go home.








OBJECTIVE:





 Vital Signs











 Period  Temp  Pulse  Resp  BP Sys/Bethea  Pulse Ox


 


 Last 24 Hr  98.3 F-98.8 F  78-92  18-18  105-120/53-56  94-98








PHYSICAL EXAM





GENERAL: The patient is awake, alert, and fully oriented, in no acute distress.


LUNGS: Breath sounds equal, clear to auscultation 


HEART: Regular rate and rhythm, S1, S2 


ABDOMEN: Tenderness under left breast, left flank; splenic edge palpable


EXTREMITIES: 2+ pulses, warm, well-perfused, no edema. 


NEUROLOGICAL: Cranial nerves II through XII grossly intact. Normal speech.











LABS


  CBCD











WBC  37.6 K/mm3 (4.0-10.8)  H*  10/07/19  06:57    


 


RBC  3.12 M/mm3 (3.60-5.2)  L  10/07/19  06:57    


 


Hgb  10.7 GM/dl (10.7-15.3)   10/07/19  06:57    


 


Hct  32.0 % (32.4-45.2)  L  10/07/19  06:57    


 


MCV  102.5 fl (80-96)  H  10/07/19  06:57    


 


MCHC  33.4 g/dl (32.0-36.0)   10/07/19  06:57    


 


RDW  19.7 % (11.6-15.6)  H  10/07/19  06:57    


 


Plt Count  110 K/MM3 (134-434)  L  10/07/19  06:57    


 


MPV  7.5 fl (7.5-11.1)   10/07/19  06:57    








 CMP











Sodium  137 mmol/L (136-145)   10/07/19  06:57    


 


Potassium  4.2 mmol/L (3.5-5.1)   10/07/19  06:57    


 


Chloride  106 mmol/L ()   10/07/19  06:57    


 


Carbon Dioxide  23 mmol/L (21-32)   10/07/19  06:57    


 


Anion Gap  8 MMOL/L (8-16)   10/07/19  06:57    


 


BUN  21.0 mg/dl (7-18)  H  10/07/19  06:57    


 


Creatinine  0.6 mg/dl (0.55-1.3)   10/07/19  06:57    


 


Calcium  8.3 mg/dl (8.5-10)  L  10/07/19  06:57    


 


Total Bilirubin  0.5 mg/dl (0.2-1)   10/07/19  06:57    


 


AST  28 U/L (15-37)   10/07/19  06:57    


 


ALT  55 U/L (13-61)   10/07/19  06:57    


 


Alkaline Phosphatase  481 U/L ()  H D 10/07/19  06:57    


 


Total Protein  5.9 g/dl (6.4-8.2)  L  10/07/19  06:57    


 


Albumin  3.0 g/dl (3.4-5.0)  L  10/07/19  06:57    














HOSPITAL COURSE:





Date of Admission:09/30/19





Date of Discharge: 10/07/19





Pre hospital course


63 year-old female with a PMH significant for myeloproliferative disorder (June 2019), rectal carcinoid, leukocytosis, GERD, migraines, recurrent UTIs, 

osteomyeltitis C4-C5 s/p C4-C5 discectomy and fusion (2017), and cholecystitis s

/p lap blanca (02/2019). Presented to the ED with left rib/flank pain x 24 

hours. Pt was in her USOH, last night developed gradual onset mild left lower 

flank/rib pain, did not take anything and slept throughout the night but awoke 

this morning with increased pain, positional and worse with deep inspiration. 

The pain radiates to the left shoulder. 





ER course


(1) WBC 105k


(2) Alk phos 431


(3) CTAP: since 3/10/19 interval development of splenomegaly





Subsequent hospital course


63 year-old female with a PMH significant for myeloproliferative disorder (June 2019), rectal carcinoid, leukocytosis, GERD, migraines, recurrent UTIs, 

osteomyeltitis C4-C5 s/p C4-C5 discectomy and fusion (2017), and cholecystitis s

/p lap blanca (02/2019). Admitted for hyperleukocytosis and splenomegaly. 





Splenomegaly


   --10/2 MRI abdomen: splenomegaly, interval development since 3/10/19; from 

[id number] --> [id number]; no sign of infarct


   --LUQ pain was severe on admission, treated with tylenol and morphine PRN; 

patient reports minimal pain as of date of discharge





Hyperleukocytosis


Myeloproliferative neoplasm


Elevated alk phos


   --WBC has been elevated since February 2019, significant spike since July (40

-->105k)


   --per Dr. Codie Ulloa, hematologist at [facility], hydroxyurea was increased 

to 1000mg q12h, and allopurinol 300mg daily was added; WBC down trended 105k-->

37.6k on date of discharge


   --to continue on hydroxyurea 500mg daily and allopurinol on discharge 





Rule out pneumonia


   --several low grade fevers as high as 100.8, defervesced on 10/4


   --bilateral pleural effusions were seen; may have been the result of volume 

overload (patient was aggressively hydrated on admission) but concern for PNA 

in this immunocompromised patient with low grade fever


   --ID treated empirically with meropenem x 5 days


   --discharged off antibiotics


   


Rectal carcinoid


   --continued Lomitil PRN


   


GERD


   --continued protonix





Migraines


   --continued Topomax





Anxiety


   --continued lorazepam








Minutes to complete discharge: 35





Discharge Summary


Problems reviewed: Yes


Reason For Visit: HYPERLEUKOCYTOSIS


Current Active Problems





Diarrhea (Acute)


Hyperleukocytosis (Acute)


Left flank pain (Acute)


Myeloproliferative neoplasm (Acute)


Prophylactic measure (Acute)


Transaminitis (Acute)








Condition: Improved





- Instructions


Diet, Activity, Other Instructions: 


A prescription has been sent to your pharmacy for allopurinol. Take this 

medication as prescribed.





As discussed, you will be following up this week with your hematologist, Dr. Codie Ulloa, at [facility]. 


Referrals: 


Dr. Codie Ulloa [Other]


Disposition: HOME





- Home Medications


Comprehensive Discharge Medication List: 


Ambulatory Orders





Octreotide Acetate [Sandostatin] 30 mcg IJ MONTHLY  ampul 08/31/17 


Lipase/Protease/Amylase [Creon Dr 24,000 Units Capsule] 4 each PO TID 03/10/19 


Hydroxyurea [Hydrea 500Mg Capsule -] 500 mg PO DAILY 09/30/19 


Acetaminophen [Tylenol .Regular Strength -] 650 mg PO Q6H PRN  tablet 10/03/19 


Allopurinol [Zyloprim -] 300 mg PO DAILY  tablet 10/03/19 


Fluticasone Prop 0.05% Nasal [Flonase -] 1 spray NS BID  spray 10/03/19 


Heparin - 5,000 unit SQ TID  vial 10/03/19 


Hydroxyurea [Hydrea 500Mg Capsule -] 1,000 mg PO BID  capsule 10/03/19 


LORazepam [Ativan] 2 mg PO HS #2 tablet MDD 1 10/03/19 


Melatonin 5 mg PO HS PRN  tab 10/03/19 


Meropenem [Merrem (Restricted To Id) -] 1 gm IVPB Q8H-IV  vial 10/03/19 


Montelukast Na [Singulair -] 10 mg PO HS  tablet 10/03/19 


Morphine Sulfate 4 mg IVPUSH Q6H PRN #1 vial MDD 4 10/03/19 


Pantoprazole Sodium [Protonix -] 20 mg PO HS  tablet.ec 10/03/19 


Sennosides/Docusate Sodium [Pericolace -] 1 tablet PO BID  tablet 10/03/19 


Telotristat Etiprate [Xermelo] 250 mg PO DAILY #30 tab 10/03/19 


Topiramate [Topamax -] 50 mg PO DAILY  tablet 10/03/19 


Lomotil 2.5-0.025 mg Tablet 2 tab PO QID PRN 10/05/19 








This patient is new to me today: No


Emergency Visit: Yes


ED Registration Date: 09/30/19


Care time: The patient presented to the Emergency Department on the above date 

and was hospitalized for further evaluation of their emergent condition.


Critical Care patient: No





- Discharge Referral


Referred to [facility] No

## 2019-10-07 NOTE — PN
Progress Note, Physician


History of Present Illness: 





stable


doing well


no new issues





- Current Medication List


Current Medications: 


Active Medications





Acetaminophen (Ofirmev Injection -)  1,000 mg IVPB Q6H PRN


   PRN Reason: PAIN LEVEL 1-5


   Last Admin: 10/04/19 16:12 Dose:  1,000 mg


Allopurinol (Zyloprim -)  300 mg PO DAILY [facility]


   Last Admin: 10/07/19 09:06 Dose:  300 mg


Diphenoxylate HCl/Atropine (Lomotil -)  1 combo PO QID PRN


   PRN Reason: DIARRHEA


   Last Admin: 10/07/19 13:12 Dose:  1 combo


Fluticasone Propionate (Flonase -)  1 spray NS BID [facility]


   Last Admin: 10/07/19 09:06 Dose:  1 spray


Heparin Sodium (Porcine) (Heparin -)  5,000 unit SQ TID [facility]


   Last Admin: 10/07/19 05:56 Dose:  Not Given


Meropenem 1 gm/ Dextrose  100 mls @ 200 mls/hr IVPB Q8H-IV [facility]


   Last Admin: 10/07/19 09:05 Dose:  200 mls/hr


Loratadine (Claritin -)  10 mg PO DAILY [facility]


   Last Admin: 10/07/19 09:06 Dose:  10 mg


Lorazepam (Ativan -)  2 mg PO HS [facility]


   Last Admin: 10/06/19 21:17 Dose:  2 mg


Melatonin (Melatonin)  5 mg PO HS PRN


   PRN Reason: INSOMNIA


   Last Admin: 10/01/19 03:18 Dose:  5 mg


Montelukast Sodium (Singulair -)  10 mg PO HS [facility]


   Last Admin: 10/06/19 21:18 Dose:  10 mg


Pantoprazole Sodium (Protonix -)  20 mg PO HS [facility]


   Last Admin: 10/06/19 21:18 Dose:  20 mg


Topiramate (Topamax -)  50 mg PO DAILY [facility]


   Last Admin: 10/07/19 09:06 Dose:  50 mg











- Objective


Vital Signs: 


 Vital Signs











Temperature  98.8 F   10/07/19 10:06


 


Pulse Rate  85   10/07/19 10:06


 


Respiratory Rate  18   10/07/19 10:06


 


Blood Pressure  105/56 L  10/07/19 10:06


 


O2 Sat by Pulse Oximetry (%)  96   10/07/19 08:25











Constitutional: Yes: No Distress, Calm


Cardiovascular: Yes: S1, S2


Respiratory: Yes: Regular, CTA Bilaterally


Gastrointestinal: Yes: Normal Bowel Sounds, Soft


Musculoskeletal: Yes: WNL


Extremities: Yes: WNL


Neurological: Yes: Alert, Oriented


Psychiatric: Yes: Alert, Oriented


Labs: 


 CBC, BMP





 10/07/19 06:57 





 10/07/19 06:57 











Assessment/Plan





63 year-old female with a PMH significant for myeloproliferative disorder (June 2019), rectal carcinoid, leukocytosis, GERD, migraines, recurrent UTIs, 

osteomyeltitis C4-C5 s/p C4-C5 discectomy and fusion (2017), and cholecystitis s

/p lap blanca (02/2019). Admitted for hyperleukocytosis and splenomegaly. 





Splenomegaly


Hyperleukocytosis


Myeloproliferative disorder


pneumonia


Rectal carcinoid


GERD


Migraines


Anxiety





plan





wbc trending down


will stop abx and monitor


rest as per the team

## 2019-10-07 NOTE — PN
Progress Note (short form)





- Note


Progress Note: 





L abd pain is improved. Having an episode of loose stools now which she feels 

is typical of her pattern w carcinoid/change in eating habits/change in meds


Eager for d/c home





PE


NAD


lungs -CTA


[facility]-reg S1S2


abd- soft, ND, focal tenderness over splenic edge


ext-no edema


skin- healing R thigh ecchymosis (banged into her oven door); ecchymoses at IV 

sites, notably large bruise L forearm





Imp: improved LQ pain, clin splenic infarct, improved leukocytosis in setting 

of MPN


   For d/c. As d/w Dr Ulloa, plan is for resumption of outpt HU dosing 500 mg 

daily, cont allopurinol; she will f/u for counts in office this wk

## 2019-10-22 ENCOUNTER — HOSPITAL ENCOUNTER (EMERGENCY)
Dept: HOSPITAL 74 - FER | Age: 64
Discharge: ANOTHER HOSPITAL | End: 2019-10-22
Payer: COMMERCIAL

## 2019-10-22 VITALS — HEART RATE: 96 BPM | TEMPERATURE: 98.8 F | SYSTOLIC BLOOD PRESSURE: 115 MMHG | DIASTOLIC BLOOD PRESSURE: 51 MMHG

## 2019-10-22 VITALS — BODY MASS INDEX: 24.7 KG/M2

## 2019-10-22 DIAGNOSIS — C94.6: Primary | ICD-10-CM

## 2019-10-22 DIAGNOSIS — Z88.8: ICD-10-CM

## 2019-10-22 DIAGNOSIS — R16.1: ICD-10-CM

## 2019-10-22 DIAGNOSIS — D3A.026: ICD-10-CM

## 2019-10-22 LAB
ALBUMIN SERPL-MCNC: 3.1 G/DL (ref 3.4–5)
ALP SERPL-CCNC: 620 U/L (ref 45–117)
ALT SERPL-CCNC: 22 U/L (ref 13–61)
ANION GAP SERPL CALC-SCNC: 9 MMOL/L (ref 8–16)
AST SERPL-CCNC: 35 U/L (ref 15–37)
BILIRUB SERPL-MCNC: 0.5 MG/DL (ref 0.2–1)
BUN SERPL-MCNC: 16 MG/DL (ref 7–18)
CALCIUM SERPL-MCNC: 8.4 MG/DL (ref 8.5–10)
CAOX CRY URNS QL MICRO: (no result) /HPF
CHLORIDE SERPL-SCNC: 109 MMOL/L (ref 98–107)
CO2 SERPL-SCNC: 21 MMOL/L (ref 21–32)
CREAT SERPL-MCNC: 0.9 MG/DL (ref 0.55–1.3)
DEPRECATED RDW RBC AUTO: 21 % (ref 11.6–15.6)
EPITH CASTS URNS QL MICRO: (no result) /HPF
GLUCOSE SERPL-MCNC: 185 MG/DL (ref 74–106)
HCT VFR BLD CALC: 30.9 % (ref 32.4–45.2)
HGB BLD-MCNC: 10.5 GM/DL (ref 10.7–15.3)
MCH RBC QN AUTO: 36.3 PG (ref 25.7–33.7)
MCHC RBC AUTO-ENTMCNC: 34.1 G/DL (ref 32–36)
MCV RBC: 106.3 FL (ref 80–96)
PLATELET # BLD AUTO: 156 K/MM3 (ref 134–434)
PLATELET BLD QL SMEAR: ADEQUATE
PMV BLD: 8 FL (ref 7.5–11.1)
POTASSIUM SERPLBLD-SCNC: 3.7 MMOL/L (ref 3.5–5.1)
PROT SERPL-MCNC: 6.2 G/DL (ref 6.4–8.2)
RBC # BLD AUTO: 2.91 M/MM3 (ref 3.6–5.2)
SODIUM SERPL-SCNC: 139 MMOL/L (ref 136–145)
WBC # BLD AUTO: 101.9 K/MM3 (ref 4–10.8)

## 2019-10-22 PROCEDURE — 3E0337Z INTRODUCTION OF ELECTROLYTIC AND WATER BALANCE SUBSTANCE INTO PERIPHERAL VEIN, PERCUTANEOUS APPROACH: ICD-10-PCS

## 2019-10-22 NOTE — PDOC
History of Present Illness





<Kevin Kathleen - Last Filed: 10/22/19 15:07>





- History of Present Illness


Initial Comments: 





Ms. Garner is a 65 y/o female with PMH of carcinoid tumor in the rectum, 

myeloproliferative disorder, and splenomegaly, presenting with fever of 102F. 

Reports that the fever started yesterday. She called her oncologist Dr. Ulloa 

who sent her to the ED. Reports mild cough and abdominal pain. She was recently 

seen here for similar symptoms earlier this month. 





Denies headache, vision changes, focal neuro weakness. Denies chest pain/

shortness of breath. Denies nausea/vomiting/urinary symptoms/changes in stool. 








<Rob Moreno - Last Filed: 10/22/19 20:40>





- General


Chief Complaint: Weakness


Stated Complaint: WEAK


Time Seen by Provider: 10/22/19 10:34





Past History





<Kevin Kathleen - Last Filed: 10/22/19 15:07>





- Past Medical History


Cancer: Yes (RECTAL CARCINOID)


COPD: No


GI Disorders: Yes (GERD)


Other medical history: MYLOPROLIFERONEOPLASM





- Surgical History


Cholecystectomy: Yes (2/28/19)


Gastric Stapling: Yes





- Psycho Social/Smoking Cessation Hx


Smoking History: Never smoked


Have you smoked in the past 12 months: No


Hx Alcohol Use: Yes (OCASIONAL)


Drug/Substance Use Hx: No


Substance Use Type: None





<Rob Moreno - Last Filed: 10/22/19 20:40>





- Past Medical History


Allergies/Adverse Reactions: 


 Allergies











Allergy/AdvReac Type Severity Reaction Status Date / Time


 


everolimus [From Afinitor] AdvReac Severe SOB, Verified 10/22/19 10:20





   Myalgias,  





   Arthralgias,Edema  


 


erythromycin base AdvReac   Verified 10/22/19 10:20





[Erythromycin Base]     











Home Medications: 


Ambulatory Orders





Octreotide Acetate [Sandostatin] 30 mcg IJ MONTHLY  ampul 08/31/17 


Lipase/Protease/Amylase [Creon Dr 24,000 Units Capsule] 3 each PO TID 03/10/19 


Hydroxyurea [Hydrea 500Mg Capsule -] 500 mg PO ASDIR 09/30/19 


Fluticasone Prop 0.05% Nasal [Flonase -] 1 spray NS BID  spray 10/03/19 


LORazepam [Ativan] 2 mg PO HS #2 tablet MDD 1 10/03/19 


Topiramate [Topamax -] 50 mg PO DAILY  tablet 10/03/19 


Allopurinol 300 mg PO DAILY #30 tablet 10/07/19 


Hydroxyurea [Hydrea 500Mg Capsule -] 1,000 mg PO ASDIR 10/22/19 


Telotristat Etiprate [Xermelo] 250 mg PO DAILY 10/22/19 











**Review of Systems





- Review of Systems


Comments:: 





ROS


GENERAL/CONSTITUTIONAL: Reports fever. No chills. No weakness._


HEAD, EYES, EARS, NOSE AND THROAT: No change in vision. No change in hearing. 

No sore throat._


CARDIOVASCULAR: No chest pain or shortness of breath_


RESPIRATORY: Denies cough, hemoptysis_


GASTROINTESTINAL: Reports abdominal pain. No nausea, vomiting, diarrhea or 

constipation._


GENITOURINARY: No dysuria, frequency, or change in urination._


MUSCULOSKELETAL: No joint or muscle swelling or pain. No neck or back pain._


SKIN: No rash_


NEUROLOGIC: No headache, vertigo, loss of consciousness, or change in strength/

sensation._


ENDOCRINE: No increased thirst. No abnormal weight change_


HEMATOLOGIC/LYMPHATIC: No anemia, easy bleeding, or history of blood clots._


ALLERGIC/IMMUNOLOGIC: No hives or skin allergy._








<Rob Moreno - Last Filed: 10/22/19 20:40>





*Physical Exam





- Vital Signs


 Last Vital Signs











Temp Pulse Resp BP Pulse Ox


 


 100.8 F H  97 H  17   127/86   95 


 


 10/22/19 13:04  10/22/19 13:04  10/22/19 13:04  10/22/19 13:04  10/22/19 13:04














<Kevin Kathleen - Last Filed: 10/22/19 15:07>





- Vital Signs


 Last Vital Signs











Temp Pulse Resp BP Pulse Ox


 


 98.0 F   97 H  17   114/71   96 


 


 10/22/19 10:19  10/22/19 10:19  10/22/19 10:19  10/22/19 10:19  10/22/19 10:19














- Physical Exam


Comments: 








GENERAL: Awake, alert, and oriented to person/place/time, in no acute distress_


HEAD: No signs of trauma, normocephalic, atraumatic _


EYES: PERRLA, EOMI, sclera anicteric, conjunctiva clear_


ENT: Hearing grossly normal, nares patent, oropharynx clear without exudates. 

No uvular deviation. Moist mucosa_


NECK: Normal ROM, supple, JVD, or masses. Swollen and tender lymph node ~3cm on 

right lateral neck. 


LUNGS: No distress, speaks in full sentences, clear to auscultation bilaterally 

_


HEART: Regular rate and rhythm, normal S1 and S2, no murmurs appreciated, 

peripheral pulses normal and equal bilaterally._


ABDOMEN: Soft, TTP LUQ. No guarding, no rebound. No masses. No bruising. 


EXTREMITIES: Normal inspection, Normal range of motion, no edema. No clubbing 

or cyanosis_


NEUROLOGICAL: Cranial nerves II through XII grossly intact. Normal speech, 

normal gait, no focal sensorimotor deficits _


SKIN: Warm, Dry, normal turgor, no rashes or lesions noted_











<Rob Moreno - Last Filed: 10/22/19 20:40>





ED Treatment Course





- LABORATORY


CBC & Chemistry Diagram: 


 10/22/19 11:20





 10/22/19 11:20





- ADDITIONAL ORDERS


Additional order review: 


 Laboratory  Results











  10/22/19 10/22/19





  12:18 11:20


 


Sodium   139


 


Potassium   3.7


 


Chloride   109 H


 


Carbon Dioxide   21


 


Anion Gap   9


 


BUN   16.0


 


Creatinine   0.9


 


Est GFR (CKD-EPI)AfAm   78.32


 


Est GFR (CKD-EPI)NonAf   67.57


 


Random Glucose   185 H


 


Calcium   8.4 L


 


Total Bilirubin   0.5


 


AST   35


 


ALT   22


 


Alkaline Phosphatase   620 H D


 


Total Protein   6.2 L


 


Albumin   3.1 L


 


Urine Color  Yellow 


 


Urine Appearance  Clear 


 


Urine pH  5.5 


 


Urine Protein  1+ H 


 


Urine Glucose (UA)  Negative 


 


Urine Ketones  Negative 


 


Urine Blood  1+ H 


 


Urine Nitrite  Negative 


 


Urine Bilirubin  Negative 


 


Urine Urobilinogen  0.2 


 


Ur Leukocyte Esterase  Trace H 


 


Urine RBC  10-20 


 


Urine WBC  5-10 


 


Ur Transition Epith Cell  Few 


 


Calcium Oxalate Crystal  Many 








 











  10/22/19





  11:20


 


RBC  2.91 L


 


MCV  106.3 H


 


MCHC  34.1


 


RDW  21.0 H


 


MPV  8.0


 


Neutrophils %  No Result Required.


 


Lymphocytes %  No Result Required.














- Medications


Given in the ED: 


ED Medications














Discontinued Medications














Generic Name Dose Route Start Last Admin





  Trade Name Frejose e  PRN Reason Stop Dose Admin


 


Acetaminophen  650 mg  10/22/19 13:09  10/22/19 13:14





  Tylenol -  PO  10/22/19 13:10  650 mg





  ONCE ONE   Administration





     





     





     





     


 


Sodium Chloride  1,000 ml  10/22/19 10:43  10/22/19 11:35





  Normal Saline -  IV  10/22/19 10:44  1,000 ml





  ONCE ONE   Administration





     





     





     





     


 


Sodium Chloride  1,000 ml  10/22/19 15:02  10/22/19 15:03





  Normal Saline -  IV  10/22/19 15:03  1,000 ml





  ONCE ONE   Administration





     





     





     





     














<Kevin Kathleen - Last Filed: 10/22/19 15:07>





- LABORATORY


CBC & Chemistry Diagram: 


 10/22/19 11:20





 10/22/19 11:20





- RADIOLOGY


Radiology Studies Ordered: 














 Category Date Time Status


 


 CHEST PA & LAT [RAD] Stat Radiology  10/22/19 10:42 Ordered














<Rob Moreno - Last Filed: 10/22/19 20:40>





Medical Decision Making





- Medical Decision Making





10/22/19 10:45


64F with hx of myeloproliferative disorder, splenomegaly, rectal carcinoid tumor

, here for fever of 102 since yesterday. Sent in by her oncologist Dr. Ulloa.


Recently admitted earlier this month for hyperleukocytosis.


Will obtain CBC, CMP, CXR, blood cultures, UA/UC.





10/22/19 12:04


CXR shows left basilar atelectasis and trace bilateral effusions, unchanged 

from prior. 





10/22/19 12:43


Labs reviewed. WBC > 100K. Call placed to Dr. Ulloa's office.





10/22/19 12:59


D/w Dr. Ulloa's office PA. They will call back with a plan.








<Rob Moreno - Last Filed: 10/22/19 20:40>





Discharge





- Transfer to Acute Care Facility


Accepting Physician:: Dr. Rosa (hospitalist), Dr. Maynard (onc), [facility]





<Kevin Kathleen - Last Filed: 10/22/19 15:07>





- Discharge Information


Problems reviewed: Yes





<Rob Moreno - Last Filed: 10/22/19 20:40>





- Discharge Information


Clinical Impression/Diagnosis: 


 Hyperleukocytosis, Myeloproliferative neoplasm





Condition: Stable


Disposition: TRANSFER ACUTE CARE/OTHER HOSP





- Follow up/Referral


Referrals: 


Kingston Kruger MD [Primary Care Provider] - 





- Patient Discharge Instructions





- Post Discharge Activity

## 2019-10-22 NOTE — PDOC
Attending Attestation





- Resident


Resident Name: Rob Moreno





- ED Attending Attestation


I have performed the following: I have examined & evaluated the patient, The 

case was reviewed & discussed with the resident, I agree w/resident's findings 

& plan





- HPI


HPI: 





10/22/19 11:57


64-year-old female with history of rectal carcinoid, myeloproliferative disease 

on oral medications with recent admission for leukocytosis and clinical splenic 

infarct requiring Hydrea, discharged to outpatient follow-up now presents with 

several days of progressive generalized weakness and shortness of breath, low-

grade fever for 2 days of 100.3 followed by maximum temperature of 102 last 

night.  Positive dry cough, no other chest pain or headache, no abdominal 

complaints or  complaints, presents for evaluation. Pt was noted to have 

recurring fevers during her hospitalization, treated empirically with meropenem 

in setting of pleural effusions, discharged off abx. 


Upon arrival, spoke to her oncologist who reported that a bone marrow biopsy 

performed last week showed a transition in her neoplasm type, and that it would 

require initiation of some chemotherapy.











- Physicial Exam


PE: 





10/22/19 12:00


Afebrile here with normal vital signs, slight tachypnea of 20, O2 sat is normal


Alert, speaking full sentences, overall well-appearing


Oropharynx is clear, no mucosal lesions


Heart is regular without audible murmur


Bibasilar crackles to the mid lung field, no wheezing


Abdomen benign, mild LUQ discomfort to palpation with palpable spleen edge


Bilateral upper extremities with resolving ecchymosis from prior hospitalization

, otherwise no cellulitis or warmth or tenderness to palpation.  2+ lower 

extremity edema bilaterally, chronic.








- Medical Decision Making





10/22/19 12:01


64-year-old female h/o MPN with recent admission now with dyspnea, generalized 

weakness, and fever last night. r/o infection versus progression of underlying 

disease/neoplasm fever, reportedly needs new chemo treatment per Dr. Ulloa, her 

oncologist. 


labs including cultures


iv fluids


cxr shows persistent b/l small effusions


discuss disposition with onc team (Artem[facility] covered by Dr. Londono here, also sees 

Dr. Ambriz at [place])





10/22/19 12:41


recurrence of hyperleukocytosis, 101 from 37 at discharge. Remaining labs and 

alk phos unchanged from prior. 


Discussed with Dr. Londono, but has not been updated on patient's outpt workup 

since d/c. Will contact Dr. Ulloa for plan. 





10/22/19 12:55


discussed with KAMILA Tripathi, who works with Dr. Ulloa. Pt was seen on 10/10 and 

placed on alternating doses of 500/1000 Hyroxyurea with plans for repeat labs 

around now. Pt became symptomatic, presented here. Given repeat elevation in WBC

, will likely need hydrea treatment again. He will speak to Dr Ulloa regarding 

management details, including any need for transfer, and call back. 





10/22/19 15:05


Dr. Ulloa and Dr. Londono discussed case. Given need for hydrea/allopurinol AND 

chemotherapy, recommend transfer to  hospital for continued management with 

Dr. Maynard, her oncologist. Dr. Londono spoke with Dr. Maynard ([phone])

, who recommends transfer to [place] [facility]. Pt consents. Accepted for transfer 

by hospitalist, Dr. Irene Rosa. Case management arranging transfer. 


Pt clinically unchanged. 





10/22/19 17:46


well appearing, ambulating, nad. VSS. 


remains accepted medically. discussed with Case management (Rosa at ) and 

they will accept for transfer despite incomplete authorization. Will arrange 

for transport to  inpatient bed. physician/nursing report given. 








**Heart Score/ECG Review


  ** #1


ECG reviewed & interpreted by me at: 12:29


General ECG Interpretation: Sinus Rhythm, Normal Rate (96), Normal Intervals (

qtc 421), No acute ischemic changes


Compared to previous ECG there are: No significant change (c/w 9/30)

## 2019-10-23 NOTE — EKG
Test Reason : 

Blood Pressure : ***/*** mmHG

Vent. Rate : 096 BPM     Atrial Rate : 096 BPM

   P-R Int : 162 ms          QRS Dur : 072 ms

    QT Int : 334 ms       P-R-T Axes : 058 010 012 degrees

   QTc Int : 421 ms

 

NORMAL SINUS RHYTHM

POSSIBLE LEFT ATRIAL ENLARGEMENT

BORDERLINE ECG

WHEN COMPARED WITH ECG OF 30-SEP-2019 13:13,

NO SIGNIFICANT CHANGE WAS FOUND

Confirmed by TEJA SPENCE, KIRILL ([id number]) on 10/23/2019 9:43:22 AM

 

Referred By:             Confirmed By:KIRILL LEZAMA MD

## 2021-01-26 ENCOUNTER — TRANSCRIPTION ENCOUNTER (OUTPATIENT)
Age: 66
End: 2021-01-26

## 2021-06-27 ENCOUNTER — HOSPITAL ENCOUNTER (INPATIENT)
Dept: HOSPITAL 74 - FER | Age: 66
LOS: 4 days | Discharge: HOME | DRG: 603 | End: 2021-07-01
Attending: NURSE PRACTITIONER | Admitting: HOSPITALIST
Payer: COMMERCIAL

## 2021-06-27 VITALS — BODY MASS INDEX: 24.5 KG/M2

## 2021-06-27 DIAGNOSIS — K52.1: ICD-10-CM

## 2021-06-27 DIAGNOSIS — D47.1: ICD-10-CM

## 2021-06-27 DIAGNOSIS — C7A.026: ICD-10-CM

## 2021-06-27 DIAGNOSIS — K21.9: ICD-10-CM

## 2021-06-27 DIAGNOSIS — M46.22: ICD-10-CM

## 2021-06-27 DIAGNOSIS — K44.9: ICD-10-CM

## 2021-06-27 DIAGNOSIS — L03.116: Primary | ICD-10-CM

## 2021-06-27 DIAGNOSIS — T45.1X5A: ICD-10-CM

## 2021-06-27 LAB
ALBUMIN SERPL-MCNC: 3.4 G/DL (ref 3.4–5)
ALP SERPL-CCNC: 132 U/L (ref 45–117)
ALT SERPL-CCNC: 23 U/L (ref 13–61)
ANION GAP SERPL CALC-SCNC: 10 MMOL/L (ref 8–16)
ANISOCYTOSIS BLD QL: (no result)
AST SERPL-CCNC: 24 U/L (ref 15–37)
BILIRUB SERPL-MCNC: 0.7 MG/DL (ref 0.2–1)
BUN SERPL-MCNC: 12 MG/DL (ref 7–18)
CALCIUM SERPL-MCNC: 8.4 MG/DL (ref 8.5–10)
CHLORIDE SERPL-SCNC: 102 MMOL/L (ref 98–107)
CO2 SERPL-SCNC: 21 MMOL/L (ref 21–32)
CREAT SERPL-MCNC: 0.7 MG/DL (ref 0.55–1.3)
DEPRECATED RDW RBC AUTO: 18.8 % (ref 11.6–15.6)
EPITH CASTS URNS QL MICRO: (no result) /HPF
GLUCOSE SERPL-MCNC: 146 MG/DL (ref 74–106)
HCT VFR BLD CALC: 31.1 % (ref 32.4–45.2)
HGB BLD-MCNC: 9.5 GM/DL (ref 10.7–15.3)
MACROCYTES BLD QL: (no result)
MCH RBC QN AUTO: 30.2 PG (ref 25.7–33.7)
MCHC RBC AUTO-ENTMCNC: 30.6 G/DL (ref 32–36)
MCV RBC: 98.5 FL (ref 80–96)
PLATELET # BLD AUTO: 34 10^3/UL (ref 134–434)
PLATELET BLD QL SMEAR: (no result)
PMV BLD: 12.1 FL (ref 7.5–11.1)
PROT SERPL-MCNC: 6.5 G/DL (ref 6.4–8.2)
RBC # BLD AUTO: 3.16 M/MM3 (ref 3.6–5.2)
RBC MORPH BLD: YES
SODIUM SERPL-SCNC: 133 MMOL/L (ref 136–145)
WBC # BLD AUTO: 10.9 K/MM3 (ref 4–10.8)

## 2021-06-27 PROCEDURE — C9803 HOPD COVID-19 SPEC COLLECT: HCPCS

## 2021-06-27 PROCEDURE — U0005 INFEC AGEN DETEC AMPLI PROBE: HCPCS

## 2021-06-27 PROCEDURE — U0003 INFECTIOUS AGENT DETECTION BY NUCLEIC ACID (DNA OR RNA); SEVERE ACUTE RESPIRATORY SYNDROME CORONAVIRUS 2 (SARS-COV-2) (CORONAVIRUS DISEASE [COVID-19]), AMPLIFIED PROBE TECHNIQUE, MAKING USE OF HIGH THROUGHPUT TECHNOLOGIES AS DESCRIBED BY CMS-2020-01-R: HCPCS

## 2021-06-27 PROCEDURE — G0378 HOSPITAL OBSERVATION PER HR: HCPCS

## 2021-06-28 LAB
ANION GAP SERPL CALC-SCNC: 9 MMOL/L (ref 8–16)
ANISOCYTOSIS BLD QL: (no result)
BUN SERPL-MCNC: 11 MG/DL (ref 7–18)
CALCIUM SERPL-MCNC: 8 MG/DL (ref 8.5–10)
CHLORIDE SERPL-SCNC: 107 MMOL/L (ref 98–107)
CO2 SERPL-SCNC: 23 MMOL/L (ref 21–32)
CREAT SERPL-MCNC: 0.7 MG/DL (ref 0.55–1.3)
DEPRECATED RDW RBC AUTO: 18.9 % (ref 11.6–15.6)
GLUCOSE SERPL-MCNC: 125 MG/DL (ref 74–106)
HCT VFR BLD CALC: 28.8 % (ref 32.4–45.2)
HGB BLD-MCNC: 8.9 GM/DL (ref 10.7–15.3)
MACROCYTES BLD QL: (no result)
MAGNESIUM SERPL-MCNC: 1.9 MG/DL (ref 1.8–2.4)
MCH RBC QN AUTO: 30.6 PG (ref 25.7–33.7)
MCHC RBC AUTO-ENTMCNC: 31.1 G/DL (ref 32–36)
MCV RBC: 98.3 FL (ref 80–96)
PLATELET # BLD AUTO: 26 10^3/UL (ref 134–434)
PLATELET BLD QL SMEAR: (no result)
PMV BLD: 11.3 FL (ref 7.5–11.1)
RBC # BLD AUTO: 2.92 M/MM3 (ref 3.6–5.2)
RBC MORPH BLD: YES
SODIUM SERPL-SCNC: 139 MMOL/L (ref 136–145)
WBC # BLD AUTO: 7.2 K/MM3 (ref 4–10.8)

## 2021-06-28 RX ADMIN — CHOLESTYRAMINE SCH GM: 4 POWDER, FOR SUSPENSION ORAL at 10:14

## 2021-06-28 RX ADMIN — CHOLESTYRAMINE SCH: 4 POWDER, FOR SUSPENSION ORAL at 00:08

## 2021-06-28 RX ADMIN — VITAMIN D, TAB 1000IU (100/BT) SCH UNIT: 25 TAB at 10:15

## 2021-06-28 RX ADMIN — POTASSIUM CHLORIDE SCH MEQ: 1500 TABLET, EXTENDED RELEASE ORAL at 10:11

## 2021-06-28 RX ADMIN — CEFAZOLIN SODIUM SCH MLS/HR: 1 INJECTION, SOLUTION INTRAVENOUS at 17:00

## 2021-06-28 RX ADMIN — FLUTICASONE PROPIONATE SCH SPRAY: 50 SPRAY, METERED NASAL at 10:16

## 2021-06-28 RX ADMIN — ENOXAPARIN SODIUM SCH: 40 INJECTION SUBCUTANEOUS at 13:58

## 2021-06-28 RX ADMIN — CEFAZOLIN SODIUM SCH MLS/HR: 1 INJECTION, SOLUTION INTRAVENOUS at 10:10

## 2021-06-28 RX ADMIN — FUROSEMIDE SCH: 40 TABLET ORAL at 10:17

## 2021-06-28 RX ADMIN — CHOLESTYRAMINE SCH GM: 4 POWDER, FOR SUSPENSION ORAL at 21:16

## 2021-06-28 RX ADMIN — MONTELUKAST SODIUM SCH MG: 10 TABLET, COATED ORAL at 21:15

## 2021-06-28 RX ADMIN — FLUTICASONE PROPIONATE SCH SPRAY: 50 SPRAY, METERED NASAL at 21:10

## 2021-06-28 RX ADMIN — ALLOPURINOL SCH MG: 300 TABLET ORAL at 10:12

## 2021-06-28 RX ADMIN — PANCRELIPASE SCH CAP: 36000; 180000; 114000 CAPSULE, DELAYED RELEASE PELLETS ORAL at 11:40

## 2021-06-28 RX ADMIN — CYANOCOBALAMIN TAB 1000 MCG SCH MCG: 1000 TAB at 10:11

## 2021-06-28 RX ADMIN — CHOLESTYRAMINE SCH GM: 4 POWDER, FOR SUSPENSION ORAL at 21:15

## 2021-06-28 RX ADMIN — PANCRELIPASE SCH CAP: 36000; 180000; 114000 CAPSULE, DELAYED RELEASE PELLETS ORAL at 17:00

## 2021-06-28 RX ADMIN — Medication SCH: at 06:21

## 2021-06-28 RX ADMIN — CALCIUM SCH MG: 500 TABLET ORAL at 10:11

## 2021-06-28 RX ADMIN — Medication SCH: at 00:08

## 2021-06-29 LAB
ALBUMIN SERPL-MCNC: 2.9 G/DL (ref 3.4–5)
ALP SERPL-CCNC: 111 U/L (ref 45–117)
ALT SERPL-CCNC: 20 U/L (ref 13–61)
ANION GAP SERPL CALC-SCNC: 9 MMOL/L (ref 8–16)
AST SERPL-CCNC: 17 U/L (ref 15–37)
BILIRUB SERPL-MCNC: 0.6 MG/DL (ref 0.2–1)
BUN SERPL-MCNC: 14 MG/DL (ref 7–18)
CALCIUM SERPL-MCNC: 8 MG/DL (ref 8.5–10)
CHLORIDE SERPL-SCNC: 107 MMOL/L (ref 98–107)
CO2 SERPL-SCNC: 22 MMOL/L (ref 21–32)
CREAT SERPL-MCNC: 0.7 MG/DL (ref 0.55–1.3)
DEPRECATED RDW RBC AUTO: 18.5 % (ref 11.6–15.6)
GLUCOSE SERPL-MCNC: 134 MG/DL (ref 74–106)
HCT VFR BLD CALC: 28.3 % (ref 32.4–45.2)
HGB BLD-MCNC: 9 GM/DL (ref 10.7–15.3)
MAGNESIUM SERPL-MCNC: 1.8 MG/DL (ref 1.8–2.4)
MCH RBC QN AUTO: 30.9 PG (ref 25.7–33.7)
MCHC RBC AUTO-ENTMCNC: 31.9 G/DL (ref 32–36)
MCV RBC: 96.9 FL (ref 80–96)
PLATELET # BLD AUTO: 27 10^3/UL (ref 134–434)
PLATELET BLD QL SMEAR: (no result)
PMV BLD: 10.3 FL (ref 7.5–11.1)
PROT SERPL-MCNC: 5.6 G/DL (ref 6.4–8.2)
RBC # BLD AUTO: 2.92 M/MM3 (ref 3.6–5.2)
SODIUM SERPL-SCNC: 138 MMOL/L (ref 136–145)
WBC # BLD AUTO: 7.7 K/MM3 (ref 4–10.8)

## 2021-06-29 RX ADMIN — FLUTICASONE PROPIONATE SCH SPRAY: 50 SPRAY, METERED NASAL at 09:21

## 2021-06-29 RX ADMIN — POTASSIUM CHLORIDE SCH MEQ: 1500 TABLET, EXTENDED RELEASE ORAL at 09:21

## 2021-06-29 RX ADMIN — PANCRELIPASE SCH CAP: 36000; 180000; 114000 CAPSULE, DELAYED RELEASE PELLETS ORAL at 08:06

## 2021-06-29 RX ADMIN — ALLOPURINOL SCH MG: 300 TABLET ORAL at 09:21

## 2021-06-29 RX ADMIN — FUROSEMIDE SCH: 40 TABLET ORAL at 09:21

## 2021-06-29 RX ADMIN — CHOLESTYRAMINE SCH GM: 4 POWDER, FOR SUSPENSION ORAL at 17:12

## 2021-06-29 RX ADMIN — PANCRELIPASE SCH CAP: 36000; 180000; 114000 CAPSULE, DELAYED RELEASE PELLETS ORAL at 17:12

## 2021-06-29 RX ADMIN — CHOLESTYRAMINE SCH GM: 4 POWDER, FOR SUSPENSION ORAL at 08:06

## 2021-06-29 RX ADMIN — MONTELUKAST SODIUM SCH MG: 10 TABLET, COATED ORAL at 21:45

## 2021-06-29 RX ADMIN — Medication SCH: at 21:44

## 2021-06-29 RX ADMIN — CEFAZOLIN SODIUM SCH MLS/HR: 1 INJECTION, SOLUTION INTRAVENOUS at 09:20

## 2021-06-29 RX ADMIN — PANCRELIPASE SCH CAP: 36000; 180000; 114000 CAPSULE, DELAYED RELEASE PELLETS ORAL at 12:08

## 2021-06-29 RX ADMIN — VITAMIN D, TAB 1000IU (100/BT) SCH UNIT: 25 TAB at 09:21

## 2021-06-29 RX ADMIN — Medication SCH: at 21:43

## 2021-06-29 RX ADMIN — CYANOCOBALAMIN TAB 1000 MCG SCH MCG: 1000 TAB at 09:21

## 2021-06-29 RX ADMIN — CEFAZOLIN SODIUM SCH MLS/HR: 1 INJECTION, SOLUTION INTRAVENOUS at 17:12

## 2021-06-29 RX ADMIN — CEFAZOLIN SODIUM SCH MLS/HR: 1 INJECTION, SOLUTION INTRAVENOUS at 01:29

## 2021-06-29 RX ADMIN — FLUTICASONE PROPIONATE SCH SPRAY: 50 SPRAY, METERED NASAL at 21:47

## 2021-06-29 RX ADMIN — ENOXAPARIN SODIUM SCH: 40 INJECTION SUBCUTANEOUS at 09:22

## 2021-06-29 RX ADMIN — Medication SCH MG: at 17:11

## 2021-06-29 RX ADMIN — CALCIUM SCH MG: 500 TABLET ORAL at 09:21

## 2021-06-30 RX ADMIN — CEFAZOLIN SODIUM SCH MLS/HR: 1 INJECTION, SOLUTION INTRAVENOUS at 01:30

## 2021-06-30 RX ADMIN — CEFAZOLIN SODIUM SCH MLS/HR: 1 INJECTION, SOLUTION INTRAVENOUS at 17:05

## 2021-06-30 RX ADMIN — CHOLESTYRAMINE SCH GM: 4 POWDER, FOR SUSPENSION ORAL at 17:05

## 2021-06-30 RX ADMIN — MONTELUKAST SODIUM SCH MG: 10 TABLET, COATED ORAL at 21:21

## 2021-06-30 RX ADMIN — FLUTICASONE PROPIONATE SCH SPRAY: 50 SPRAY, METERED NASAL at 21:21

## 2021-06-30 RX ADMIN — Medication SCH MG: at 08:04

## 2021-06-30 RX ADMIN — CYANOCOBALAMIN TAB 1000 MCG SCH MCG: 1000 TAB at 09:43

## 2021-06-30 RX ADMIN — Medication SCH MG: at 17:05

## 2021-06-30 RX ADMIN — VITAMIN D, TAB 1000IU (100/BT) SCH UNIT: 25 TAB at 09:42

## 2021-06-30 RX ADMIN — FUROSEMIDE SCH MG: 40 TABLET ORAL at 09:43

## 2021-06-30 RX ADMIN — PANCRELIPASE SCH CAP: 36000; 180000; 114000 CAPSULE, DELAYED RELEASE PELLETS ORAL at 13:31

## 2021-06-30 RX ADMIN — POTASSIUM CHLORIDE SCH MEQ: 1500 TABLET, EXTENDED RELEASE ORAL at 09:43

## 2021-06-30 RX ADMIN — CEFAZOLIN SODIUM SCH MLS/HR: 1 INJECTION, SOLUTION INTRAVENOUS at 09:43

## 2021-06-30 RX ADMIN — CHOLESTYRAMINE SCH GM: 4 POWDER, FOR SUSPENSION ORAL at 08:02

## 2021-06-30 RX ADMIN — ENOXAPARIN SODIUM SCH: 40 INJECTION SUBCUTANEOUS at 09:44

## 2021-06-30 RX ADMIN — PANCRELIPASE SCH CAP: 36000; 180000; 114000 CAPSULE, DELAYED RELEASE PELLETS ORAL at 08:02

## 2021-06-30 RX ADMIN — CALCIUM SCH MG: 500 TABLET ORAL at 09:43

## 2021-06-30 RX ADMIN — FLUTICASONE PROPIONATE SCH SPRAY: 50 SPRAY, METERED NASAL at 09:44

## 2021-06-30 RX ADMIN — ALLOPURINOL SCH MG: 300 TABLET ORAL at 09:42

## 2021-06-30 RX ADMIN — PANCRELIPASE SCH CAP: 36000; 180000; 114000 CAPSULE, DELAYED RELEASE PELLETS ORAL at 17:50

## 2021-07-01 VITALS — DIASTOLIC BLOOD PRESSURE: 42 MMHG | SYSTOLIC BLOOD PRESSURE: 112 MMHG | HEART RATE: 80 BPM | TEMPERATURE: 98.9 F

## 2021-07-01 RX ADMIN — Medication SCH MG: at 07:52

## 2021-07-01 RX ADMIN — CEFAZOLIN SODIUM SCH MLS/HR: 1 INJECTION, SOLUTION INTRAVENOUS at 02:04

## 2021-07-01 RX ADMIN — POTASSIUM CHLORIDE SCH MEQ: 1500 TABLET, EXTENDED RELEASE ORAL at 09:53

## 2021-07-01 RX ADMIN — FUROSEMIDE SCH MG: 40 TABLET ORAL at 09:53

## 2021-07-01 RX ADMIN — VITAMIN D, TAB 1000IU (100/BT) SCH UNIT: 25 TAB at 09:53

## 2021-07-01 RX ADMIN — PANCRELIPASE SCH CAP: 36000; 180000; 114000 CAPSULE, DELAYED RELEASE PELLETS ORAL at 07:51

## 2021-07-01 RX ADMIN — FLUTICASONE PROPIONATE SCH: 50 SPRAY, METERED NASAL at 10:19

## 2021-07-01 RX ADMIN — CALCIUM SCH MG: 500 TABLET ORAL at 09:53

## 2021-07-01 RX ADMIN — CHOLESTYRAMINE SCH GM: 4 POWDER, FOR SUSPENSION ORAL at 07:51

## 2021-07-01 RX ADMIN — ALLOPURINOL SCH MG: 300 TABLET ORAL at 09:53

## 2021-07-01 RX ADMIN — CYANOCOBALAMIN TAB 1000 MCG SCH MCG: 1000 TAB at 09:53

## 2021-07-01 RX ADMIN — ENOXAPARIN SODIUM SCH: 40 INJECTION SUBCUTANEOUS at 10:19

## 2021-07-05 ENCOUNTER — HOSPITAL ENCOUNTER (EMERGENCY)
Dept: HOSPITAL 74 - FER | Age: 66
Discharge: HOME | End: 2021-07-05
Payer: COMMERCIAL

## 2021-07-05 VITALS — DIASTOLIC BLOOD PRESSURE: 54 MMHG | SYSTOLIC BLOOD PRESSURE: 122 MMHG

## 2021-07-05 VITALS — BODY MASS INDEX: 24.6 KG/M2

## 2021-07-05 VITALS — HEART RATE: 94 BPM | TEMPERATURE: 99.8 F
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
## 2021-07-05 DIAGNOSIS — S46.911A: ICD-10-CM

## 2021-07-05 DIAGNOSIS — S46.912A: ICD-10-CM

## 2021-07-05 DIAGNOSIS — M54.2: Primary | ICD-10-CM
